# Patient Record
Sex: MALE | Race: WHITE | NOT HISPANIC OR LATINO | Employment: OTHER | URBAN - METROPOLITAN AREA
[De-identification: names, ages, dates, MRNs, and addresses within clinical notes are randomized per-mention and may not be internally consistent; named-entity substitution may affect disease eponyms.]

---

## 2017-01-28 ENCOUNTER — TELEPHONE (OUTPATIENT)
Dept: FAMILY MEDICINE | Facility: CLINIC | Age: 57
End: 2017-01-28

## 2017-01-28 DIAGNOSIS — F41.1 GAD (GENERALIZED ANXIETY DISORDER): Primary | ICD-10-CM

## 2017-01-30 NOTE — TELEPHONE ENCOUNTER
Prozac       Last Written Prescription Date: 08/02/2016  Last Fill Quantity: 90, # refills: 1  Last Office Visit with [facility] primary care provider:  01/06/2016        Last PHQ-9 score on record=   PHQ-9 SCORE 4/12/2013   Total Score 9

## 2017-02-01 NOTE — TELEPHONE ENCOUNTER
Medication is being filled for 1 time refill only due to:  Patient needs to be seen because it has been more than one year since last visit.   Eve Maxwell RN    Please call to schedule annual exam.

## 2017-02-01 NOTE — TELEPHONE ENCOUNTER
Left message to call TC line at [phone] to schedule Annual exam. Please help the patient schedule for Annual exam. Last seen 1/6/2016. Billie Lewis,

## 2017-02-03 ENCOUNTER — OFFICE VISIT (OUTPATIENT)
Dept: FAMILY MEDICINE | Facility: CLINIC | Age: 57
End: 2017-02-03
Payer: COMMERCIAL

## 2017-02-03 VITALS
WEIGHT: 248 LBS | SYSTOLIC BLOOD PRESSURE: 132 MMHG | TEMPERATURE: 98.9 F | DIASTOLIC BLOOD PRESSURE: 81 MMHG | HEART RATE: 100 BPM | HEIGHT: 75 IN | BODY MASS INDEX: 30.84 KG/M2 | OXYGEN SATURATION: 97 %

## 2017-02-03 DIAGNOSIS — R68.82 DECREASED LIBIDO: ICD-10-CM

## 2017-02-03 DIAGNOSIS — Z12.11 SCREEN FOR COLON CANCER: ICD-10-CM

## 2017-02-03 DIAGNOSIS — Z11.59 NEED FOR HEPATITIS C SCREENING TEST: ICD-10-CM

## 2017-02-03 DIAGNOSIS — I10 HYPERTENSION GOAL BP (BLOOD PRESSURE) < 140/90: Primary | ICD-10-CM

## 2017-02-03 DIAGNOSIS — E78.5 HYPERLIPIDEMIA LDL GOAL <130: ICD-10-CM

## 2017-02-03 DIAGNOSIS — Z23 NEED FOR PROPHYLACTIC VACCINATION AND INOCULATION AGAINST INFLUENZA: ICD-10-CM

## 2017-02-03 DIAGNOSIS — F41.1 GAD (GENERALIZED ANXIETY DISORDER): ICD-10-CM

## 2017-02-03 DIAGNOSIS — K21.9 GASTROESOPHAGEAL REFLUX DISEASE WITHOUT ESOPHAGITIS: ICD-10-CM

## 2017-02-03 LAB
ALBUMIN SERPL-MCNC: 4 G/DL (ref 3.4–5)
ALP SERPL-CCNC: 94 U/L (ref 40–150)
ALT SERPL W P-5'-P-CCNC: 50 U/L (ref 0–70)
ANION GAP SERPL CALCULATED.3IONS-SCNC: 10 MMOL/L (ref 3–14)
AST SERPL W P-5'-P-CCNC: 31 U/L (ref 0–45)
BILIRUB SERPL-MCNC: 0.5 MG/DL (ref 0.2–1.3)
BUN SERPL-MCNC: 12 MG/DL (ref 7–30)
CALCIUM SERPL-MCNC: 9.1 MG/DL (ref 8.5–10.1)
CHLORIDE SERPL-SCNC: 102 MMOL/L (ref 94–109)
CHOLEST SERPL-MCNC: 224 MG/DL
CO2 SERPL-SCNC: 28 MMOL/L (ref 20–32)
CREAT SERPL-MCNC: 0.66 MG/DL (ref 0.66–1.25)
GFR SERPL CREATININE-BSD FRML MDRD: ABNORMAL ML/MIN/1.7M2
GLUCOSE SERPL-MCNC: 106 MG/DL (ref 70–99)
HCV AB SERPL QL IA: NORMAL
HDLC SERPL-MCNC: 66 MG/DL
LDLC SERPL CALC-MCNC: 135 MG/DL
NONHDLC SERPL-MCNC: 158 MG/DL
POTASSIUM SERPL-SCNC: 4 MMOL/L (ref 3.4–5.3)
PROT SERPL-MCNC: 7.7 G/DL (ref 6.8–8.8)
SODIUM SERPL-SCNC: 140 MMOL/L (ref 133–144)
TRIGL SERPL-MCNC: 114 MG/DL
TSH SERPL DL<=0.005 MIU/L-ACNC: 3.17 MU/L (ref 0.4–4)

## 2017-02-03 PROCEDURE — 84403 ASSAY OF TOTAL TESTOSTERONE: CPT | Performed by: FAMILY MEDICINE

## 2017-02-03 PROCEDURE — 86803 HEPATITIS C AB TEST: CPT | Performed by: FAMILY MEDICINE

## 2017-02-03 PROCEDURE — 80053 COMPREHEN METABOLIC PANEL: CPT | Performed by: FAMILY MEDICINE

## 2017-02-03 PROCEDURE — 99214 OFFICE O/P EST MOD 30 MIN: CPT | Performed by: FAMILY MEDICINE

## 2017-02-03 PROCEDURE — 84443 ASSAY THYROID STIM HORMONE: CPT | Performed by: FAMILY MEDICINE

## 2017-02-03 PROCEDURE — 80061 LIPID PANEL: CPT | Performed by: FAMILY MEDICINE

## 2017-02-03 PROCEDURE — 36415 COLL VENOUS BLD VENIPUNCTURE: CPT | Performed by: FAMILY MEDICINE

## 2017-02-03 RX ORDER — MOMETASONE FUROATE 1 MG/G
CREAM TOPICAL
Qty: 45 G | Refills: 0 | Status: CANCELLED | OUTPATIENT
Start: 2017-02-03

## 2017-02-03 RX ORDER — NAPROXEN 500 MG/1
500 TABLET ORAL 2 TIMES DAILY PRN
Qty: 30 TABLET | Refills: 1 | Status: CANCELLED | OUTPATIENT
Start: 2017-02-03

## 2017-02-03 RX ORDER — FLUOXETINE 10 MG/1
10 CAPSULE ORAL DAILY
Qty: 30 CAPSULE | Refills: 1 | Status: SHIPPED | OUTPATIENT
Start: 2017-02-03 | End: 2017-03-28

## 2017-02-03 RX ORDER — LISINOPRIL AND HYDROCHLOROTHIAZIDE 12.5; 2 MG/1; MG/1
1 TABLET ORAL DAILY
Qty: 90 TABLET | Refills: 3 | Status: SHIPPED | OUTPATIENT
Start: 2017-02-03 | End: 2018-02-04

## 2017-02-03 RX ORDER — OMEPRAZOLE/SODIUM BICARBONATE 20MG-1.1G
20 CAPSULE ORAL DAILY
Qty: 90 CAPSULE | Refills: 3 | Status: CANCELLED | OUTPATIENT
Start: 2017-02-03

## 2017-02-03 RX ORDER — CYCLOBENZAPRINE HCL 10 MG
5-10 TABLET ORAL 3 TIMES DAILY PRN
Qty: 30 TABLET | Refills: 1 | Status: CANCELLED | OUTPATIENT
Start: 2017-02-03

## 2017-02-03 RX ORDER — ATORVASTATIN CALCIUM 20 MG/1
TABLET, FILM COATED ORAL
Qty: 90 TABLET | Refills: 3 | Status: SHIPPED | OUTPATIENT
Start: 2017-02-03 | End: 2018-02-07

## 2017-02-03 NOTE — MR AVS SNAPSHOT
After Visit Summary   2/3/2017    Kody Salas    MRN: [id number]           Patient Information     Date Of Birth          1960        Visit Information        Provider Department      2/3/2017 9:00 AM Kristen Uribe MD [facility]        Today's Diagnoses     Screen for colon cancer    -  1     Need for hepatitis C screening test         Need for prophylactic vaccination and inoculation against influenza         Eczema         Gastroesophageal reflux disease, esophagitis presence not specified         Hypertension goal BP (blood pressure) < 140/90         Hyperlipidemia LDL goal <130         GERD (gastroesophageal reflux disease)         GABE (generalized anxiety disorder)         Decreased libido           Care Instructions    [facility]    If you have any questions regarding to your visit please contact your care team:       Team Red:   Clinic Hours Telephone Number   Dr. Geena Wilburn, NP   7am-7pm  Monday - Thursday   7am-5pm  Fridays  [phone]  (Appointment scheduling available 24/7)    Questions about your visit?   Team Line  [phone]   Urgent Care - [place] - 11am-9pm Monday-Friday Saturday-Sunday- 9am-5pm   [place] - 5pm-9pm Monday-Friday Saturday-Sunday- 9am-5pm  [phone] - [place]   [phone] - [place]       What options do I have for visits at the clinic other than the traditional office visit?  To expand how we care for you, many of our providers are utilizing electronic visits (e-visits) and telephone visits, when medically appropriate, for interactions with their patients rather than a visit in the clinic.   We also offer nurse visits for many medical concerns. Just like any other service, we will bill your insurance company for this type of visit based on time spent on the phone with your provider. Not all insurance companies cover these visits.  Please check with your medical insurance if this type of visit is covered. You will be responsible for any charges that are not paid by your insurance.      E-visits via In [employer]:  generally incur a $35.00 fee.  Telephone visits:  Time spent on the phone: *charged based on time that is spent on the phone in increments of 10 minutes. Estimated cost:   5-10 mins $30.00   11-20 mins. $59.00   21-30 mins. $85.00     Use [employer] (secure email communication and access to your chart) to send your primary care provider a message or make an appointment. Ask someone on your Team how to sign up for [employer].  For a Price Quote for your services, please call our [employer] Line at [phone].      As always, Thank you for trusting us with your health care needs!  Darian Elliotttaelsy          Follow-ups after your visit        Additional Services     GASTROENTEROLOGY ADULT REF PROCEDURE ONLY       Last Lab Result: CREATININE (mg/dL)       Date                     Value                 01/06/2016               0.90             ----------  Body mass index is 31.19 kg/(m^2).     Needed:  No  Language:  English    Patient will be contacted to schedule procedure.     Please be aware that coverage of these services is subject to the terms and limitations of your health insurance plan.  Call member services at your health plan with any benefit or coverage questions.  Any procedures must be performed at a [place] facility OR coordinated by your clinic's referral office.    Please bring the following with you to your appointment:    (1) Any X-Rays, CTs or MRIs which have been performed.  Contact the facility where they were done to arrange for  prior to your scheduled appointment.    (2) List of current medications   (3) This referral request   (4) Any documents/labs given to you for this referral                  Future tests that were ordered for you today     Open Future Orders        Priority Expected Expires  "Ordered    Fecal colorectal cancer screen (FIT) Routine 2/24/2017 4/28/2017 2/3/2017            Who to contact     If you have questions or need follow up information about today's clinic visit or your schedule please contact [facility] directly at [phone].  Normal or non-critical lab and imaging results will be communicated to you by MyChart, letter or phone within 4 business days after the clinic has received the results. If you do not hear from us within 7 days, please contact the clinic through [employer] or phone. If you have a critical or abnormal lab result, we will notify you by phone as soon as possible.  Submit refill requests through [employer] or call your pharmacy and they will forward the refill request to us. Please allow 3 business days for your refill to be completed.          Additional Information About Your Visit        MyChart Information     [employer] gives you secure access to your electronic health record. If you see a primary care provider, you can also send messages to your care team and make appointments. If you have questions, please call your primary care clinic.  If you do not have a primary care provider, please call [phone] and they will assist you.        Care EveryWhere ID     This is your Care EveryWhere ID. This could be used by other organizations to access your [place] medical records  [id number]        Your Vitals Were     Pulse Temperature Height BMI (Body Mass Index) Pulse Oximetry       100 98.9  F (37.2  C) (Oral) 6' 2.75\" (1.899 m) 31.19 kg/m2 97%        Blood Pressure from Last 3 Encounters:   02/03/17 132/81   01/06/16 126/88   01/08/15 132/88    Weight from Last 3 Encounters:   02/03/17 248 lb (112.492 kg)   01/06/16 234 lb (106.142 kg)   01/08/15 242 lb 6.4 oz (109.952 kg)              We Performed the Following     Comprehensive metabolic panel     GASTROENTEROLOGY ADULT REF PROCEDURE ONLY     Hepatitis C Screen Reflex to HCV RNA Quant and Genotype  "    Lipid panel reflex to direct LDL     Testosterone, total     TSH with free T4 reflex          Today's Medication Changes          These changes are accurate as of: 2/3/17  9:22 AM.  If you have any questions, ask your nurse or doctor.               These medicines have changed or have updated prescriptions.        Dose/Directions    FLUoxetine 10 MG capsule   Commonly known as:  PROzac   This may have changed:  See the new instructions.   Used for:  GABE (generalized anxiety disorder)   Changed by:  Kristen Uribe MD        Dose:  10 mg   Take 1 capsule (10 mg) by mouth daily   Quantity:  30 capsule   Refills:  1       lisinopril-hydrochlorothiazide 20-12.5 MG per tablet   Commonly known as:  PRINZIDE/ZESTORETIC   This may have changed:    - how much to take  - how to take this  - when to take this  - additional instructions   Used for:  Hypertension goal BP (blood pressure) < 140/90   Changed by:  Kristen Uribe MD        Dose:  1 tablet   Take 1 tablet by mouth daily TAKE 1 TABLET BY MOUTH DAILY **   Quantity:  90 tablet   Refills:  3            Where to get your medicines      These medications were sent to [facility]pharm[facility][id number] - [place] - [address] [facility]  [address] [place] [id number]     Phone:  [phone]    - atorvastatin 20 MG tablet  - FLUoxetine 10 MG capsule  - lisinopril-hydrochlorothiazide 20-12.5 MG per tablet             Primary Care Provider Office Phone # Fax #    Kristen Uribe [phone]       [address] [address]        Thank you!     Thank you for choosing [facility]  for your care. Our goal is always to provide you with excellent care. Hearing back from our patients is one way we can continue to improve our services. Please take a few minutes to complete the written survey that you may receive in the mail after your visit with us. Thank you!             Your Updated Medication List - Protect others around  you: Learn how to safely use, store and throw away your medicines at www.disposemymeds.org.          This list is accurate as of: 2/3/17  9:22 AM.  Always use your most recent med list.                   Brand Name Dispense Instructions for use    aspirin 81 MG tablet      Take 1 tablet by mouth daily.       atorvastatin 20 MG tablet    LIPITOR    90 tablet    TAKE 1 TABLET (20 MG) BY MOUTH DAILY       CALCIUM + D PO      Take 1 tablet by mouth daily.       FLUoxetine 10 MG capsule    PROzac    30 capsule    Take 1 capsule (10 mg) by mouth daily       lisinopril-hydrochlorothiazide 20-12.5 MG per tablet    PRINZIDE/ZESTORETIC    90 tablet    Take 1 tablet by mouth daily TAKE 1 TABLET BY MOUTH DAILY **       mometasone 0.1 % cream    ELOCON    45 g    APPLY TOPICALLY DAILY       omeprazole-sodium bicarbonate  MG Caps per capsule    ZEGERID OTC    90 capsule    Take 1 capsule by mouth daily.       ranitidine 150 MG tablet    ZANTAC    60 tablet    TAKE 1 TABLET (150 MG) BY MOUTH 2 TIMES DAILY

## 2017-02-03 NOTE — NURSING NOTE
"Chief Complaint   Patient presents with     Recheck Medication     Hypertension     Lipids       Initial /81 mmHg  Pulse 100  Temp(Src) 98.9  F (37.2  C) (Oral)  Ht 6' 2.75\" (1.899 m)  Wt 248 lb (112.492 kg)  BMI 31.19 kg/m2  SpO2 97% Estimated body mass index is 31.19 kg/(m^2) as calculated from the following:    Height as of this encounter: 6' 2.75\" (1.899 m).    Weight as of this encounter: 248 lb (112.492 kg).  BP completed using cuff size: jena Rasmussen    "

## 2017-02-03 NOTE — PATIENT INSTRUCTIONS
[facility]    If you have any questions regarding to your visit please contact your care team:       Team Red:   Clinic Hours Telephone Number   Dr. Geena Wilburn, NP   7am-7pm  Monday - Thursday   7am-5pm  Fridays  [phone]  (Appointment scheduling available 24/7)    Questions about your visit?   Team Line  [phone]   Urgent Care - [place] - 11am-9pm Monday-Friday Saturday-Sunday- 9am-5pm   [place] - 5pm-9pm Monday-Friday Saturday-Sunday- 9am-5pm  [phone] - [place]   [phone] - [place]       What options do I have for visits at the clinic other than the traditional office visit?  To expand how we care for you, many of our providers are utilizing electronic visits (e-visits) and telephone visits, when medically appropriate, for interactions with their patients rather than a visit in the clinic.   We also offer nurse visits for many medical concerns. Just like any other service, we will bill your insurance company for this type of visit based on time spent on the phone with your provider. Not all insurance companies cover these visits. Please check with your medical insurance if this type of visit is covered. You will be responsible for any charges that are not paid by your insurance.      E-visits via [employer]:  generally incur a $35.00 fee.  Telephone visits:  Time spent on the phone: *charged based on time that is spent on the phone in increments of 10 minutes. Estimated cost:   5-10 mins $30.00   11-20 mins. $59.00   21-30 mins. $85.00     Use [employer] (secure email communication and access to your chart) to send your primary care provider a message or make an appointment. Ask someone on your Team how to sign up for [employer].  For a Price Quote for your services, please call our Consumer Price Line at [phone].      As always, Thank you for trusting us with your health care needs!  Darian STANLEY  [place]

## 2017-02-03 NOTE — PROGRESS NOTES
SUBJECTIVE:                                                    Kody Salas is a 56 year old male who presents to clinic today for the following health issues:      Hyperlipidemia Follow-Up      Rate your low fat/cholesterol diet?: not monitoring fat    Taking statin?  Yes, no muscle aches from statin    Other lipid medications/supplements?:  none     Hypertension Follow-up      Outpatient blood pressures are not being checked.    Low Salt Diet: not monitoring salt       Amount of exercise or physical activity: None    Problems taking medications regularly: No    Medication side effects: none    Diet:  low salt and low fat/cholesterol        Problem list and histories reviewed & adjusted, as indicated.  Additional history: as documented    Patient Active Problem List   Diagnosis     Hyperlipidemia LDL goal <130     Seasonal allergies     Hypertension goal BP (blood pressure) < 140/90     GABE (generalized anxiety disorder)     GERD (gastroesophageal reflux disease)     Eczema     Obesity     History of ETOH abuse     Chronic back pain     Impaired glucose tolerance     Anxiety     Past Surgical History   Procedure Laterality Date     Prophylaxis retina detach,cryo/diath  12/2009     Hc knee scope,med/lat meniscus repair  2003     right       Social History   Substance Use Topics     Smoking status: Former Smoker     Quit date: 06/15/2012     Smokeless tobacco: Never Used     Alcohol Use: No      Comment: quit January 2015     Family History   Problem Relation Age of Onset     Hypertension Mother      Hypertension Father      Hypertension Brother      Hypertension Maternal Grandmother      Hypertension Maternal Grandfather      Hypertension Paternal Grandmother      Hypertension Paternal Grandfather      HEART DISEASE Paternal Grandfather      Anesthesia Reaction No family hx of          Current Outpatient Prescriptions   Medication Sig Dispense Refill     lisinopril-hydrochlorothiazide  (PRINZIDE/ZESTORETIC) 20-12.5 MG per tablet Take 1 tablet by mouth daily TAKE 1 TABLET BY MOUTH DAILY ** 90 tablet 3     atorvastatin (LIPITOR) 20 MG tablet TAKE 1 TABLET (20 MG) BY MOUTH DAILY 90 tablet 3     FLUoxetine (PROZAC) 10 MG capsule Take 1 capsule (10 mg) by mouth daily 30 capsule 1     mometasone (ELOCON) 0.1 % cream APPLY TOPICALLY DAILY 45 g 0     ranitidine (ZANTAC) 150 MG tablet TAKE 1 TABLET (150 MG) BY MOUTH 2 TIMES DAILY 60 tablet 2     aspirin 81 MG tablet Take 1 tablet by mouth daily.       Calcium Carbonate-Vitamin D (CALCIUM + D PO) Take 1 tablet by mouth daily.       Omeprazole-Sodium Bicarbonate (ZEGERID OTC)  MG CAPS Take 1 capsule by mouth daily. 90 capsule 3     [DISCONTINUED] FLUoxetine (PROZAC) 20 MG capsule TAKE 1 CAPSULE EVERY DAY 90 capsule 0     [DISCONTINUED] lisinopril-hydrochlorothiazide (PRINZIDE,ZESTORETIC) 20-12.5 MG per tablet TAKE 1 TABLET BY MOUTH DAILY **NEEDS MD FOLLOW UP** 90 tablet 3     [DISCONTINUED] atorvastatin (LIPITOR) 20 MG tablet TAKE 1 TABLET (20 MG) BY MOUTH DAILY 90 tablet 3     No Known Allergies  Recent Labs   Lab Test  01/06/16   0837  01/08/15   0756  09/25/14   1213   11/27/13   0918   LDL  126*  102  136*   --   116   HDL  37*   --   71   --   57   TRIG  130   --   119   --   79   ALT  26   --   54   --   53   CR  0.90   --   0.77   < >  0.64*   GFRESTIMATED  87   --   >90  Non  GFR Calc     < >  >90   GFRESTBLACK  >90   GFR Calc     --   >90   GFR Calc     < >  >90   POTASSIUM  4.7  4.1  4.0   < >  4.5    < > = values in this interval not displayed.      BP Readings from Last 3 Encounters:   02/03/17 132/81   01/06/16 126/88   01/08/15 132/88    Wt Readings from Last 3 Encounters:   02/03/17 248 lb (112.492 kg)   01/06/16 234 lb (106.142 kg)   01/08/15 242 lb 6.4 oz (109.952 kg)             Pt says he has Decreased Libido and wants testosterone check  He is doing very well as far as anxiety and  "wants to get off Prozac  He occasionally drinks wine bur no Hard Liquor and is doing well     Labs reviewed in EPIC  Problem list, Medication list, Allergies, and Medical/Social/Surgical histories reviewed in [facility] and updated as appropriate.    ROS:  C: NEGATIVE for fever, chills, change in weight  E/M: NEGATIVE for ear, mouth and throat problems  R: NEGATIVE for significant cough or SOB  CV: NEGATIVE for chest pain, palpitations or peripheral edema  GI: NEGATIVE for nausea, abdominal pain, heartburn, or change in bowel habits  PSYCHIATRIC: NEGATIVE for changes in mood or affect    OBJECTIVE:                                                    /81 mmHg  Pulse 100  Temp(Src) 98.9  F (37.2  C) (Oral)  Ht 6' 2.75\" (1.899 m)  Wt 248 lb (112.492 kg)  BMI 31.19 kg/m2  SpO2 97%  Body mass index is 31.19 kg/(m^2).  GENERAL: healthy, alert and no distress  NECK: no adenopathy, no asymmetry, masses, or scars and thyroid normal to palpation  RESP: lungs clear to auscultation - no rales, rhonchi or wheezes  CV: regular rate and rhythm, normal S1 S2, no S3 or S4, no murmur, click or rub, no peripheral edema and peripheral pulses strong  ABDOMEN: soft, nontender, no hepatosplenomegaly, no masses and bowel sounds normal  MS: no gross musculoskeletal defects noted, no edema  Psych normal   Diagnostic Test Results:  Pending      ASSESSMENT/PLAN:                                                        BMI:   Estimated body mass index is 31.19 kg/(m^2) as calculated from the following:    Height as of this encounter: 6' 2.75\" (1.899 m).    Weight as of this encounter: 248 lb (112.492 kg).   Weight management plan: low ross diet.exercise      1. Hypertension goal BP (blood pressure) < 140/90  controlled  - lisinopril-hydrochlorothiazide (PRINZIDE/ZESTORETIC) 20-12.5 MG per tablet; Take 1 tablet by mouth daily TAKE 1 TABLET BY MOUTH DAILY **  Dispense: 90 tablet; Refill: 3    2. Hyperlipidemia LDL goal <130  Will check  - " atorvastatin (LIPITOR) 20 MG tablet; TAKE 1 TABLET (20 MG) BY MOUTH DAILY  Dispense: 90 tablet; Refill: 3  - Lipid panel reflex to direct LDL  - Comprehensive metabolic panel    3. GERD (gastroesophageal reflux disease)  Pt takes otc PPI as needed    4. GABE (generalized anxiety disorder)  Advised decrease Prozac to 10 mg daily and gradually wean over the next 3 weeks  Call if any Problems or recurrence of symptoms  - FLUoxetine (PROZAC) 10 MG capsule; Take 1 capsule (10 mg) by mouth daily  Dispense: 30 capsule; Refill: 1    5. Decreased libido  Discussed Treatment options  See how he does when off Prozac  - Testosterone, total  - TSH with free T4 reflex    6. Screen for colon cancer  Advised colonoscopy-Pt says he will get it possibly in summer  Advised do FIT card till then  - Fecal colorectal cancer screen (FIT); Future  - GASTROENTEROLOGY ADULT REF PROCEDURE ONLY    7. Need for hepatitis C screening test  Advised   - Hepatitis C Screen Reflex to HCV RNA Quant and Genotype    8. Need for prophylactic vaccination and inoculation against influenza  Pt declined      Work on weight loss  Regular exercise    Kristen Uribe MD  [facility]

## 2017-02-04 LAB — TESTOST SERPL-MCNC: 251 NG/DL (ref 240–950)

## 2017-02-19 DIAGNOSIS — E78.5 HYPERLIPIDEMIA LDL GOAL <130: ICD-10-CM

## 2017-02-21 RX ORDER — ATORVASTATIN CALCIUM 20 MG/1
TABLET, FILM COATED ORAL
Qty: 90 TABLET | Refills: 3 | OUTPATIENT
Start: 2017-02-21

## 2017-02-21 NOTE — TELEPHONE ENCOUNTER
Duplicate.  Called and verified with pharmacy.    Medication Detail         Disp Refills Start End CATARINA     atorvastatin (LIPITOR) 20 MG tablet 90 tablet 3 2/3/2017  No     Sig: TAKE 1 TABLET (20 MG) BY MOUTH DAILY     Class: E-Prescribe     Order: [id number]     E-Prescribing Status: Receipt confirmed by pharmacy (2/3/2017  9:10 AM CST)       Printout Tracking      External Result Report       Medication Administration Instructions      TAKE 1 TABLET (20 MG) BY MOUTH DAILY     Meera Jordan MA

## 2017-03-26 ENCOUNTER — MYC MEDICAL ADVICE (OUTPATIENT)
Dept: FAMILY MEDICINE | Facility: CLINIC | Age: 57
End: 2017-03-26

## 2017-03-26 DIAGNOSIS — F41.1 GAD (GENERALIZED ANXIETY DISORDER): ICD-10-CM

## 2017-03-27 RX ORDER — FLUOXETINE 10 MG/1
10 CAPSULE ORAL DAILY
Qty: 30 CAPSULE | Refills: 1 | Status: CANCELLED | OUTPATIENT
Start: 2017-03-27

## 2017-03-27 RX ORDER — SILDENAFIL 50 MG/1
50 TABLET, FILM COATED ORAL DAILY PRN
Qty: 30 TABLET | Status: CANCELLED | OUTPATIENT
Start: 2017-03-27

## 2017-03-27 NOTE — TELEPHONE ENCOUNTER
Medications and pharmacy teed up.  Viagra is not active under patient's current meds list.  Please review and advise.    Rima ARANA RN, BSN

## 2017-03-28 RX ORDER — FLUOXETINE 10 MG/1
10 CAPSULE ORAL DAILY
Qty: 30 CAPSULE | Refills: 6 | Status: SHIPPED | OUTPATIENT
Start: 2017-03-28 | End: 2017-04-20

## 2017-03-28 ASSESSMENT — ANXIETY QUESTIONNAIRES
GAD7 TOTAL SCORE: 0
7. FEELING AFRAID AS IF SOMETHING AWFUL MIGHT HAPPEN: 0 = NOT AT ALL
GAD7 TOTAL SCORE: 0

## 2017-03-28 NOTE — TELEPHONE ENCOUNTER
Please do GABE  I have Not ? Prescribed Viagra  Please set up e visit or telephone visit to discuss

## 2017-03-28 NOTE — TELEPHONE ENCOUNTER
Sent a [employer] message with the providers message below.     MA please follow up on this to do the GABE over the phone and inform them to set up a Evisit. Thank you

## 2017-03-29 ASSESSMENT — ANXIETY QUESTIONNAIRES: GAD7 TOTAL SCORE: 0

## 2017-04-06 NOTE — TELEPHONE ENCOUNTER
Patient has been called and read his [employer] message and still has not made an appointment. Closing chart.  María Leyva,

## 2017-04-20 DIAGNOSIS — F41.1 GAD (GENERALIZED ANXIETY DISORDER): ICD-10-CM

## 2017-04-21 ENCOUNTER — TELEPHONE (OUTPATIENT)
Dept: FAMILY MEDICINE | Facility: CLINIC | Age: 57
End: 2017-04-21

## 2017-04-21 DIAGNOSIS — Z12.11 SCREEN FOR COLON CANCER: Primary | ICD-10-CM

## 2017-04-21 RX ORDER — FLUOXETINE 10 MG/1
10 CAPSULE ORAL DAILY
Qty: 30 CAPSULE | Refills: 6 | Status: SHIPPED | OUTPATIENT
Start: 2017-04-21 | End: 2017-12-22

## 2017-04-21 NOTE — TELEPHONE ENCOUNTER
Panel Management Review      Patient has the following on his problem list:     Hypertension   Last three blood pressure readings:  BP Readings from Last 3 Encounters:   02/03/17 132/81   01/06/16 126/88   01/08/15 132/88         HTN Guidelines:  Age 18-59 BP range:  Less than 140/90  Age 60-85 with Diabetes:  Less than 140/90  Age 60-85 without Diabetes:  less than 150/90      Composite cancer screening  Chart review shows that this patient is due/due soon for the following Colonoscopy and Fecal Colorectal (FIT)  Summary:    Patient is due/failing the following:   COLONOSCOPY and FIT    Action needed:   Routed to provider for review.    Type of outreach:    None, routed to provider for review.    Questions for provider review:    None                                                                                                                                    Lindy Trimble MA       Chart routed to Provider .

## 2017-05-01 DIAGNOSIS — L30.9 ECZEMA: ICD-10-CM

## 2017-05-02 NOTE — TELEPHONE ENCOUNTER
mometasone (ELOCON) 0.1 % cream      Last Written Prescription Date: 11/22/2016  Last Fill Quantity: 45g,  # refills: 0   Last Office Visit with [facility], [facility] or [facility] prescribing provider: 02/03/2017                                             Meera Jordan MA

## 2017-05-03 RX ORDER — MOMETASONE FUROATE 1 MG/G
CREAM TOPICAL
Qty: 45 G | Refills: 1 | Status: SHIPPED | OUTPATIENT
Start: 2017-05-03 | End: 2018-03-29

## 2017-12-12 ENCOUNTER — MYC MEDICAL ADVICE (OUTPATIENT)
Dept: FAMILY MEDICINE | Facility: CLINIC | Age: 57
End: 2017-12-12

## 2017-12-12 DIAGNOSIS — L98.9 SKIN LESION: Primary | ICD-10-CM

## 2017-12-12 NOTE — TELEPHONE ENCOUNTER
"Patient is asking for dermatologist referral regarding some \"skin cancer concerns\".   Please advise for referral or RN triage    Kena Huber RN       "

## 2017-12-15 NOTE — TELEPHONE ENCOUNTER
Unable to leave a voicemail as it has not been set up yet. Billie Lewis,       Left a message on work phone to check his [employer] message for response to his request.

## 2017-12-21 ENCOUNTER — TELEPHONE (OUTPATIENT)
Dept: FAMILY MEDICINE | Facility: CLINIC | Age: 57
End: 2017-12-21

## 2017-12-21 DIAGNOSIS — Z12.11 SPECIAL SCREENING FOR MALIGNANT NEOPLASMS, COLON: Primary | ICD-10-CM

## 2017-12-21 DIAGNOSIS — F41.1 GAD (GENERALIZED ANXIETY DISORDER): ICD-10-CM

## 2017-12-22 RX ORDER — FLUOXETINE 10 MG/1
10 CAPSULE ORAL DAILY
Qty: 90 CAPSULE | Refills: 0 | Status: SHIPPED | OUTPATIENT
Start: 2017-12-22 | End: 2018-02-07

## 2017-12-22 RX ORDER — FLUOXETINE 10 MG/1
CAPSULE ORAL
Qty: 30 CAPSULE | Refills: 1 | Status: SHIPPED | OUTPATIENT
Start: 2017-12-22 | End: 2018-02-07

## 2017-12-22 NOTE — TELEPHONE ENCOUNTER
Prescription approved per [facility] Refill Protocol.  Flakita Pinto, RN - BC

## 2017-12-26 NOTE — TELEPHONE ENCOUNTER
Called patient, voicemail not set up yet.  Unable to leave voicemail, will try again at another time.  Lindy Trimble MA

## 2018-01-04 NOTE — TELEPHONE ENCOUNTER
Attempted to reach patient no answer, voicemail has not been set up unable to leave message. Catherine Sanchez MA

## 2018-01-12 NOTE — TELEPHONE ENCOUNTER
We have not received a call back from patient at this time. Closing this encounter.     Holly Fam MA

## 2018-02-07 ENCOUNTER — OFFICE VISIT (OUTPATIENT)
Dept: FAMILY MEDICINE | Facility: CLINIC | Age: 58
End: 2018-02-07
Payer: COMMERCIAL

## 2018-02-07 VITALS
WEIGHT: 241 LBS | DIASTOLIC BLOOD PRESSURE: 72 MMHG | BODY MASS INDEX: 29.97 KG/M2 | HEIGHT: 75 IN | TEMPERATURE: 99.2 F | RESPIRATION RATE: 20 BRPM | HEART RATE: 92 BPM | SYSTOLIC BLOOD PRESSURE: 106 MMHG | OXYGEN SATURATION: 96 %

## 2018-02-07 DIAGNOSIS — Z23 NEED FOR PROPHYLACTIC VACCINATION AND INOCULATION AGAINST INFLUENZA: ICD-10-CM

## 2018-02-07 DIAGNOSIS — F41.1 GAD (GENERALIZED ANXIETY DISORDER): ICD-10-CM

## 2018-02-07 DIAGNOSIS — R35.0 BENIGN PROSTATIC HYPERPLASIA WITH URINARY FREQUENCY: ICD-10-CM

## 2018-02-07 DIAGNOSIS — K21.9 GASTROESOPHAGEAL REFLUX DISEASE WITHOUT ESOPHAGITIS: ICD-10-CM

## 2018-02-07 DIAGNOSIS — E78.5 HYPERLIPIDEMIA LDL GOAL <130: Primary | ICD-10-CM

## 2018-02-07 DIAGNOSIS — Z87.891 HISTORY OF TOBACCO USE: ICD-10-CM

## 2018-02-07 DIAGNOSIS — R73.02 IMPAIRED GLUCOSE TOLERANCE: ICD-10-CM

## 2018-02-07 DIAGNOSIS — I10 HYPERTENSION GOAL BP (BLOOD PRESSURE) < 140/90: ICD-10-CM

## 2018-02-07 DIAGNOSIS — Z12.5 SCREENING FOR PROSTATE CANCER: ICD-10-CM

## 2018-02-07 DIAGNOSIS — Z12.11 SCREEN FOR COLON CANCER: ICD-10-CM

## 2018-02-07 DIAGNOSIS — F10.11 HISTORY OF ETOH ABUSE: ICD-10-CM

## 2018-02-07 DIAGNOSIS — Z00.00 ROUTINE HISTORY AND PHYSICAL EXAMINATION OF ADULT: ICD-10-CM

## 2018-02-07 DIAGNOSIS — N40.1 BENIGN PROSTATIC HYPERPLASIA WITH URINARY FREQUENCY: ICD-10-CM

## 2018-02-07 LAB
ALBUMIN SERPL-MCNC: 4 G/DL (ref 3.4–5)
ALP SERPL-CCNC: 74 U/L (ref 40–150)
ALT SERPL W P-5'-P-CCNC: 38 U/L (ref 0–70)
ANION GAP SERPL CALCULATED.3IONS-SCNC: 7 MMOL/L (ref 3–14)
AST SERPL W P-5'-P-CCNC: 18 U/L (ref 0–45)
BILIRUB DIRECT SERPL-MCNC: 0.1 MG/DL (ref 0–0.2)
BILIRUB SERPL-MCNC: 0.6 MG/DL (ref 0.2–1.3)
BUN SERPL-MCNC: 15 MG/DL (ref 7–30)
CALCIUM SERPL-MCNC: 9.3 MG/DL (ref 8.5–10.1)
CHLORIDE SERPL-SCNC: 106 MMOL/L (ref 94–109)
CHOLEST SERPL-MCNC: 186 MG/DL
CO2 SERPL-SCNC: 28 MMOL/L (ref 20–32)
CREAT SERPL-MCNC: 0.74 MG/DL (ref 0.66–1.25)
GFR SERPL CREATININE-BSD FRML MDRD: >90 ML/MIN/1.7M2
GLUCOSE SERPL-MCNC: 102 MG/DL (ref 70–99)
HBA1C MFR BLD: 5.5 % (ref 4.3–6)
HDLC SERPL-MCNC: 41 MG/DL
LDLC SERPL CALC-MCNC: 126 MG/DL
NONHDLC SERPL-MCNC: 145 MG/DL
POTASSIUM SERPL-SCNC: 3.7 MMOL/L (ref 3.4–5.3)
PROT SERPL-MCNC: 7.5 G/DL (ref 6.8–8.8)
PSA SERPL-ACNC: 0.61 UG/L (ref 0–4)
SODIUM SERPL-SCNC: 141 MMOL/L (ref 133–144)
TRIGL SERPL-MCNC: 95 MG/DL

## 2018-02-07 PROCEDURE — 36415 COLL VENOUS BLD VENIPUNCTURE: CPT | Performed by: FAMILY MEDICINE

## 2018-02-07 PROCEDURE — 99213 OFFICE O/P EST LOW 20 MIN: CPT | Mod: 25 | Performed by: FAMILY MEDICINE

## 2018-02-07 PROCEDURE — 99396 PREV VISIT EST AGE 40-64: CPT | Performed by: FAMILY MEDICINE

## 2018-02-07 PROCEDURE — 80061 LIPID PANEL: CPT | Performed by: FAMILY MEDICINE

## 2018-02-07 PROCEDURE — G0103 PSA SCREENING: HCPCS | Performed by: FAMILY MEDICINE

## 2018-02-07 PROCEDURE — G0296 VISIT TO DETERM LDCT ELIG: HCPCS | Performed by: FAMILY MEDICINE

## 2018-02-07 PROCEDURE — 83036 HEMOGLOBIN GLYCOSYLATED A1C: CPT | Performed by: FAMILY MEDICINE

## 2018-02-07 PROCEDURE — 80076 HEPATIC FUNCTION PANEL: CPT | Performed by: FAMILY MEDICINE

## 2018-02-07 PROCEDURE — 80048 BASIC METABOLIC PNL TOTAL CA: CPT | Performed by: FAMILY MEDICINE

## 2018-02-07 RX ORDER — LISINOPRIL AND HYDROCHLOROTHIAZIDE 12.5; 2 MG/1; MG/1
1 TABLET ORAL DAILY
Qty: 90 TABLET | Refills: 3 | Status: SHIPPED | OUTPATIENT
Start: 2018-02-07 | End: 2019-04-03

## 2018-02-07 RX ORDER — TAMSULOSIN HYDROCHLORIDE 0.4 MG/1
0.4 CAPSULE ORAL DAILY
Qty: 30 CAPSULE | Refills: 1 | Status: SHIPPED | OUTPATIENT
Start: 2018-02-07 | End: 2018-04-08

## 2018-02-07 RX ORDER — ATORVASTATIN CALCIUM 20 MG/1
TABLET, FILM COATED ORAL
Qty: 90 TABLET | Refills: 3 | Status: SHIPPED | OUTPATIENT
Start: 2018-02-07 | End: 2019-02-14

## 2018-02-07 RX ORDER — FLUOXETINE 10 MG/1
CAPSULE ORAL
Qty: 30 CAPSULE | Refills: 6 | Status: SHIPPED | OUTPATIENT
Start: 2018-02-07 | End: 2018-10-07

## 2018-02-07 ASSESSMENT — ANXIETY QUESTIONNAIRES
6. BECOMING EASILY ANNOYED OR IRRITABLE: SEVERAL DAYS
GAD7 TOTAL SCORE: 5
7. FEELING AFRAID AS IF SOMETHING AWFUL MIGHT HAPPEN: SEVERAL DAYS
5. BEING SO RESTLESS THAT IT IS HARD TO SIT STILL: NOT AT ALL
1. FEELING NERVOUS, ANXIOUS, OR ON EDGE: NOT AT ALL
3. WORRYING TOO MUCH ABOUT DIFFERENT THINGS: SEVERAL DAYS
IF YOU CHECKED OFF ANY PROBLEMS ON THIS QUESTIONNAIRE, HOW DIFFICULT HAVE THESE PROBLEMS MADE IT FOR YOU TO DO YOUR WORK, TAKE CARE OF THINGS AT HOME, OR GET ALONG WITH OTHER PEOPLE: SOMEWHAT DIFFICULT
2. NOT BEING ABLE TO STOP OR CONTROL WORRYING: SEVERAL DAYS

## 2018-02-07 ASSESSMENT — PATIENT HEALTH QUESTIONNAIRE - PHQ9: 5. POOR APPETITE OR OVEREATING: SEVERAL DAYS

## 2018-02-07 ASSESSMENT — PAIN SCALES - GENERAL: PAINLEVEL: NO PAIN (0)

## 2018-02-07 NOTE — PROGRESS NOTES
Lung Cancer Screening Shared Decision Making Visit     Kody Salas is eligible for lung cancer screening on the basis of the information provided in my signed lung cancer screening order.     I have discussed with patient the risks and benefits of screening for lung cancer with low-dose CT.     The risks include:   radiation exposure    false positives     over-diagnosis    The benefit of early detection of lung cancer is contingent upon adherence to annual screening or more frequent follow up if indicated.     Furthermore, reaping the benefits of screening requires Kody Salas to be willing and physically able to undergo diagnostic procedures, if indicated. Although no specific guide is available for determining severity of comorbidities, it is reasonable to withhold screening in patients who have greater mortality risk from other diseases.     We did discuss that the only way to prevent lung cancer is to not smoke.   ShouldIScreen      Answers for HPI/ROS submitted by the patient on 2/7/2018   Annual Exam:  Getting at least 3 servings of Calcium per day:: NO  Bi-annual eye exam:: Yes  Dental care twice a year:: NO  Sleep apnea or symptoms of sleep apnea:: Daytime drowsiness  Diet:: Regular (no restrictions)  Frequency of exercise:: None  Taking medications regularly:: Yes  Medication side effects:: None  Additional concerns today:: YES  PHQ-2 Score: 1

## 2018-02-07 NOTE — PROGRESS NOTES
SUBJECTIVE:   CC: Kody Salas is an 57 year old male who presents for preventative health visit.     Physical   Annual:     Getting at least 3 servings of Calcium per day::  NO    Bi-annual eye exam::  Yes    Dental care twice a year::  NO    Sleep apnea or symptoms of sleep apnea::  Daytime drowsiness    Diet::  Regular (no restrictions)    Frequency of exercise::  None    Taking medications regularly::  Yes    Medication side effects::  None    Additional concerns today::  YES                Hyperlipidemia Follow-Up      Rate your low fat/cholesterol diet?: good    Taking statin?  Yes, no muscle aches from statin    Other lipid medications/supplements?:  none    Hypertension Follow-up      Outpatient blood pressures are not being checked.    Low Salt Diet: no added salt      Today's PHQ-2 Score:   PHQ-2 ( 1999 [employer]) 2/7/2018   Q1: Little interest or pleasure in doing things 1   Q2: Feeling down, depressed or hopeless 0   PHQ-2 Score 1   Q1: Little interest or pleasure in doing things Several days   Q2: Feeling down, depressed or hopeless Not at all   PHQ-2 Score 1       Abuse: Current or Past(Physical, Sexual or Emotional)- No  Do you feel safe in your environment - Yes    Social History   Substance Use Topics     Smoking status: Former Smoker     Quit date: 6/15/2012     Smokeless tobacco: Never Used     Alcohol use No      Comment: quit January 2015     Alcohol Use 2/7/2018   If you drink alcohol, do you typically have greater than 3 drinks per day OR greater than 7 drinks per week?   Not applicable       Last PSA:   PSA   Date Value Ref Range Status   09/25/2014 0.98 0 - 4 ug/L Final       Reviewed orders with patient. Reviewed health maintenance and updated orders accordingly - Yes  Labs reviewed in EPIC  BP Readings from Last 3 Encounters:   02/07/18 106/72   02/03/17 132/81   01/06/16 126/88    Wt Readings from Last 3 Encounters:   02/07/18 241 lb (109.3 kg)   02/03/17 248 lb (112.5 kg)    01/06/16 234 lb (106.1 kg)                  Patient Active Problem List   Diagnosis     Hyperlipidemia LDL goal <130     Seasonal allergies     Hypertension goal BP (blood pressure) < 140/90     GABE (generalized anxiety disorder)     GERD (gastroesophageal reflux disease)     Eczema     Obesity     History of ETOH abuse     Chronic back pain     Impaired glucose tolerance     Anxiety     Past Surgical History:   Procedure Laterality Date     HC KNEE SCOPE,MED/LAT MENISCUS REPAIR  2003    right     PROPHYLAXIS RETINA DETACH,CRYO/DIATH  12/2009       Social History   Substance Use Topics     Smoking status: Former Smoker     Packs/day: 1.00     Years: 35.00     Quit date: 6/15/2012     Smokeless tobacco: Never Used     Alcohol use No      Comment: quit January 2015     Family History   Problem Relation Age of Onset     Hypertension Mother      Hypertension Father      Hypertension Brother      Hypertension Maternal Grandmother      Hypertension Maternal Grandfather      Hypertension Paternal Grandmother      Hypertension Paternal Grandfather      HEART DISEASE Paternal Grandfather      Anesthesia Reaction No family hx of          Current Outpatient Prescriptions   Medication Sig Dispense Refill     tamsulosin (FLOMAX) 0.4 MG capsule Take 1 capsule (0.4 mg) by mouth daily 30 capsule 1     lisinopril-hydrochlorothiazide (PRINZIDE/ZESTORETIC) 20-12.5 MG per tablet Take 1 tablet by mouth daily due for office visit for further refills. 90 tablet 3     atorvastatin (LIPITOR) 20 MG tablet TAKE 1 TABLET (20 MG) BY MOUTH DAILY 90 tablet 3     FLUoxetine (PROZAC) 10 MG capsule TAKE 1 CAPSULE (10 MG) BY MOUTH DAILY 30 capsule 6     order for DME Light for SAD. 10,000 lux, to use daily for 15 minutes per day starting in October, increase to 30 minutes per day starting in November, December and January, decrease to 15 minutes daily in February through March. 1 each 0     mometasone (ELOCON) 0.1 % cream APPLY TO AFFECTED AREA  DAILY AS DIRECTED 45 g 1     [DISCONTINUED] lisinopril-hydrochlorothiazide (PRINZIDE/ZESTORETIC) 20-12.5 MG per tablet Take 1 tablet by mouth daily due for office visit for further refills. 30 tablet 0     [DISCONTINUED] FLUoxetine (PROZAC) 10 MG capsule TAKE 1 CAPSULE (10 MG) BY MOUTH DAILY 30 capsule 1     [DISCONTINUED] FLUoxetine (PROZAC) 10 MG capsule Take 1 capsule (10 mg) by mouth daily 90 capsule 0     [DISCONTINUED] atorvastatin (LIPITOR) 20 MG tablet TAKE 1 TABLET (20 MG) BY MOUTH DAILY 90 tablet 3     No Known Allergies  Recent Labs   Lab Test  02/07/18   0947  02/03/17   0928  01/06/16   0837  01/08/15   0756  09/25/14   1213   A1C  5.5   --    --    --    --    LDL   --   135*  126*  102  136*   HDL   --   66  37*   --   71   TRIG   --   114  130   --   119   ALT   --   50  26   --   54   CR   --   0.66  0.90   --   0.77   GFRESTIMATED   --   >90  Non  GFR Calc    87   --   >90  Non  GFR Calc     GFRESTBLACK   --   >90   GFR Calc    >90   GFR Calc     --   >90   GFR Calc     POTASSIUM   --   4.0  4.7  4.1  4.0   TSH   --   3.17   --    --    --         Reviewed and updated as needed this visit by clinical staff  Tobacco  Allergies  Meds  Med Hx  Surg Hx  Fam Hx  Soc Hx        Reviewed and updated as needed this visit by Provider        Past Medical History:   Diagnosis Date     Arthritis      Hypertension       Past Surgical History:   Procedure Laterality Date      KNEE SCOPE,MED/LAT MENISCUS REPAIR  2003    right     PROPHYLAXIS RETINA DETACH,CRYO/DIATH  12/2009       Review of Systems  C: NEGATIVE for fever, chills, change in weight  I: NEGATIVE for worrisome rashes, moles or lesions  E: NEGATIVE for vision changes or irritation  ENT: NEGATIVE for ear, mouth and throat problems  R: NEGATIVE for significant cough or SOB  CV: NEGATIVE for chest pain, palpitations or peripheral edema  GI: NEGATIVE for nausea,  "abdominal pain, heartburn, or change in bowel habits   male:  Pt has Nocturia and hesitancy  M: NEGATIVE for significant arthralgias or myalgia  N: NEGATIVE for weakness, dizziness or paresthesias  E: NEGATIVE for temperature intolerance, skin/hair changes  P: NEGATIVE for changes in mood or affect    OBJECTIVE:   /72 (BP Location: Left arm, Patient Position: Chair, Cuff Size: Adult Large)  Pulse 92  Temp 99.2  F (37.3  C) (Oral)  Resp 20  Ht 6' 2.75\" (1.899 m)  Wt 241 lb (109.3 kg)  SpO2 96%  BMI 30.32 kg/m2    Physical Exam  GENERAL: healthy, alert and no distress  EYES: Eyes grossly normal to inspection, PERRL and conjunctivae and sclerae normal  HENT: ear canals and TM's normal, nose and mouth without ulcers or lesions  NECK: no adenopathy, no asymmetry, masses, or scars and thyroid normal to palpation  RESP: lungs clear to auscultation - no rales, rhonchi or wheezes  CV: regular rate and rhythm, normal S1 S2, no S3 or S4, no murmur, click or rub, no peripheral edema and peripheral pulses strong  ABDOMEN: soft, nontender, no hepatosplenomegaly, no masses and bowel sounds normal   (male): normal male genitalia without lesions or urethral discharge, no hernia  RECTAL: prostate + enlarged, nontender  ,  No nodules  MS: no gross musculoskeletal defects noted, no edema  SKIN: no suspicious lesions or rashes  NEURO: Normal strength and tone, mentation intact and speech normal  PSYCH: mentation appears normal, affect normal/bright    ASSESSMENT/PLAN:   1. Routine history and physical examination of adult      2. Hyperlipidemia LDL goal <130  Labs pending   - Lipid panel reflex to direct LDL Fasting  - atorvastatin (LIPITOR) 20 MG tablet; TAKE 1 TABLET (20 MG) BY MOUTH DAILY  Dispense: 90 tablet; Refill: 3    3. Hypertension goal BP (blood pressure) < 140/90  controlled  - BASIC METABOLIC PANEL  - lisinopril-hydrochlorothiazide (PRINZIDE/ZESTORETIC) 20-12.5 MG per tablet; Take 1 tablet by mouth daily " due for office visit for further refills.  Dispense: 90 tablet; Refill: 3    4. GABE (generalized anxiety disorder)  Stable   Refilled   - FLUoxetine (PROZAC) 10 MG capsule; TAKE 1 CAPSULE (10 MG) BY MOUTH DAILY  Dispense: 30 capsule; Refill: 6  - order for DME; Light for SAD. 10,000 lux, to use daily for 15 minutes per day starting in October, increase to 30 minutes per day starting in November, December and January, decrease to 15 minutes daily in February through March.  Dispense: 1 each; Refill: 0    5. History of ETOH abuse  Pt quit in December 2017  - Hepatic panel    6. Impaired glucose tolerance  Advised watch diet  - Hemoglobin A1c    7. Need for prophylactic vaccination and inoculation against influenza  Advised -Pt declined    8. Screening for prostate cancer    - PSA, screen    9. Screen for colon cancer  Advised importance of screen  - GASTROENTEROLOGY ADULT REF PROCEDURE ONLY    10. Gastroesophageal reflux disease without esophagitis  Stable     11. Benign prostatic hyperplasia with urinary frequency  Advised try Flomax  SEE [facility] care orders  The potential side effects of this medication have been discussed with the patient.  Call if any significant problems with these are experienced.  If not better see Urology  - tamsulosin (FLOMAX) 0.4 MG capsule; Take 1 capsule (0.4 mg) by mouth daily  Dispense: 30 capsule; Refill: 1  - UROLOGY ADULT REFERRAL    12. History of tobacco use  Advised   Advised check with his insurance regarding coverage  - Prof fee: Shared Decisionmaking for Lung Cancer Screening  - CT Chest Lung Cancer Scrn Low Dose wo; Future  - Okay for Smoking Cessation Study (PLUTO) to Contact Patient    COUNSELING:   Reviewed preventive health counseling, as reflected in patient instructions       Regular exercise       Healthy diet/nutrition       Vision screening       Hearing screening       Immunizations    Declined: Influenza due to Conscientious objector               Aspirin Prophylaxsis    "    Colon cancer screening       Prostate cancer screening       Consider lung cancer screening for ages 55-80 years and 30 pack-year smoking history         The 10-year ASCVD risk score (Nakul MENENDEZ Jr, et al., 2013) is: 4.6%    Values used to calculate the score:      Age: 57 years      Sex: Male      Is Non- : No      Diabetic: No      Tobacco smoker: No      Systolic Blood Pressure: 106 mmHg      Is BP treated: Yes      HDL Cholesterol: 66 mg/dL      Total Cholesterol: 224 mg/dL       Advance Care Planning         reports that he quit smoking about 5 years ago. He has never used smokeless tobacco.    Estimated body mass index is 30.32 kg/(m^2) as calculated from the following:    Height as of this encounter: 6' 2.75\" (1.899 m).    Weight as of this encounter: 241 lb (109.3 kg).   Weight management plan: Low ross diet/Exercise    Counseling Resources:  ATP IV Guidelines  Pooled Cohorts Equation Calculator  FRAX Risk Assessment  ICSI Preventive Guidelines  Dietary Guidelines for Americans, 2010  USDA's MyPlate  ASA Prophylaxis  Lung CA Screening    Kristen Uribe MD  [facility]  "

## 2018-02-07 NOTE — PATIENT INSTRUCTIONS
[facility]    If you have any questions regarding to your visit please contact your care team:       Team Red:   Clinic Hours Telephone Number   Dr. Geena Wilburn, NP   7am-7pm  Monday - Thursday   7am-5pm  Fridays  [phone]  (Appointment scheduling available 24/7)    Questions about your visit?   Team Line  [phone]   Urgent Care - [place] - 11am-9pm Monday-Friday Saturday-Sunday- 9am-5pm   [place] - 5pm-9pm Monday-Friday Saturday-Sunday- 9am-5pm  [phone] - [place]   [phone] - [place]       What options do I have for visits at the clinic other than the traditional office visit?  To expand how we care for you, many of our providers are utilizing electronic visits (e-visits) and telephone visits, when medically appropriate, for interactions with their patients rather than a visit in the clinic.   We also offer nurse visits for many medical concerns. Just like any other service, we will bill your insurance company for this type of visit based on time spent on the phone with your provider. Not all insurance companies cover these visits. Please check with your medical insurance if this type of visit is covered. You will be responsible for any charges that are not paid by your insurance.      E-visits via [employer]:  generally incur a $35.00 fee.  Telephone visits:  Time spent on the phone: *charged based on time that is spent on the phone in increments of 10 minutes. Estimated cost:   5-10 mins $30.00   11-20 mins. $59.00   21-30 mins. $85.00     Use [employer] (secure email communication and access to your chart) to send your primary care provider a message or make an appointment. Ask someone on your Team how to sign up for [employer].  For a Price Quote for your services, please call our Consumer Price Line at [phone].      As always, Thank you for trusting us with your health care needs!    Darian STANLEY  [place]      Lung Cancer Screening   Frequently Asked Questions  If you are at high-risk for lung cancer, getting screened with low-dose computed tomography (LDCT) every year can help save your life. This handout offers answers to some of the most common questions about lung cancer screening. If you have other questions, please call [phone] ([phone]).     What is it?  Lung cancer screening uses special X-ray technology to create an image of your lung tissue. The exam is quick and easy and takes less than 10 seconds. We don t give you any medicine or use any needles. You can eat before and after the exam. You don t need to change your clothes as long as the clothing on your chest doesn t contain metal. But, you do need to be able to hold your breath for at least 6 seconds during the exam.    What is the goal of lung cancer screening?  The goal of lung cancer screening is to save lives. Many times, lung cancer is not found until a person starts having physical symptoms. Lung cancer screening can help detect lung cancer in the earliest stages when it may be easier to treat.    Who should be screened for lung cancer?  We suggest lung cancer screening for anyone who is at high-risk for lung cancer. You are in the high-risk group if you:      are between the ages of 55 and 79, and    have smoked at least 1 pack of cigarettes a day for 30 or more years, and    still smoke or have quit within the past 15 years.    However, if you have a new cough or shortness of breath, you should talk to your doctor before being screened.    Some national lung health advocacy groups also recommend screening for people ages 50 to 79 who have smoked an average of 1 pack of cigarettes a day for 20 years. They must also have at least 1 other risk factor for lung cancer, not including exposure to secondhand smoke. Other risk factors are having had cancer in the past, emphysema, pulmonary fibrosis, COPD, a family history of  lung cancer, or exposure to certain materials such as arsenic, asbestos, beryllium, cadmium, chromium, diesel fumes, nickel, radon or silica. Your care team can help you know if you have one of these risk factors.     Why does it matter if I have symptoms?  Certain symptoms can be a sign that you have a condition in your lungs that should be checked and treated by your doctor. These symptoms include fever, chest pain, a new or changing cough, shortness of breath that you have never felt before, coughing up blood or unexplained weight loss. Having any of these symptoms can greatly affect the results of lung cancer screening.       Should all smokers get an LDCT lung cancer screening exam?  It depends. Lung cancer screening is for a very specific group of men and women who have a history of heavy smoking over a long period of time (see  Who should be screened for lung cancer  above).  I am in the high-risk group, but have been diagnosed with cancer in the past. Is LDCT lung cancer screening right for me?  In some cases, you should not have LDCT lung screening, such as when your doctor is already following your cancer with CT scan studies. Your doctor will help you decide if LDCT lung screening is right for you.  Do I need to have a screening exam every year?  Yes. If you are in the high-risk group described earlier, you should get an LDCT lung cancer screening exam every year until you are 79, or are no longer willing or able to undergo screening and possible procedures to diagnose and treat lung cancer.  How effective is LDCT at preventing death from lung cancer?  Studies have shown that LDCT lung cancer screening can lower the risk of death from lung cancer by 20 percent in people who are at high-risk.  What are the risks?  There are some risks and limitations of LDCT lung cancer screening. We want to make sure you understand the risks and benefits, so please let us know if you have any questions. Your doctor may  want to talk with you more about these risks.    Radiation exposure: As with any exam that uses radiation, there is a very small increased risk of cancer. The amount of radiation in LDCT is small about the same amount a person would get from a mammogram. Your doctor orders the exam when he or she feels the potential benefits outweigh the risks.    False negatives: No test is perfect, including LDCT. It is possible that you may have a medical condition, including lung cancer, that is not found during your exam. This is called a false negative result.    False positives and more testing: LDCT very often finds something in the lung that could be cancer, but in fact is not. This is called a false positive result. False positive tests often cause anxiety. To make sure these findings are not cancer, you may need to have more tests. These tests will be done only if you give us permission. Sometimes patients need a treatment that can have side effects, such as a biopsy. For more information on false positives, see  What can I expect from the results?     Findings not related to lung cancer: Your LDCT exam also takes pictures of areas of your body next to your lungs. In a very small number of cases, the CT scan will show an abnormal finding in one of these areas, such as your kidneys, adrenal glands, liver or thyroid. This finding may not be serious, but you may need more tests. Your doctor can help you decide what other tests you may need, if any.  What can I expect from the results?  About 1 out of 4 LDCT exams will find something that may need more tests. Most of the time, these findings are lung nodules. Lung nodules are very small collections of tissue in the lung. These nodules are very common, and the vast majority more than 97 percent are not cancer (benign). Most are normal lymph nodes or small areas of scarring from past infections.  But, if a small lung nodule is found to be cancer, the cancer can be cured more  than 90 percent of the time. To know if the nodule is cancer, we may need to get more images before your next yearly screening exam. If the nodule has suspicious features (for example, it is large, has an odd shape or grows over time), we will refer you to a specialist for further testing.  Will my doctor also get the results?  Yes. Your doctor will get a copy of your results.  Is it okay to keep smoking now that there s a cancer screening exam?  No. Tobacco is one of the strongest cancer-causing agents. It causes not only lung cancer, but other cancers and cardiovascular (heart) diseases as well. The damage caused by smoking builds over time. This means that the longer you smoke, the higher your risk of disease. While it is never too late to quit, the sooner you quit, the better.  Where can I find help to quit smoking?  The best way to prevent lung cancer is to stop smoking. If you have already quit smoking, congratulations and keep it up! For help on quitting smoking, please call [employer] at 0-519-392-MTPR ([phone] or the American Cancer Society at 1-742.738.4319 to find local resources near you.  One-on-one health coaching:  If you d prefer to work individually with a health care provider on tobacco cessation, we offer:      Medication Therapy Management:  Our specially trained pharmacists work closely with you and your doctor to help you quit smoking.  Call [phone] or [phone] (toll free).     Can Do: Health coaching offered by [facility].  [email or web address]

## 2018-02-07 NOTE — MR AVS SNAPSHOT
After Visit Summary   2/7/2018    Kody Salas    MRN: [id number]           Patient Information     Date Of Birth          1960        Visit Information        Provider Department      2/7/2018 8:40 AM Kristen Uribe MD [facility]        Today's Diagnoses     Hyperlipidemia LDL goal <130    -  1    Routine history and physical examination of adult        Hypertension goal BP (blood pressure) < 140/90        GABE (generalized anxiety disorder)        History of ETOH abuse        Impaired glucose tolerance        Need for prophylactic vaccination and inoculation against influenza        Screening for prostate cancer        Screen for colon cancer        Gastroesophageal reflux disease without esophagitis        Benign prostatic hyperplasia with urinary frequency        History of tobacco use          Care Instructions    [facility]    If you have any questions regarding to your visit please contact your care team:       Team Red:   Clinic Hours Telephone Number   Dr. Geena Wilburn, NP   7am-7pm  Monday - Thursday   7am-5pm  Fridays  [phone]  (Appointment scheduling available 24/7)    Questions about your visit?   Team Line  [phone]   Urgent [facility] - [place] [facility] [place] [facility] - 11am-9pm Monday-Friday Saturday-Sunday- 9am-5pm   [place] - 5pm-9pm Monday-Friday Saturday-Sunday- 9am-5pm  [phone] - [place]   [phone] - [place]       What options do I have for visits at the clinic other than the traditional office visit?  To expand how we care for you, many of our providers are utilizing electronic visits (e-visits) and telephone visits, when medically appropriate, for interactions with their patients rather than a visit in the clinic.   We also offer nurse visits for many medical concerns. Just like any other service, we will bill your insurance company for this type of visit based on  time spent on the phone with your provider. Not all insurance companies cover these visits. Please check with your medical insurance if this type of visit is covered. You will be responsible for any charges that are not paid by your insurance.      E-visits via [employer]:  generally incur a $35.00 fee.  Telephone visits:  Time spent on the phone: *charged based on time that is spent on the phone in increments of 10 minutes. Estimated cost:   5-10 mins $30.00   11-20 mins. $59.00   21-30 mins. $85.00     Use [employer] (secure email communication and access to your chart) to send your primary care provider a message or make an appointment. Ask someone on your Team how to sign up for [employer].  For a Price Quote for your services, please call our [place] Line at [phone].      As always, Thank you for trusting us with your health care needs!    Darian Rasmussen      Lung Cancer Screening   Frequently Asked Questions  If you are at high-risk for lung cancer, getting screened with low-dose computed tomography (LDCT) every year can help save your life. This handout offers answers to some of the most common questions about lung cancer screening. If you have other questions, please call [phone] ([phone]).     What is it?  Lung cancer screening uses special X-ray technology to create an image of your lung tissue. The exam is quick and easy and takes less than 10 seconds. We don t give you any medicine or use any needles. You can eat before and after the exam. You don t need to change your clothes as long as the clothing on your chest doesn t contain metal. But, you do need to be able to hold your breath for at least 6 seconds during the exam.    What is the goal of lung cancer screening?  The goal of lung cancer screening is to save lives. Many times, lung cancer is not found until a person starts having physical symptoms. Lung cancer screening can help detect lung cancer in the earliest stages when  it may be easier to treat.    Who should be screened for lung cancer?  We suggest lung cancer screening for anyone who is at high-risk for lung cancer. You are in the high-risk group if you:      are between the ages of 55 and 79, and    have smoked at least 1 pack of cigarettes a day for 30 or more years, and    still smoke or have quit within the past 15 years.    However, if you have a new cough or shortness of breath, you should talk to your doctor before being screened.    Some national lung health advocacy groups also recommend screening for people ages 50 to 79 who have smoked an average of 1 pack of cigarettes a day for 20 years. They must also have at least 1 other risk factor for lung cancer, not including exposure to secondhand smoke. Other risk factors are having had cancer in the past, emphysema, pulmonary fibrosis, COPD, a family history of lung cancer, or exposure to certain materials such as arsenic, asbestos, beryllium, cadmium, chromium, diesel fumes, nickel, radon or silica. Your care team can help you know if you have one of these risk factors.     Why does it matter if I have symptoms?  Certain symptoms can be a sign that you have a condition in your lungs that should be checked and treated by your doctor. These symptoms include fever, chest pain, a new or changing cough, shortness of breath that you have never felt before, coughing up blood or unexplained weight loss. Having any of these symptoms can greatly affect the results of lung cancer screening.       Should all smokers get an LDCT lung cancer screening exam?  It depends. Lung cancer screening is for a very specific group of men and women who have a history of heavy smoking over a long period of time (see  Who should be screened for lung cancer  above).  I am in the high-risk group, but have been diagnosed with cancer in the past. Is LDCT lung cancer screening right for me?  In some cases, you should not have LDCT lung screening, such as  when your doctor is already following your cancer with CT scan studies. Your doctor will help you decide if LDCT lung screening is right for you.  Do I need to have a screening exam every year?  Yes. If you are in the high-risk group described earlier, you should get an LDCT lung cancer screening exam every year until you are 79, or are no longer willing or able to undergo screening and possible procedures to diagnose and treat lung cancer.  How effective is LDCT at preventing death from lung cancer?  Studies have shown that LDCT lung cancer screening can lower the risk of death from lung cancer by 20 percent in people who are at high-risk.  What are the risks?  There are some risks and limitations of LDCT lung cancer screening. We want to make sure you understand the risks and benefits, so please let us know if you have any questions. Your doctor may want to talk with you more about these risks.    Radiation exposure: As with any exam that uses radiation, there is a very small increased risk of cancer. The amount of radiation in LDCT is small--about the same amount a person would get from a mammogram. Your doctor orders the exam when he or she feels the potential benefits outweigh the risks.    False negatives: No test is perfect, including LDCT. It is possible that you may have a medical condition, including lung cancer, that is not found during your exam. This is called a false negative result.    False positives and more testing: LDCT very often finds something in the lung that could be cancer, but in fact is not. This is called a false positive result. False positive tests often cause anxiety. To make sure these findings are not cancer, you may need to have more tests. These tests will be done only if you give us permission. Sometimes patients need a treatment that can have side effects, such as a biopsy. For more information on false positives, see  What can I expect from the results?     Findings not related  to lung cancer: Your LDCT exam also takes pictures of areas of your body next to your lungs. In a very small number of cases, the CT scan will show an abnormal finding in one of these areas, such as your kidneys, adrenal glands, liver or thyroid. This finding may not be serious, but you may need more tests. Your doctor can help you decide what other tests you may need, if any.  What can I expect from the results?  About 1 out of 4 LDCT exams will find something that may need more tests. Most of the time, these findings are lung nodules. Lung nodules are very small collections of tissue in the lung. These nodules are very common, and the vast majority--more than 97 percent--are not cancer (benign). Most are normal lymph nodes or small areas of scarring from past infections.  But, if a small lung nodule is found to be cancer, the cancer can be cured more than 90 percent of the time. To know if the nodule is cancer, we may need to get more images before your next yearly screening exam. If the nodule has suspicious features (for example, it is large, has an odd shape or grows over time), we will refer you to a specialist for further testing.  Will my doctor also get the results?  Yes. Your doctor will get a copy of your results.  Is it okay to keep smoking now that there s a cancer screening exam?  No. Tobacco is one of the strongest cancer-causing agents. It causes not only lung cancer, but other cancers and cardiovascular (heart) diseases as well. The damage caused by smoking builds over time. This means that the longer you smoke, the higher your risk of disease. While it is never too late to quit, the sooner you quit, the better.  Where can I find help to quit smoking?  The best way to prevent lung cancer is to stop smoking. If you have already quit smoking, congratulations and keep it up! For help on quitting smoking, please call [employer] at 1-553-294-IVPT (5210) or the American Cancer Society at 1-892.829.5932 to  find local resources near you.  One-on-one health coaching:  If you d prefer to work individually with a health care provider on tobacco cessation, we offer:      Medication Therapy Management:  Our specially trained pharmacists work closely with you and your doctor to help you quit smoking.  Call [phone] or [phone] (toll free).     Can Do: Health coaching offered by [facility].  [email or web address]            Follow-ups after your visit        Additional Services     GASTROENTEROLOGY ADULT REF PROCEDURE ONLY       Last Lab Result: Creatinine (mg/dL)       Date                     Value                 02/03/2017               0.66             ----------  Body mass index is 30.32 kg/(m^2).     Needed:  No  Language:  English    Patient will be contacted to schedule procedure.     Please be aware that coverage of these services is subject to the terms and limitations of your health insurance plan.  Call member services at your health plan with any benefit or coverage questions.  Any procedures must be performed at a [facility] facility OR coordinated by your clinic's referral office.    Please bring the following with you to your appointment:    (1) Any X-Rays, CTs or MRIs which have been performed.  Contact the facility where they were done to arrange for  prior to your scheduled appointment.    (2) List of current medications   (3) This referral request   (4) Any documents/labs given to you for this referral            UROLOGY ADULT REFERRAL       Your provider has referred you to: [facility] [phone]   [email or web address]    Please be aware that coverage of these services is subject to the terms and limitations of your health insurance plan.  Call member services at your health plan with any benefit or coverage questions.      Please bring the following to your appointment:    >>   Any x-rays,  CTs or MRIs which have been performed.  Contact the facility where they were done to arrange for  prior to your scheduled appointment.  Any new CT, MRI or other procedures ordered by your specialist must be performed at a [facility] facility or coordinated by your clinic's referral office.    >>   List of current medications   >>   This referral request   >>   Any documents/labs given to you for this referral                  Future tests that were ordered for you today     Open Future Orders        Priority Expected Expires Ordered    CT Chest Lung Cancer Scrn Low Dose wo Routine  2/7/2019 2/7/2018            Who to contact     If you have questions or need follow up information about today's clinic visit or your schedule please contact [facility] directly at [phone].  Normal or non-critical lab and imaging results will be communicated to you by apstratahart, letter or phone within 4 business days after the clinic has received the results. If you do not hear from us within 7 days, please contact the clinic through [employer] or phone. If you have a critical or abnormal lab result, we will notify you by phone as soon as possible.  Submit refill requests through [employer] or call your pharmacy and they will forward the refill request to us. Please allow 3 business days for your refill to be completed.          Additional Information About Your Visit        [employer] Information     [employer] gives you secure access to your electronic health record. If you see a primary care provider, you can also send messages to your care team and make appointments. If you have questions, please call your primary care clinic.  If you do not have a primary care provider, please call [phone] and they will assist you.        Care EveryWhere ID     This is your Care EveryWhere ID. This could be used by other organizations to access your [facility] medical records  [id number]        Your Vitals Were     Pulse Temperature  "Respirations Height Pulse Oximetry BMI (Body Mass Index)    92 99.2  F (37.3  C) (Oral) 20 6' 2.75\" (1.899 m) 96% 30.32 kg/m2       Blood Pressure from Last 3 Encounters:   02/07/18 106/72   02/03/17 132/81   01/06/16 126/88    Weight from Last 3 Encounters:   02/07/18 241 lb (109.3 kg)   02/03/17 248 lb (112.5 kg)   01/06/16 234 lb (106.1 kg)              We Performed the Following     BASIC METABOLIC PANEL     GASTROENTEROLOGY ADULT REF PROCEDURE ONLY     Hemoglobin A1c     Hepatic panel     Lipid panel reflex to direct LDL Fasting     Okay for Smoking Cessation Study (PLUTO) to Contact Patient     Prof fee: Shared Decisionmaking for Lung Cancer Screening     PSA, screen     UROLOGY ADULT REFERRAL          Today's Medication Changes          These changes are accurate as of 2/7/18  9:44 AM.  If you have any questions, ask your nurse or doctor.               Start taking these medicines.        Dose/Directions    order for DME   Used for:  GABE (generalized anxiety disorder)   Started by:  Kristen Uribe MD        Light for SAD. 10,000 lux, to use daily for 15 minutes per day starting in October, increase to 30 minutes per day starting in November, December and January, decrease to 15 minutes daily in February through March.   Quantity:  1 each   Refills:  0       tamsulosin 0.4 MG capsule   Commonly known as:  FLOMAX   Used for:  Benign prostatic hyperplasia with urinary frequency   Started by:  Kristen Uribe MD        Dose:  0.4 mg   Take 1 capsule (0.4 mg) by mouth daily   Quantity:  30 capsule   Refills:  1         These medicines have changed or have updated prescriptions.        Dose/Directions    FLUoxetine 10 MG capsule   Commonly known as:  PROzac   This may have changed:  See the new instructions.   Used for:  GABE (generalized anxiety disorder)   Changed by:  Kristen Uribe MD        TAKE 1 CAPSULE (10 MG) BY MOUTH DAILY   Quantity:  30 capsule   Refills:  6            Where to get your medicines      These " medications were sent to [id number] - [place] - [address]  [address], [place] [address]     Phone:  [phone]     atorvastatin 20 MG tablet    FLUoxetine 10 MG capsule    lisinopril-hydrochlorothiazide 20-12.5 MG per tablet    tamsulosin 0.4 MG capsule         Some of these will need a paper prescription and others can be bought over the counter.  Ask your nurse if you have questions.     Bring a paper prescription for each of these medications     order for DME                Primary Care Provider Office Phone # Fax #    Kristen Uribe [phone] [address] [address]        Equal Access to Services     [facility] : Jacky Justice, elvis rader, nancy dennis, janiya russ. So [facility] [phone].    ATENCIÓN: Si habla español, tiene a coy disposición servicios gratuitos de asistencia lingüística. Llame al [phone].    We comply with applicable federal civil rights laws and [place] laws. We do not discriminate on the basis of race, color, national origin, age, disability, sex, sexual orientation, or gender identity.            Thank you!     Thank you for choosing [facility]  for your care. Our goal is always to provide you with excellent care. Hearing back from our patients is one way we can continue to improve our services. Please take a few minutes to complete the written survey that you may receive in the mail after your visit with us. Thank you!             Your Updated Medication List - Protect others around you: Learn how to safely use, store and throw away your medicines at www.disposemymeds.org.          This list is accurate as of 2/7/18  9:44 AM.  Always use your most recent med list.                   Brand Name Dispense Instructions for use Diagnosis    atorvastatin 20 MG tablet    LIPITOR    90 tablet    TAKE 1 TABLET (20 MG) BY MOUTH DAILY     Hyperlipidemia LDL goal <130       FLUoxetine 10 MG capsule    PROzac    30 capsule    TAKE 1 CAPSULE (10 MG) BY MOUTH DAILY    GABE (generalized anxiety disorder)       lisinopril-hydrochlorothiazide 20-12.5 MG per tablet    PRINZIDE/ZESTORETIC    90 tablet    Take 1 tablet by mouth daily due for office visit for further refills.    Hypertension goal BP (blood pressure) < 140/90       mometasone 0.1 % cream    ELOCON    45 g    APPLY TO AFFECTED AREA DAILY AS DIRECTED    Eczema       order for DME     1 each    Light for SAD. 10,000 lux, to use daily for 15 minutes per day starting in October, increase to 30 minutes per day starting in November, December and January, decrease to 15 minutes daily in February through March.    GABE (generalized anxiety disorder)       tamsulosin 0.4 MG capsule    FLOMAX    30 capsule    Take 1 capsule (0.4 mg) by mouth daily    Benign prostatic hyperplasia with urinary frequency

## 2018-02-08 ASSESSMENT — ANXIETY QUESTIONNAIRES: GAD7 TOTAL SCORE: 5

## 2018-02-08 ASSESSMENT — PATIENT HEALTH QUESTIONNAIRE - PHQ9: SUM OF ALL RESPONSES TO PHQ QUESTIONS 1-9: 4

## 2018-02-09 ENCOUNTER — RADIANT APPOINTMENT (OUTPATIENT)
Dept: CT IMAGING | Facility: CLINIC | Age: 58
End: 2018-02-09
Attending: FAMILY MEDICINE
Payer: COMMERCIAL

## 2018-02-09 DIAGNOSIS — Z87.891 HISTORY OF TOBACCO USE: ICD-10-CM

## 2018-02-09 PROCEDURE — G0297 LDCT FOR LUNG CA SCREEN: HCPCS | Mod: TC

## 2018-02-12 ENCOUNTER — MYC MEDICAL ADVICE (OUTPATIENT)
Dept: FAMILY MEDICINE | Facility: CLINIC | Age: 58
End: 2018-02-12

## 2018-02-13 NOTE — TELEPHONE ENCOUNTER
[email or web address] message sent with letter information and informed that letter has been sent to patients address on file.   Kena Huber RN

## 2018-02-19 ENCOUNTER — HOSPITAL ENCOUNTER (OUTPATIENT)
Facility: AMBULATORY SURGERY CENTER | Age: 58
Discharge: HOME | End: 2018-02-19
Attending: SURGERY | Admitting: SURGERY
Payer: COMMERCIAL

## 2018-02-19 ENCOUNTER — SURGERY (OUTPATIENT)
Age: 58
End: 2018-02-19

## 2018-02-19 VITALS
OXYGEN SATURATION: 96 % | SYSTOLIC BLOOD PRESSURE: 114 MMHG | HEART RATE: 95 BPM | RESPIRATION RATE: 16 BRPM | DIASTOLIC BLOOD PRESSURE: 80 MMHG | TEMPERATURE: 97.8 F

## 2018-02-19 LAB — COLONOSCOPY: NORMAL

## 2018-02-19 PROCEDURE — 88305 TISSUE EXAM BY PATHOLOGIST: CPT | Performed by: SURGERY

## 2018-02-19 PROCEDURE — G8918 PT W/O PREOP ORDER IV AB PRO: HCPCS

## 2018-02-19 PROCEDURE — 99152 MOD SED SAME PHYS/QHP 5/>YRS: CPT | Performed by: SURGERY

## 2018-02-19 PROCEDURE — 45385 COLONOSCOPY W/LESION REMOVAL: CPT | Mod: PT | Performed by: SURGERY

## 2018-02-19 PROCEDURE — 45385 COLONOSCOPY W/LESION REMOVAL: CPT

## 2018-02-19 PROCEDURE — G8907 PT DOC NO EVENTS ON DISCHARG: HCPCS

## 2018-02-19 RX ORDER — LIDOCAINE 40 MG/G
CREAM TOPICAL
Status: DISCONTINUED | OUTPATIENT
Start: 2018-02-19 | End: 2018-02-20 | Stop reason: HOSPADM

## 2018-02-19 RX ORDER — FENTANYL CITRATE 50 UG/ML
INJECTION, SOLUTION INTRAMUSCULAR; INTRAVENOUS PRN
Status: DISCONTINUED | OUTPATIENT
Start: 2018-02-19 | End: 2018-02-19 | Stop reason: HOSPADM

## 2018-02-19 RX ADMIN — FENTANYL CITRATE 50 MCG: 50 INJECTION, SOLUTION INTRAMUSCULAR; INTRAVENOUS at 12:18

## 2018-02-19 RX ADMIN — FENTANYL CITRATE 50 MCG: 50 INJECTION, SOLUTION INTRAMUSCULAR; INTRAVENOUS at 12:11

## 2018-02-19 RX ADMIN — FENTANYL CITRATE 100 MCG: 50 INJECTION, SOLUTION INTRAMUSCULAR; INTRAVENOUS at 12:07

## 2018-02-20 DIAGNOSIS — E78.5 HYPERLIPIDEMIA LDL GOAL <130: ICD-10-CM

## 2018-02-20 RX ORDER — ATORVASTATIN CALCIUM 20 MG/1
TABLET, FILM COATED ORAL
Qty: 90 TABLET | Refills: 3 | OUTPATIENT
Start: 2018-02-20

## 2018-02-20 NOTE — TELEPHONE ENCOUNTER
Please call pharmacy and confirm duplicate request, thank you.     Marie Grajeda RN on 2/20/2018 at 2:56 PM

## 2018-02-20 NOTE — TELEPHONE ENCOUNTER
Called pharmacy, they stated we can disregard this order. They have a refill in place for the medication. Confirmed duplicate request.   Megan MARI MA

## 2018-02-21 LAB — COPATH REPORT: NORMAL

## 2018-02-21 NOTE — TELEPHONE ENCOUNTER
Refused Prescriptions:                       Disp   Refills    atorvastatin (LIPITOR) 20 MG tablet [Pharm*90 tab*3        Sig: TAKE 1 TABLET (20 MG) BY MOUTH DAILY  Refused By: MARIE HYLTON  Reason for Refusal: Duplicate    Marie Hylton RN on 2/20/2018 at 6:56 PM

## 2018-07-10 NOTE — PROGRESS NOTES
SUBJECTIVE:   Kody Salas is a 57 year old male who presents to clinic today for the following health issues:      Back Pain /Left Hip pain  Pt has Chronic back pain  DDD      Duration: Patient states it has been going on for a while         Specific cause: Spinal Stenosis L5-s1    Description:   Location of pain: low back center   Character of pain: sharp, dull ache, stabbing, burning and cramping  Pain radiation:radiates into the left buttocks, radiates into the left leg and radiates into the left foot  New numbness or weakness in legs, not attributed to pain:  no     Intensity: Currently 5/10, At its worst 8/10    History:   Pain interferes with job: No  History of back problems: no prior back problems  Any previous MRI or X-rays: Yes--at [facility].   Sees a specialist for back pain:  No  Therapies tried without relief: cold, heat and Physical Therapy    Alleviating factors:   Improved by: Slightly improved by the new sleep number bed       Precipitating factors:  Worsened by: Bending, Standing and Sitting and walking           Accompanying Signs & Symptoms:  Risk of Fracture:  None  Risk of Cauda Equina:  None  Risk of Infection:  None  Risk of Cancer:  None  Risk of Ankylosing Spondylitis:  Onset at age <35, male, AND morning back stiffness. no                Hip Pain    Onset: Patient states about 3 weeks     Description:   Location: Left Hip  Character: Sharp, Dull ache, Stabbing    Intensity: severe    Progression of Symptoms: same    Accompanying Signs & Symptoms:  Other symptoms: numbness and tingling Left Lower leg    History:   Previous similar pain: no       Precipitating factors:   Trauma or overuse: no     Alleviating factors:  Improved by: Ibuprofen, Aleve - Short term relief. Floating in the pool helps as well    Therapies Tried and outcome: Ibuprofen, Aleve             Problem list and histories reviewed & adjusted, as indicated.  Additional history: as documented    Patient Active Problem  List   Diagnosis     Hyperlipidemia LDL goal <130     Seasonal allergies     Hypertension goal BP (blood pressure) < 140/90     GABE (generalized anxiety disorder)     GERD (gastroesophageal reflux disease)     Eczema     Obesity     History of ETOH abuse     Chronic back pain     Impaired glucose tolerance     Anxiety     Malignant melanoma of left upper extremity including shoulder (H)     Past Surgical History:   Procedure Laterality Date     COLONOSCOPY WITH CO2 INSUFFLATION N/A 2/19/2018    Procedure: COLONOSCOPY WITH CO2 INSUFFLATION;  COLON-SCREENING / ASHER ;  Surgeon: Brandon Ruby MD;  Location: [facility]     HC KNEE SCOPE,MED/LAT MENISCUS REPAIR  2003    right     PROPHYLAXIS RETINA DETACH,CRYO/DIATH  12/2009       Social History   Substance Use Topics     Smoking status: Former Smoker     Packs/day: 1.00     Years: 35.00     Quit date: 6/15/2012     Smokeless tobacco: Never Used     Alcohol use No      Comment: quit January 2015     Family History   Problem Relation Age of Onset     Hypertension Mother      Hypertension Father      Hypertension Brother      Hypertension Maternal Grandmother      Hypertension Maternal Grandfather      Hypertension Paternal Grandmother      Hypertension Paternal Grandfather      HEART DISEASE Paternal Grandfather      Anesthesia Reaction No family hx of          Current Outpatient Prescriptions   Medication Sig Dispense Refill     atorvastatin (LIPITOR) 20 MG tablet TAKE 1 TABLET (20 MG) BY MOUTH DAILY 90 tablet 3     cyclobenzaprine (FLEXERIL) 10 MG tablet Take 0.5-1 tablets (5-10 mg) by mouth 3 times daily as needed for muscle spasms 30 tablet 1     FLUoxetine (PROZAC) 10 MG capsule TAKE 1 CAPSULE (10 MG) BY MOUTH DAILY 30 capsule 6     ibuprofen (ADVIL/MOTRIN) 800 MG tablet Take 1 tablet (800 mg) by mouth every 8 hours as needed for moderate pain 30 tablet 1     lisinopril-hydrochlorothiazide (PRINZIDE/ZESTORETIC) 20-12.5 MG per tablet Take 1 tablet by mouth daily  due for office visit for further refills. 90 tablet 3     methylPREDNISolone (MEDROL DOSEPAK) 4 MG tablet Follow package instructions 21 tablet 0     mometasone (ELOCON) 0.1 % cream APPLY TO AFFECTED AREA DAILY AS DIRECTED 45 g 1     order for DME Light for SAD. 10,000 lux, to use daily for 15 minutes per day starting in October, increase to 30 minutes per day starting in November, December and January, decrease to 15 minutes daily in February through March. 1 each 0     azithromycin (ZITHROMAX) 250 MG tablet Two tablets first day, then one tablet daily for four days. (Patient not taking: Reported on 7/13/2018) 6 tablet 0     tamsulosin (FLOMAX) 0.4 MG capsule TAKE 1 CAPSULE BY MOUTH EVERY DAY (Patient not taking: Reported on 7/13/2018) 30 capsule 5     No Known Allergies  Recent Labs   Lab Test  02/07/18   0947  02/03/17   0928  01/06/16   0837   A1C  5.5   --    --    LDL  126*  135*  126*   HDL  41  66  37*   TRIG  95  114  130   ALT  38  50  26   CR  0.74  0.66  0.90   GFRESTIMATED  >90  >90  Non African American GFR Calc    87   GFRESTBLACK  >90  >90  African American GFR Calc    >90   GFR Calc     POTASSIUM  3.7  4.0  4.7   TSH   --   3.17   --       BP Readings from Last 3 Encounters:   07/13/18 138/86   02/19/18 114/80   02/07/18 106/72    Wt Readings from Last 3 Encounters:   07/13/18 240 lb 8 oz (109.1 kg)   02/07/18 241 lb (109.3 kg)   02/03/17 248 lb (112.5 kg)                  Labs reviewed in EPIC    Reviewed and updated as needed this visit by clinical staff  Tobacco  Allergies  Meds  Problems  Med Hx  Surg Hx  Fam Hx  Soc Hx        Reviewed and updated as needed this visit by Provider  Tobacco  Allergies  Meds  Problems         ROS:  CONSTITUTIONAL: NEGATIVE for fever, chills, change in weight  INTEGUMENTARY/SKIN: NEGATIVE for worrisome rashes, moles or lesions  RESP: NEGATIVE for significant cough or SOB    GI: NEGATIVE for nausea, abdominal pain, heartburn, or change in  "bowel habits  : None  : none  MUSCULOSKELETAL: as above  NEURO: NEGATIVE for weakness, dizziness or paresthesias      OBJECTIVE:     /86  Pulse 108  Temp 97.7  F (36.5  C) (Oral)  Resp 17  Ht 6' 2.75\" (1.899 m)  Wt 240 lb 8 oz (109.1 kg)  SpO2 98%  BMI 30.26 kg/m2  Body mass index is 30.26 kg/(m^2).  GENERAL: healthy, alert and no distress  GENERAL: alert, no distress and over weight  ABDOMEN: soft, nontender, no hepatosplenomegaly, no masses and bowel sounds normal  MS: no gross musculoskeletal defects noted, no edema  NEURO: Normal strength and tone, mentation intact and speech normal  NEURO: Normal strength and tone, sensory exam grossly normal and mentation intact  BACK: no CVA tenderness, no paralumbar tenderness  Comprehensive back pain exam:  No tenderness, Pain limits the following motions: has some pain with bending, Lower extremity strength functional and equal on both sides, Lower extremity reflexes within normal limits bilaterally, Lower extremity sensation normal and equal on both sides and Straight leg raise negative bilaterally   No tenderness left Hip    Diagnostic Test Results:  Xray Hip    ASSESSMENT/PLAN:         BMI:   Estimated body mass index is 30.26 kg/(m^2) as calculated from the following:    Height as of this encounter: 6' 2.75\" (1.899 m).    Weight as of this encounter: 240 lb 8 oz (109.1 kg).   Weight management plan: Lopw ross diet      1. Chronic left-sided low back pain with left-sided sciatica  SEE [facility] care orders  The potential side effects of this medication have been discussed with the patient.  Call if any significant problems with these are experienced.  Follow up 3-4 weeks if not better  - methylPREDNISolone (MEDROL DOSEPAK) 4 MG tablet; Follow package instructions  Dispense: 21 tablet; Refill: 0  - cyclobenzaprine (FLEXERIL) 10 MG tablet; Take 0.5-1 tablets (5-10 mg) by mouth 3 times daily as needed for muscle spasms  Dispense: 30 tablet; Refill: 1  - " ibuprofen (ADVIL/MOTRIN) 800 MG tablet; Take 1 tablet (800 mg) by mouth every 8 hours as needed for moderate pain  Dispense: 30 tablet; Refill: 1    2. Hip pain, left    - XR Hip Left 2-3 Views; Future    3. DDD (degenerative disc disease), lumbar  As above  - [facility] REFERR[facility]    4. Foraminal stenosis of lumbar region  As above  Advised PT  - ibuprofen (ADVIL/MOTRIN) 800 MG tablet; Take 1 tablet (800 mg) by mouth every 8 hours as needed for moderate pain  Dispense: 30 tablet; Refill: 1  - [facility]    5. Malignant melanoma of left upper extremity including shoulder (H)  [facility] doing well    6. Hypertension goal BP (blood pressure) < 140/90  Borderline  Advised Low salt Diet  Exercise    Kristen Uribe MD  [facility]

## 2018-07-13 ENCOUNTER — RADIANT APPOINTMENT (OUTPATIENT)
Dept: GENERAL RADIOLOGY | Facility: CLINIC | Age: 58
End: 2018-07-13
Attending: FAMILY MEDICINE
Payer: COMMERCIAL

## 2018-07-13 ENCOUNTER — OFFICE VISIT (OUTPATIENT)
Dept: FAMILY MEDICINE | Facility: CLINIC | Age: 58
End: 2018-07-13
Payer: COMMERCIAL

## 2018-07-13 VITALS
DIASTOLIC BLOOD PRESSURE: 86 MMHG | RESPIRATION RATE: 17 BRPM | WEIGHT: 240.5 LBS | TEMPERATURE: 97.7 F | OXYGEN SATURATION: 98 % | HEIGHT: 75 IN | HEART RATE: 108 BPM | SYSTOLIC BLOOD PRESSURE: 138 MMHG | BODY MASS INDEX: 29.9 KG/M2

## 2018-07-13 DIAGNOSIS — G89.29 CHRONIC LEFT-SIDED LOW BACK PAIN WITH LEFT-SIDED SCIATICA: Primary | ICD-10-CM

## 2018-07-13 DIAGNOSIS — C43.62 MALIGNANT MELANOMA OF LEFT UPPER EXTREMITY INCLUDING SHOULDER (H): ICD-10-CM

## 2018-07-13 DIAGNOSIS — M48.061 FORAMINAL STENOSIS OF LUMBAR REGION: ICD-10-CM

## 2018-07-13 DIAGNOSIS — M51.369 DDD (DEGENERATIVE DISC DISEASE), LUMBAR: ICD-10-CM

## 2018-07-13 DIAGNOSIS — I10 HYPERTENSION GOAL BP (BLOOD PRESSURE) < 140/90: ICD-10-CM

## 2018-07-13 DIAGNOSIS — M25.552 HIP PAIN, LEFT: ICD-10-CM

## 2018-07-13 DIAGNOSIS — M54.42 CHRONIC LEFT-SIDED LOW BACK PAIN WITH LEFT-SIDED SCIATICA: Primary | ICD-10-CM

## 2018-07-13 PROCEDURE — 73502 X-RAY EXAM HIP UNI 2-3 VIEWS: CPT

## 2018-07-13 PROCEDURE — 99214 OFFICE O/P EST MOD 30 MIN: CPT | Performed by: FAMILY MEDICINE

## 2018-07-13 RX ORDER — CYCLOBENZAPRINE HCL 10 MG
5-10 TABLET ORAL 3 TIMES DAILY PRN
Qty: 30 TABLET | Refills: 1 | Status: SHIPPED | OUTPATIENT
Start: 2018-07-13 | End: 2019-05-24

## 2018-07-13 RX ORDER — METHYLPREDNISOLONE 4 MG
TABLET, DOSE PACK ORAL
Qty: 21 TABLET | Refills: 0 | Status: SHIPPED | OUTPATIENT
Start: 2018-07-13 | End: 2019-05-24

## 2018-07-13 RX ORDER — IBUPROFEN 800 MG/1
800 TABLET, FILM COATED ORAL EVERY 8 HOURS PRN
Qty: 30 TABLET | Refills: 1 | Status: SHIPPED | OUTPATIENT
Start: 2018-07-13 | End: 2019-05-24

## 2018-07-13 NOTE — MR AVS SNAPSHOT
After Visit Summary   7/13/2018    Kody Salas    MRN: [id number]           Patient Information     Date Of Birth          1960        Visit Information        Provider Department      7/13/2018 10:20 AM Kristen Uribe MD [facility]        Today's Diagnoses     Chronic left-sided low back pain with left-sided sciatica    -  1    Hip pain, left        DDD (degenerative disc disease), lumbar        Foraminal stenosis of lumbar region        Malignant melanoma of left upper extremity including shoulder (H)           Follow-ups after your visit        Additional Services     [facility] REFERRAL       **This order will print in the [facility] Scheduling Office**    Physical Therapy, Hand Therapy and Chiropractic Care are available through:    *[facility]  *[facility]  *[facility]    Call one number to schedule at any of the above locations: [phone].    Your provider has referred you to: As Indicated:     Indication/Reason for Referral:   Onset of Illness:   Therapy Orders: Evaluate and Treat  Special Programs: None  Special Request: None    Jacobo Centeno      Additional Comments for the Therapist or Chiropractor:     Please be aware that coverage of these services is subject to the terms and limitations of your health insurance plan.  Call member services at your health plan with any benefit or coverage questions.      Please bring the following to your appointment:    *Your personal calendar for scheduling future appointments  *Comfortable clothing                  Who to contact     If you have questions or need follow up information about today's clinic visit or your schedule please contact [facility] directly at [phone].  Normal or non-critical lab and imaging results will be communicated to you by MyChart, letter or phone within 4 business days after the clinic has received the  "results. If you do not hear from us within 7 days, please contact the clinic through [facility] or phone. If you have a critical or abnormal lab result, we will notify you by phone as soon as possible.  Submit refill requests through Wayna or call your pharmacy and they will forward the refill request to us. Please allow 3 business days for your refill to be completed.          Additional Information About Your Visit        Wayna Information     [facility]na gives you secure access to your electronic health record. If you see a primary care provider, you can also send messages to your care team and make appointments. If you have questions, please call your primary care clinic.  If you do not have a primary care provider, please call [phone] and they will assist you.        Care EveryWhere ID     This is your Care EveryWhere ID. This could be used by other organizations to access your [facility] medical records  [id number]        Your Vitals Were     Pulse Temperature Respirations Height Pulse Oximetry BMI (Body Mass Index)    108 97.7  F (36.5  C) (Oral) 17 6' 2.75\" (1.899 m) 98% 30.26 kg/m2       Blood Pressure from Last 3 Encounters:   07/13/18 138/86   02/19/18 114/80   02/07/18 106/72    Weight from Last 3 Encounters:   07/13/18 240 lb 8 oz (109.1 kg)   02/07/18 241 lb (109.3 kg)   02/03/17 248 lb (112.5 kg)              We Performed the Following     [facility] REFERRAL          Today's Medication Changes          These changes are accurate as of 7/13/18 11:07 AM.  If you have any questions, ask your nurse or doctor.               Start taking these medicines.        Dose/Directions    cyclobenzaprine 10 MG tablet   Commonly known as:  FLEXERIL   Used for:  Chronic left-sided low back pain with left-sided sciatica   Started by:  Kristen Uribe MD        Dose:  5-10 mg   Take 0.5-1 tablets (5-10 mg) by mouth 3 times daily as needed for muscle spasms   Quantity:  30 tablet   Refills:  1       " ibuprofen 800 MG tablet   Commonly known as:  ADVIL/MOTRIN   Used for:  Chronic left-sided low back pain with left-sided sciatica, Foraminal stenosis of lumbar region   Started by:  Kristen Uribe MD        Dose:  800 mg   Take 1 tablet (800 mg) by mouth every 8 hours as needed for moderate pain   Quantity:  30 tablet   Refills:  1       methylPREDNISolone 4 MG tablet   Commonly known as:  MEDROL DOSEPAK   Used for:  Chronic left-sided low back pain with left-sided sciatica   Started by:  Kristen Uribe MD        Follow package instructions   Quantity:  21 tablet   Refills:  0            Where to get your medicines      These medications were sent to [facility]/pharmacy #[id number] - [place], [place] - [address]  [address], [place] [address]     Phone:  [phone]     cyclobenzaprine 10 MG tablet    ibuprofen 800 MG tablet    methylPREDNISolone 4 MG tablet                Primary Care Provider Office Phone # Fax #    Kristen Uribe [phone] [address] [facility] [address]        Equal Access to Services     [facility] AH: Hadii ani ku hadasho Soomaali, waaxda luqadaha, qaybta kaalmada adeegyada, waxay colbyin haylynn gold . So [facility] [phone].    ATENCIÓN: Si habla español, tiene a coy disposición servicios gratuitos de asistencia lingüística. [facility] [phone].    We comply with applicable federal civil rights laws and [place] laws. We do not discriminate on the basis of race, color, national origin, age, disability, sex, sexual orientation, or gender identity.            Thank you!     Thank you for choosing [facility]  for your care. Our goal is always to provide you with excellent care. Hearing back from our patients is one way we can continue to improve our services. Please take a few minutes to complete the written survey that you may receive in the mail after your visit with us. Thank you!             Your Updated Medication List - Protect  others around you: Learn how to safely use, store and throw away your medicines at www.disposemymeds.org.          This list is accurate as of 7/13/18 11:07 AM.  Always use your most recent med list.                   Brand Name Dispense Instructions for use Diagnosis    atorvastatin 20 MG tablet    LIPITOR    90 tablet    TAKE 1 TABLET (20 MG) BY MOUTH DAILY    Hyperlipidemia LDL goal <130       azithromycin 250 MG tablet    ZITHROMAX    6 tablet    Two tablets first day, then one tablet daily for four days.    Acute non-recurrent sinusitis, unspecified location       cyclobenzaprine 10 MG tablet    FLEXERIL    30 tablet    Take 0.5-1 tablets (5-10 mg) by mouth 3 times daily as needed for muscle spasms    Chronic left-sided low back pain with left-sided sciatica       FLUoxetine 10 MG capsule    PROzac    30 capsule    TAKE 1 CAPSULE (10 MG) BY MOUTH DAILY    GABE (generalized anxiety disorder)       ibuprofen 800 MG tablet    ADVIL/MOTRIN    30 tablet    Take 1 tablet (800 mg) by mouth every 8 hours as needed for moderate pain    Chronic left-sided low back pain with left-sided sciatica, Foraminal stenosis of lumbar region       lisinopril-hydrochlorothiazide 20-12.5 MG per tablet    PRINZIDE/ZESTORETIC    90 tablet    Take 1 tablet by mouth daily due for office visit for further refills.    Hypertension goal BP (blood pressure) < 140/90       methylPREDNISolone 4 MG tablet    MEDROL DOSEPAK    21 tablet    Follow package instructions    Chronic left-sided low back pain with left-sided sciatica       mometasone 0.1 % cream    ELOCON    45 g    APPLY TO AFFECTED AREA DAILY AS DIRECTED    Eczema       order for DME     1 each    Light for SAD. 10,000 lux, to use daily for 15 minutes per day starting in October, increase to 30 minutes per day starting in November, December and January, decrease to 15 minutes daily in February through March.    GABE (generalized anxiety disorder)       tamsulosin 0.4 MG capsule     FLOMAX    30 capsule    TAKE 1 CAPSULE BY MOUTH EVERY DAY    Benign prostatic hyperplasia with urinary frequency

## 2018-10-07 DIAGNOSIS — F41.1 GAD (GENERALIZED ANXIETY DISORDER): ICD-10-CM

## 2018-10-08 RX ORDER — FLUOXETINE 10 MG/1
CAPSULE ORAL
Qty: 30 CAPSULE | Refills: 5 | Status: SHIPPED | OUTPATIENT
Start: 2018-10-08 | End: 2019-01-07

## 2019-01-04 NOTE — PROGRESS NOTES
SUBJECTIVE:   Kody Salas is a 58 year old male who presents to clinic today for the following health issues:    Depression and Anxiety Follow-Up    Status since last visit: Worsened     Other associated symptoms:SOB and super anxiety, can't talk or go to work     Complicating factors:     Significant life event: Yes-  Work stress and pressure      Current substance abuse: None    PHQ 2/7/2018   PHQ-9 Total Score 4   Q9: Suicide Ideation Not at all     GABE-7 SCORE 1/6/2016 3/28/2017 2/7/2018   Total Score - - -   Total Score - 0 (minimal anxiety) -   Total Score 6 - 5       PHQ-9  English  PHQ-9   Any Language  GABE-7  Suicide Assessment Five-step Evaluation and Treatment (SAFE-T)    Amount of exercise or physical activity: None    Problems taking medications regularly: No    Medication side effects: none    Diet: regular (no restrictions)    Patient presents with worsening anxiety triggered by work stress  Panic type symptoms as well    Over the last two weeks, how often have you been bothered by any the following symptoms?  Please use the following scale;  0 = Not at all  1 = Several days but less than half  2 = More than half of the days  3 = Nearly every day    1) Little interest or pleasure in doing things [  2  ]  2) Feeling down, depressed, or hopeless.[  2  ]  3) Trouble falling or staying asleep, or sleeping too much [  2  ]  4) Feeling tired or having little energy.[  2  ]  5) Poor appetite or overeating [  2  ]  6) Feeling bad about yourself - or that you are a failure or have let yourself or your family down [  1  ]  7) Trouble concentrating on things, such as reading the newspaper or watching television [  3  ]  8) Moving or speaking so slowly that other people could have noticed. Or the opposite-being fidgety or restless that you have been moving around a lot more than usual [  1  ]  9) Thoughts that you would be better off dead, or of hurting yourself in some way [  0  ]    Problem list and  "histories reviewed & adjusted, as indicated.  Additional history: as documented    BP Readings from Last 3 Encounters:   01/07/19 (!) 140/96   07/13/18 138/86   02/19/18 114/80    Wt Readings from Last 3 Encounters:   01/07/19 107.3 kg (236 lb 8 oz)   07/13/18 109.1 kg (240 lb 8 oz)   02/07/18 109.3 kg (241 lb)        Reviewed and updated as needed this visit by clinical staff  Tobacco  Allergies  Meds  Problems  Med Hx  Surg Hx  Fam Hx  Soc Hx        Reviewed and updated as needed this visit by Provider  Tobacco  Allergies  Meds  Problems  Med Hx  Surg Hx  Fam Hx         ROS:  Constitutional, HEENT, cardiovascular, pulmonary, gi and gu systems are negative, except as otherwise noted.    OBJECTIVE:     BP (!) 140/96   Pulse 116   Temp 98.5  F (36.9  C) (Oral)   Resp 20   Ht 1.899 m (6' 2.75\")   Wt 107.3 kg (236 lb 8 oz)   SpO2 97%   BMI 29.76 kg/m    Body mass index is 29.76 kg/m .  GENERAL: healthy, alert and no distress  EYES: Eyes grossly normal to inspection, PERRL and conjunctivae and sclerae normal  NECK: no adenopathy, no asymmetry, masses, or scars and thyroid normal to palpation  RESP: lungs clear to auscultation - no rales, rhonchi or wheezes  CV: regular rate and rhythm, normal S1 S2, no S3 or S4, no murmur, click or rub, no peripheral edema and peripheral pulses strong  SKIN: no suspicious lesions or rashes  PSYCH: mentation appears normal, affect normal/bright    ASSESSMENT/PLAN:     1. GABE (generalized anxiety disorder)  Likely related to recent work stress, will go up on prozac, trial or prn klonopin for about 3 months, will refer to therapist  - MENTAL HEALTH REFERRAL  - Adult; Outpatient Treatment; Individual/Couples/Family/Group Therapy/Health Psychology[place] [facility] [place] [facility] [phone]; We will contact you to schedule the appointment or please call with any questions  - FLUoxetine (PROZAC) 20 MG capsule; Take 1 capsule (20 mg) by mouth daily  Dispense: 30 " capsule; Refill: 2    2. Dysthymia    - MENTAL HEALTH REFERRAL  - Adult; Outpatient Treatment; Individual/Couples/Family/Group Therapy/Health Psychology; [facility] [place] [phone]; We will contact you to schedule the appointment or please call with any questions  - FLUoxetine (PROZAC) 20 MG capsule; Take 1 capsule (20 mg) by mouth daily  Dispense: 30 capsule; Refill: 2    3. Panic attacks    - clonazePAM (KLONOPIN) 0.5 MG tablet; Take 1 tablet (0.5 mg) by mouth 2 times daily as needed for anxiety  Dispense: 30 tablet; Refill: 0    Follow-up in 1 month    Isaiah Sánchez MD  [facility]

## 2019-01-07 ENCOUNTER — OFFICE VISIT (OUTPATIENT)
Dept: FAMILY MEDICINE | Facility: CLINIC | Age: 59
End: 2019-01-07
Payer: COMMERCIAL

## 2019-01-07 VITALS
HEART RATE: 116 BPM | DIASTOLIC BLOOD PRESSURE: 96 MMHG | BODY MASS INDEX: 29.4 KG/M2 | HEIGHT: 75 IN | SYSTOLIC BLOOD PRESSURE: 140 MMHG | TEMPERATURE: 98.5 F | OXYGEN SATURATION: 97 % | RESPIRATION RATE: 20 BRPM | WEIGHT: 236.5 LBS

## 2019-01-07 DIAGNOSIS — F41.0 PANIC ATTACKS: ICD-10-CM

## 2019-01-07 DIAGNOSIS — F34.1 DYSTHYMIA: ICD-10-CM

## 2019-01-07 DIAGNOSIS — F41.1 GAD (GENERALIZED ANXIETY DISORDER): Primary | ICD-10-CM

## 2019-01-07 PROCEDURE — 99214 OFFICE O/P EST MOD 30 MIN: CPT | Performed by: FAMILY MEDICINE

## 2019-01-07 RX ORDER — CLONAZEPAM 0.5 MG/1
0.5 TABLET ORAL 2 TIMES DAILY PRN
Qty: 30 TABLET | Refills: 0 | Status: SHIPPED | OUTPATIENT
Start: 2019-01-07 | End: 2019-02-07

## 2019-01-07 ASSESSMENT — MIFFLIN-ST. JEOR: SCORE: 1979.42

## 2019-01-07 ASSESSMENT — PAIN SCALES - GENERAL: PAINLEVEL: NO PAIN (0)

## 2019-01-23 ENCOUNTER — MYC REFILL (OUTPATIENT)
Dept: FAMILY MEDICINE | Facility: CLINIC | Age: 59
End: 2019-01-23

## 2019-01-23 DIAGNOSIS — L30.9 ECZEMA: ICD-10-CM

## 2019-01-24 RX ORDER — MOMETASONE FUROATE 1 MG/G
CREAM TOPICAL
Qty: 45 G | Refills: 1 | Status: SHIPPED | OUTPATIENT
Start: 2019-01-24 | End: 2019-06-21

## 2019-02-04 DIAGNOSIS — F41.0 PANIC ATTACKS: ICD-10-CM

## 2019-02-04 NOTE — TELEPHONE ENCOUNTER
Requested Prescriptions   Pending Prescriptions Disp Refills     clonazePAM (KLONOPIN) 0.5 MG tablet 30 tablet 0     Sig: Take 1 tablet (0.5 mg) by mouth 2 times daily as needed for anxiety    There is no refill protocol information for this order

## 2019-02-04 NOTE — TELEPHONE ENCOUNTER
[place] reviewed:  reviewed- no concerns    [place]  [email or web address]    Flakita Pinto RN - [place]

## 2019-02-06 ENCOUNTER — MYC REFILL (OUTPATIENT)
Dept: FAMILY MEDICINE | Facility: CLINIC | Age: 59
End: 2019-02-06

## 2019-02-06 DIAGNOSIS — F41.0 PANIC ATTACKS: ICD-10-CM

## 2019-02-06 RX ORDER — CLONAZEPAM 0.5 MG/1
0.5 TABLET ORAL 2 TIMES DAILY PRN
Qty: 30 TABLET | Refills: 0 | Status: CANCELLED | OUTPATIENT
Start: 2019-02-06

## 2019-02-06 NOTE — TELEPHONE ENCOUNTER
Controlled Substance Refill Request for clonazePAM (KLONOPIN) 0.5 MG tablet  Problem List Complete:  No     PROVIDER TO CONSIDER COMPLETION OF PROBLEM LIST AND OVERVIEW/CONTROLLED SUBSTANCE AGREEMENT    Last Written Prescription Date:  01/07/2019  Last Fill Quantity: 30,   # refills: 0    THE MOST RECENT OFFICE VISIT MUST BE WITHIN THE PAST 3 MONTHS. AT LEAST ONE FACE TO FACE VISIT MUST OCCUR EVERY 6 MONTHS. ADDITIONAL VISITS CAN BE VIRTUAL.  (THIS STATEMENT SHOULD BE DELETED.)    Last Office Visit with [facility] primary care provider: 01/07/2019    Future Office visit:     Controlled substance agreement:   Encounter-Level CSA:    There are no encounter-level csa.     Patient-Level CSA:    There are no patient-level csa.         Last Urine Drug Screen: No results found for: CDAUT, No results found for: COMDAT, No results found for: THC13, PCP13, COC13, MAMP13, OPI13, AMP13, BZO13, TCA13, MTD13, BAR13, OXY13, PPX13, BUP13     Processing:    [email or web address]       checked in past 3 months?  Yes 02/05/2019  Meera Jordan MA

## 2019-02-06 NOTE — TELEPHONE ENCOUNTER
Reason for Call:  Other call back    Detailed comments: Candida from [facility] pharmacy [place]  is calling for a refill on ClonzePam please fax that to [phone]. Thank you.     Phone Number Patient can be reached at: Other phone number:  [phone] / fax [phone]     Best Time: any    Can we leave a detailed message on this number? YES    Call taken on 2/6/2019 at 8:36 AM by Rajni Hanna

## 2019-02-07 RX ORDER — CLONAZEPAM 0.5 MG/1
0.5 TABLET ORAL 2 TIMES DAILY PRN
Qty: 30 TABLET | Refills: 0 | Status: SHIPPED | OUTPATIENT
Start: 2019-02-07 | End: 2019-05-24

## 2019-02-07 NOTE — TELEPHONE ENCOUNTER
Patient is checking status of refill request. States he is out of medication and needs refill today please. He was told by his therapist that he should not stop taking the medication. Please call.

## 2019-02-08 NOTE — TELEPHONE ENCOUNTER
Klonopin prescription signed by Dr. Sánchez and faxed to [facility] pharmacy at [phone].  Erika Luna,

## 2019-02-14 DIAGNOSIS — E78.5 HYPERLIPIDEMIA LDL GOAL <130: ICD-10-CM

## 2019-02-14 RX ORDER — ATORVASTATIN CALCIUM 20 MG/1
TABLET, FILM COATED ORAL
Qty: 90 TABLET | Refills: 3 | Status: SHIPPED | OUTPATIENT
Start: 2019-02-14 | End: 2020-01-13

## 2019-02-14 NOTE — TELEPHONE ENCOUNTER
"Requested Prescriptions   Pending Prescriptions Disp Refills     atorvastatin (LIPITOR) 20 MG tablet [Pharmacy Med Name: ATORVASTATIN 20 MG TABLET] 90 tablet 3     Sig: TAKE 1 TABLET BY MOUTH EVERY DAY    Statins Protocol Failed - 2/14/2019  7:36 AM       Failed - LDL on file in past 12 months    Recent Labs   Lab Test 02/07/18  0947   *            Passed - No abnormal creatine kinase in past 12 months    No lab results found.            Passed - Recent (12 mo) or future (30 days) visit within the authorizing provider's specialty    Patient had office visit in the last 12 months or has a visit in the next 30 days with authorizing provider or within the authorizing provider's specialty.  See \"Patient Info\" tab in inbasket, or \"Choose Columns\" in Meds & Orders section of the refill encounter.             Passed - Medication is active on med list       Passed - Patient is age 18 or older        Routing refill request to provider for review/approval because:  Labs out of range:  LDL  Labs not current:  LDL    Silke Addison RN          "

## 2019-03-01 ENCOUNTER — TELEPHONE (OUTPATIENT)
Dept: FAMILY MEDICINE | Facility: CLINIC | Age: 59
End: 2019-03-01

## 2019-03-01 NOTE — TELEPHONE ENCOUNTER
Panel Management Review      Patient has the following on his problem list:     Hypertension   Last three blood pressure readings:  BP Readings from Last 3 Encounters:   01/07/19 (!) 140/96   07/13/18 138/86   02/19/18 114/80     Blood pressure: MONITOR    HTN Guidelines:  Age 18-59 BP range:  Less than 140/90  Age 60-85 with Diabetes:  Less than 140/90  Age 60-85 without Diabetes:  less than 150/90      Composite cancer screening  Chart review shows that this patient is due/due soon for the following None  Summary:    Patient is due/failing the following:   BP CHECK, DAP and PHQ9    Action needed:   Patient needs office visit for anxiety follow up and bp follow up.    Type of outreach:    Sent vushaper message.    Questions for provider review:    None                                                                                                                                    LS     Chart routed to none .

## 2019-05-03 DIAGNOSIS — F41.1 GAD (GENERALIZED ANXIETY DISORDER): ICD-10-CM

## 2019-05-03 DIAGNOSIS — F34.1 DYSTHYMIA: ICD-10-CM

## 2019-05-04 ENCOUNTER — TELEPHONE (OUTPATIENT)
Dept: FAMILY MEDICINE | Facility: CLINIC | Age: 59
End: 2019-05-04

## 2019-05-04 DIAGNOSIS — I10 HYPERTENSION GOAL BP (BLOOD PRESSURE) < 140/90: ICD-10-CM

## 2019-05-06 RX ORDER — LISINOPRIL AND HYDROCHLOROTHIAZIDE 12.5; 2 MG/1; MG/1
TABLET ORAL
Qty: 30 TABLET | Refills: 0 | OUTPATIENT
Start: 2019-05-06

## 2019-05-06 NOTE — TELEPHONE ENCOUNTER
"Routing refill request to provider for review/approval because:  Labs out of range:  BP  Labs not current:  Cr, K, Na    Requested Prescriptions   Pending Prescriptions Disp Refills     lisinopril-hydrochlorothiazide (PRINZIDE/ZESTORETIC) 20-12.5 MG tablet [Pharmacy Med Name: LISINOPRIL-HCTZ 20-12.5 MG TAB] 30 tablet 0     Sig: TAKE 1 TABLET BY MOUTH EVERY DAY       Diuretics (Including Combos) Protocol Failed - 5/6/2019  8:05 AM        Failed - Blood pressure under 140/90 in past 12 months     BP Readings from Last 3 Encounters:   01/07/19 (!) 140/96   07/13/18 138/86   02/19/18 114/80                 Failed - Normal serum creatinine on file in past 12 months     Recent Labs   Lab Test 02/07/18  0947   CR 0.74              Failed - Normal serum potassium on file in past 12 months     Recent Labs   Lab Test 02/07/18  0947   POTASSIUM 3.7                    Failed - Normal serum sodium on file in past 12 months     Recent Labs   Lab Test 02/07/18  0947                 Passed - Recent (12 mo) or future (30 days) visit within the authorizing provider's specialty     Patient had office visit in the last 12 months or has a visit in the next 30 days with authorizing provider or within the authorizing provider's specialty.  See \"Patient Info\" tab in inbasket, or \"Choose Columns\" in Meds & Orders section of the refill encounter.              Passed - Medication is active on med list        Passed - Patient is age 18 or older        Casi Mixon RN  "

## 2019-05-06 NOTE — TELEPHONE ENCOUNTER
Called patient and left VM to call clinic to schedule med check appointment. Please help schedule appointment if patient returns call.  Katharina STOCK CMA ([facility])

## 2019-05-08 NOTE — TELEPHONE ENCOUNTER
Next 5 appointments (look out 90 days)    May 24, 2019  9:00 AM CDT  Office Visit with Isaiah Bledsoe MD  [facility] ([facility]) [address]  [place] [address]  [phone]       Billie Lewis,

## 2019-05-09 ENCOUNTER — TELEPHONE (OUTPATIENT)
Dept: FAMILY MEDICINE | Facility: CLINIC | Age: 59
End: 2019-05-09

## 2019-05-09 DIAGNOSIS — I10 HYPERTENSION GOAL BP (BLOOD PRESSURE) < 140/90: ICD-10-CM

## 2019-05-09 RX ORDER — LISINOPRIL AND HYDROCHLOROTHIAZIDE 12.5; 2 MG/1; MG/1
1 TABLET ORAL DAILY
Qty: 30 TABLET | Refills: 0 | Status: SHIPPED | OUTPATIENT
Start: 2019-05-09 | End: 2019-05-24

## 2019-05-09 NOTE — TELEPHONE ENCOUNTER
"Routing refill request to provider for review/approval because:  Labs not current:      Requested Prescriptions   Pending Prescriptions Disp Refills     lisinopril-hydrochlorothiazide (PRINZIDE/ZESTORETIC) 20-12.5 MG tablet [Pharmacy Med Name: LISINOPRIL-HCTZ 20-12.5 MG TAB]  Last Written Prescription Date:  4/5/19  Last Fill Quantity: 30,  # refills: 0   Last office visit: 1/7/2019 with prescribing provider:     Future Office Visit:   Next 5 appointments (look out 90 days)    May 24, 2019  9:00 AM CDT  [facility] [address] [id number] [phone]        30 tablet 0     Sig: TAKE 1 TABLET BY MOUTH EVERY DAY       Diuretics (Including Combos) Protocol Failed - 5/9/2019  8:09 AM        Failed - Blood pressure under 140/90 in past 12 months     BP Readings from Last 3 Encounters:   01/07/19 (!) 140/96   07/13/18 138/86   02/19/18 114/80                 Failed - Normal serum creatinine on file in past 12 months     Recent Labs   Lab Test 02/07/18  0947   CR 0.74              Failed - Normal serum potassium on file in past 12 months     Recent Labs   Lab Test 02/07/18  0947   POTASSIUM 3.7                    Failed - Normal serum sodium on file in past 12 months     Recent Labs   Lab Test 02/07/18  0947                 Passed - Recent (12 mo) or future (30 days) visit within the authorizing provider's specialty     Patient had office visit in the last 12 months or has a visit in the next 30 days with authorizing provider or within the authorizing provider's specialty.  See \"Patient Info\" tab in inbasket, or \"Choose Columns\" in Meds & Orders section of the refill encounter.              Passed - Medication is active on med list        Passed - Patient is age 18 or older        Flakita Pinto RN - [facility]      "

## 2019-05-09 NOTE — TELEPHONE ENCOUNTER
Reason for call:  Medication     If this is a refill request, has the caller requested the refill from the pharmacy already? Yes     Will the patient be using a [facility]? No     Name of the pharmacy and phone number for the current request: [facility] [phone]    Name of the medication requested: lisinopril-hydrochlorothiazide (PRINZIDE/ZESTORETIC) 20-12.5 MG tablet    Other request: Patient is out of the medication and needs it, he said he made an appt with [facility] as Dr. Uribe is out. Please call back     Phone number to reach patient:  Home number on file [phone] (home)    Best Time:  any    Can we leave a detailed message on this number?  YES

## 2019-05-09 NOTE — TELEPHONE ENCOUNTER
This was already sent today for 30 day supply  Please notify patient, advise to keep appointment for further refills    Martin Mark RN

## 2019-05-10 NOTE — TELEPHONE ENCOUNTER
Left patient VM to return call. Please inform patient or sent prescription  Kalen Jordan CMA on 5/10/2019 at 10:31 AM

## 2019-05-10 NOTE — TELEPHONE ENCOUNTER
Called and verified with pharmacy on duplicate prescription. Please disregard. BRITNEY Haile

## 2019-05-24 ENCOUNTER — OFFICE VISIT (OUTPATIENT)
Dept: FAMILY MEDICINE | Facility: CLINIC | Age: 59
End: 2019-05-24
Payer: COMMERCIAL

## 2019-05-24 VITALS
BODY MASS INDEX: 27.56 KG/M2 | RESPIRATION RATE: 16 BRPM | OXYGEN SATURATION: 98 % | SYSTOLIC BLOOD PRESSURE: 108 MMHG | DIASTOLIC BLOOD PRESSURE: 82 MMHG | TEMPERATURE: 99.7 F | HEART RATE: 96 BPM | WEIGHT: 219 LBS

## 2019-05-24 DIAGNOSIS — I10 BENIGN ESSENTIAL HYPERTENSION: ICD-10-CM

## 2019-05-24 DIAGNOSIS — F10.21 RECOVERING ALCOHOLIC IN REMISSION (H): Primary | ICD-10-CM

## 2019-05-24 PROCEDURE — 99214 OFFICE O/P EST MOD 30 MIN: CPT | Performed by: FAMILY MEDICINE

## 2019-05-24 RX ORDER — BACLOFEN 10 MG/1
TABLET ORAL
Refills: 0 | COMMUNITY
Start: 2019-03-07 | End: 2021-02-01

## 2019-05-24 RX ORDER — LISINOPRIL 10 MG/1
10 TABLET ORAL DAILY
Qty: 90 TABLET | Refills: 1 | Status: SHIPPED | OUTPATIENT
Start: 2019-05-24 | End: 2019-11-30

## 2019-05-24 ASSESSMENT — ANXIETY QUESTIONNAIRES
1. FEELING NERVOUS, ANXIOUS, OR ON EDGE: NOT AT ALL
GAD7 TOTAL SCORE: 0
IF YOU CHECKED OFF ANY PROBLEMS ON THIS QUESTIONNAIRE, HOW DIFFICULT HAVE THESE PROBLEMS MADE IT FOR YOU TO DO YOUR WORK, TAKE CARE OF THINGS AT HOME, OR GET ALONG WITH OTHER PEOPLE: NOT DIFFICULT AT ALL
3. WORRYING TOO MUCH ABOUT DIFFERENT THINGS: NOT AT ALL
5. BEING SO RESTLESS THAT IT IS HARD TO SIT STILL: NOT AT ALL
6. BECOMING EASILY ANNOYED OR IRRITABLE: NOT AT ALL
2. NOT BEING ABLE TO STOP OR CONTROL WORRYING: NOT AT ALL
7. FEELING AFRAID AS IF SOMETHING AWFUL MIGHT HAPPEN: NOT AT ALL

## 2019-05-24 ASSESSMENT — PATIENT HEALTH QUESTIONNAIRE - PHQ9
SUM OF ALL RESPONSES TO PHQ QUESTIONS 1-9: 0
5. POOR APPETITE OR OVEREATING: NOT AT ALL

## 2019-05-24 NOTE — PROGRESS NOTES
Subjective     Kody Salas is a 58 year old male who presents to clinic today for the following health issues:    HPI   Hypertension Follow-up      Do you check your blood pressure regularly outside of the clinic? No     Are you following a low salt diet? No    Are your blood pressures ever more than 140 on the top number (systolic) OR more   than 90 on the bottom number (diastolic), for example 140/90? Yes    Amount of exercise or physical activity: None    Problems taking medications regularly: No    Medication side effects: none    Diet: regular (no restrictions)    BP Readings from Last 3 Encounters:   05/24/19 108/82   01/07/19 (!) 140/96   07/13/18 138/86    Wt Readings from Last 3 Encounters:   05/24/19 99.3 kg (219 lb)   01/07/19 107.3 kg (236 lb 8 oz)   07/13/18 109.1 kg (240 lb 8 oz)         Patient presents for HTN visit.  Taking lisinopril/HCTZ daily, bp has been slightly low, no side effects, watching sodium intake, exercising, has stopped drinking.    Recovering alcoholic - patient stayed at [place] for about 45 days, he has not had a drink in >80 days, he's eating better, anxiety/depression has resolved.  He's taking prozac 20mg, going to AA meetings, no therapy at this time.    Reviewed and updated as needed this visit by Provider  Tobacco  Allergies  Meds  Problems  Med Hx  Surg Hx  Fam Hx         Review of Systems   ROS COMP: Constitutional, HEENT, cardiovascular, pulmonary, gi and gu systems are negative, except as otherwise noted.      Objective    /82   Pulse 96   Temp 99.7  F (37.6  C) (Oral)   Resp 16   Wt 99.3 kg (219 lb)   SpO2 98%   BMI 27.56 kg/m    Body mass index is 27.56 kg/m .  Physical Exam   GENERAL: healthy, alert and no distress  EYES: Eyes grossly normal to inspection, PERRL and conjunctivae and sclerae normal  NECK: no adenopathy, no asymmetry, masses, or scars and thyroid normal to palpation  RESP: lungs clear to auscultation - no rales, rhonchi or  wheezes  CV: regular rate and rhythm, normal S1 S2, no S3 or S4, no murmur, click or rub, no peripheral edema and peripheral pulses strong  SKIN: no suspicious lesions or rashes  PSYCH: mentation appears normal, affect normal/bright     Assessment & Plan       ICD-10-CM    1. 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 Benign essential hypertension I10 lisinopril (PRINIVIL/ZESTRIL) 10 MG tablet     Commended on improvement regarding alcohol use.  HTN - will give trial of low dose lisinopril and discontinue hydrochlorothiazide, monitor bp at home and notify clinic in a couple of weeks.  Continue weight loss.    Return in about 3 months (around 8/24/2019) for BP Recheck.    Isaiah Sánchez MD  [facility]

## 2019-05-25 ASSESSMENT — ANXIETY QUESTIONNAIRES: GAD7 TOTAL SCORE: 0

## 2019-06-04 DIAGNOSIS — F34.1 DYSTHYMIA: ICD-10-CM

## 2019-06-04 DIAGNOSIS — F41.1 GAD (GENERALIZED ANXIETY DISORDER): ICD-10-CM

## 2019-06-21 ENCOUNTER — MYC REFILL (OUTPATIENT)
Dept: FAMILY MEDICINE | Facility: CLINIC | Age: 59
End: 2019-06-21

## 2019-06-21 DIAGNOSIS — L30.9 ECZEMA: ICD-10-CM

## 2019-06-24 RX ORDER — MOMETASONE FUROATE 1 MG/G
CREAM TOPICAL
Qty: 45 G | Refills: 0 | Status: SHIPPED | OUTPATIENT
Start: 2019-06-24 | End: 2020-01-13

## 2019-08-12 ENCOUNTER — TRANSFERRED RECORDS (OUTPATIENT)
Dept: HEALTH INFORMATION MANAGEMENT | Facility: CLINIC | Age: 59
End: 2019-08-12

## 2019-08-12 LAB
CREAT SERPL-MCNC: 0.84 MG/DL (ref 0.72–1.25)
GFR SERPL CREATININE-BSD FRML MDRD: >60 ML/MIN/1.73M2
GLUCOSE SERPL-MCNC: 99 MG/DL (ref 65–100)
POTASSIUM SERPL-SCNC: 3.5 MMOL/L (ref 3.5–5)

## 2019-11-08 ENCOUNTER — HEALTH MAINTENANCE LETTER (OUTPATIENT)
Age: 59
End: 2019-11-08

## 2019-11-28 DIAGNOSIS — F34.1 DYSTHYMIA: ICD-10-CM

## 2019-11-28 DIAGNOSIS — F41.1 GAD (GENERALIZED ANXIETY DISORDER): ICD-10-CM

## 2019-11-29 DIAGNOSIS — I10 BENIGN ESSENTIAL HYPERTENSION: ICD-10-CM

## 2019-11-29 NOTE — TELEPHONE ENCOUNTER
Requested Prescriptions   Pending Prescriptions Disp Refills     lisinopril (PRINIVIL/ZESTRIL) 10 MG tablet 90 tablet 1     Sig: Take 1 tablet (10 mg) by mouth daily       There is no refill protocol information for this order        Last Written Prescription Date:  5/24/19  Last Fill Quantity: 90,  # refills: 1   Last office visit: 5/24/2019 with prescribing provider:  Kristen Uribe     Future Office Visit:

## 2019-11-30 RX ORDER — LISINOPRIL 10 MG/1
10 TABLET ORAL DAILY
Qty: 30 TABLET | Refills: 0 | Status: SHIPPED | OUTPATIENT
Start: 2019-11-30 | End: 2019-12-30

## 2019-11-30 NOTE — TELEPHONE ENCOUNTER
Medication is being filled for 1 time refill only due to:  Patient needs to be seen because needs annual exam.   Please call to schedule.  Eve Maxwell RN

## 2019-11-30 NOTE — TELEPHONE ENCOUNTER
Medication is being filled for 1 time refill only due to:  Patient needs to be seen because needs appt.   Please schedule

## 2020-01-02 DIAGNOSIS — I10 BENIGN ESSENTIAL HYPERTENSION: ICD-10-CM

## 2020-01-02 NOTE — TELEPHONE ENCOUNTER
Duplicate 1st request.    lisinopril (PRINIVIL/ZESTRIL) 10 MG tablet 30 tablet 0 12/30/2019  No   Sig - Route: Take 1 tablet (10 mg) by mouth daily NEEDS VISIT FOR FUTURE REFILLS. - Oral   Sent to pharmacy as: lisinopril (PRINIVIL/ZESTRIL) 10 MG tablet   Class: E-Prescribe   Notes to Pharmacy: NEEDS VISIT FOR FUTURE REFILLS.   Order: [id number]   E-Prescribing Status: Receipt confirmed by pharmacy (12/30/2019  9:29 AM CST)     Katharina STOCK CMA ([facility])

## 2020-01-03 RX ORDER — LISINOPRIL 10 MG/1
TABLET ORAL
Qty: 30 TABLET | Refills: 0 | Status: SHIPPED | OUTPATIENT
Start: 2020-01-03 | End: 2020-01-13

## 2020-01-13 ENCOUNTER — OFFICE VISIT (OUTPATIENT)
Dept: FAMILY MEDICINE | Facility: CLINIC | Age: 60
End: 2020-01-13
Payer: COMMERCIAL

## 2020-01-13 VITALS
BODY MASS INDEX: 27.59 KG/M2 | HEART RATE: 90 BPM | OXYGEN SATURATION: 98 % | DIASTOLIC BLOOD PRESSURE: 70 MMHG | HEIGHT: 74 IN | RESPIRATION RATE: 16 BRPM | SYSTOLIC BLOOD PRESSURE: 110 MMHG | WEIGHT: 215 LBS | TEMPERATURE: 97.9 F

## 2020-01-13 DIAGNOSIS — F34.1 DYSTHYMIA: ICD-10-CM

## 2020-01-13 DIAGNOSIS — Z87.891 PERSONAL HISTORY OF TOBACCO USE: ICD-10-CM

## 2020-01-13 DIAGNOSIS — I10 BENIGN ESSENTIAL HYPERTENSION: ICD-10-CM

## 2020-01-13 DIAGNOSIS — F41.1 GAD (GENERALIZED ANXIETY DISORDER): ICD-10-CM

## 2020-01-13 DIAGNOSIS — F10.11 ALCOHOL ABUSE, IN REMISSION: ICD-10-CM

## 2020-01-13 DIAGNOSIS — Z00.00 ROUTINE GENERAL MEDICAL EXAMINATION AT A HEALTH CARE FACILITY: Primary | ICD-10-CM

## 2020-01-13 DIAGNOSIS — E78.5 HYPERLIPIDEMIA LDL GOAL <130: ICD-10-CM

## 2020-01-13 DIAGNOSIS — L30.9 ECZEMA, UNSPECIFIED TYPE: ICD-10-CM

## 2020-01-13 LAB
ALBUMIN SERPL-MCNC: 4.1 G/DL (ref 3.4–5)
ALP SERPL-CCNC: 78 U/L (ref 40–150)
ALT SERPL W P-5'-P-CCNC: 29 U/L (ref 0–70)
ANION GAP SERPL CALCULATED.3IONS-SCNC: 4 MMOL/L (ref 3–14)
AST SERPL W P-5'-P-CCNC: 19 U/L (ref 0–45)
BILIRUB SERPL-MCNC: 0.5 MG/DL (ref 0.2–1.3)
BUN SERPL-MCNC: 13 MG/DL (ref 7–30)
CALCIUM SERPL-MCNC: 9.1 MG/DL (ref 8.5–10.1)
CHLORIDE SERPL-SCNC: 108 MMOL/L (ref 94–109)
CHOLEST SERPL-MCNC: 166 MG/DL
CO2 SERPL-SCNC: 29 MMOL/L (ref 20–32)
CREAT SERPL-MCNC: 0.71 MG/DL (ref 0.66–1.25)
GFR SERPL CREATININE-BSD FRML MDRD: >90 ML/MIN/{1.73_M2}
GLUCOSE SERPL-MCNC: 106 MG/DL (ref 70–99)
HDLC SERPL-MCNC: 46 MG/DL
LDLC SERPL CALC-MCNC: 96 MG/DL
NONHDLC SERPL-MCNC: 120 MG/DL
POTASSIUM SERPL-SCNC: 4.5 MMOL/L (ref 3.4–5.3)
PROT SERPL-MCNC: 7 G/DL (ref 6.8–8.8)
SODIUM SERPL-SCNC: 141 MMOL/L (ref 133–144)
TRIGL SERPL-MCNC: 121 MG/DL

## 2020-01-13 PROCEDURE — 80053 COMPREHEN METABOLIC PANEL: CPT | Performed by: FAMILY MEDICINE

## 2020-01-13 PROCEDURE — G0296 VISIT TO DETERM LDCT ELIG: HCPCS | Performed by: FAMILY MEDICINE

## 2020-01-13 PROCEDURE — 36415 COLL VENOUS BLD VENIPUNCTURE: CPT | Performed by: FAMILY MEDICINE

## 2020-01-13 PROCEDURE — 80061 LIPID PANEL: CPT | Performed by: FAMILY MEDICINE

## 2020-01-13 PROCEDURE — 99396 PREV VISIT EST AGE 40-64: CPT | Mod: 25 | Performed by: FAMILY MEDICINE

## 2020-01-13 RX ORDER — ATORVASTATIN CALCIUM 20 MG/1
TABLET, FILM COATED ORAL
Qty: 90 TABLET | Refills: 3 | Status: SHIPPED | OUTPATIENT
Start: 2020-01-13 | End: 2021-02-01

## 2020-01-13 RX ORDER — LISINOPRIL 5 MG/1
5 TABLET ORAL DAILY
Qty: 30 TABLET | Refills: 1 | Status: SHIPPED | OUTPATIENT
Start: 2020-01-13 | End: 2020-02-07

## 2020-01-13 RX ORDER — LISINOPRIL 10 MG/1
10 TABLET ORAL DAILY
Qty: 30 TABLET | Refills: 0 | Status: CANCELLED | OUTPATIENT
Start: 2020-01-13

## 2020-01-13 RX ORDER — MOMETASONE FUROATE 1 MG/G
CREAM TOPICAL
Qty: 45 G | Refills: 0 | Status: SHIPPED | OUTPATIENT
Start: 2020-01-13 | End: 2020-08-17

## 2020-01-13 ASSESSMENT — ANXIETY QUESTIONNAIRES
2. NOT BEING ABLE TO STOP OR CONTROL WORRYING: NOT AT ALL
6. BECOMING EASILY ANNOYED OR IRRITABLE: NOT AT ALL
1. FEELING NERVOUS, ANXIOUS, OR ON EDGE: NOT AT ALL
GAD7 TOTAL SCORE: 0
IF YOU CHECKED OFF ANY PROBLEMS ON THIS QUESTIONNAIRE, HOW DIFFICULT HAVE THESE PROBLEMS MADE IT FOR YOU TO DO YOUR WORK, TAKE CARE OF THINGS AT HOME, OR GET ALONG WITH OTHER PEOPLE: NOT DIFFICULT AT ALL
5. BEING SO RESTLESS THAT IT IS HARD TO SIT STILL: NOT AT ALL
7. FEELING AFRAID AS IF SOMETHING AWFUL MIGHT HAPPEN: NOT AT ALL
3. WORRYING TOO MUCH ABOUT DIFFERENT THINGS: NOT AT ALL

## 2020-01-13 ASSESSMENT — MIFFLIN-ST. JEOR: SCORE: 1864.98

## 2020-01-13 ASSESSMENT — PATIENT HEALTH QUESTIONNAIRE - PHQ9
5. POOR APPETITE OR OVEREATING: NOT AT ALL
SUM OF ALL RESPONSES TO PHQ QUESTIONS 1-9: 0

## 2020-01-13 NOTE — PROGRESS NOTES
Lung Cancer Screening Shared Decision Making Visit     Kody Salas is eligible for lung cancer screening on the basis of the information provided in my signed lung cancer screening order.     I have discussed with patient the risks and benefits of screening for lung cancer with low-dose CT.     The risks include:  radiation exposure: one low dose chest CT has as much ionizing radiation as about 15 chest x-rays or 6 months of background radiation living in [place]    false positives: 96% of positive findings/nodules are NOT cancer, but some might still require additional diagnostic evaluation, including biopsy  over-diagnosis: some slow growing cancers that might never have been clinically significant will be detected and treated unnecessarily     The benefit of early detection of lung cancer is contingent upon adherence to annual screening or more frequent follow up if indicated.     Furthermore, reaping the benefits of screening requires Kody Salas to be willing and physically able to undergo diagnostic procedures, if indicated. Although no specific guide is available for determining severity of comorbidities, it is reasonable to withhold screening in patients who have greater mortality risk from other diseases.     We did discuss that the only way to prevent lung cancer is to not smoke. Smoking cessation assistance was offered.

## 2020-01-13 NOTE — PATIENT INSTRUCTIONS
Preventive Health Recommendations  Male Ages 50 - 64    Yearly exam:             See your health care provider every year in order to  o   Review health changes.   o   Discuss preventive care.    o   Review your medicines if your doctor has prescribed any.     Have a cholesterol test every 5 years, or more frequently if you are at risk for high cholesterol/heart disease.     Have a diabetes test (fasting glucose) every three years. If you are at risk for diabetes, you should have this test more often.     Have a colonoscopy at age 50, or have a yearly FIT test (stool test). These exams will check for colon cancer.      Talk with your health care provider about whether or not a prostate cancer screening test (PSA) is right for you.    You should be tested each year for STDs (sexually transmitted diseases), if you re at risk.     Shots: Get a flu shot each year. Get a tetanus shot every 10 years.     Nutrition:    Eat at least 5 servings of fruits and vegetables daily.     Eat whole-grain bread, whole-wheat pasta and brown rice instead of white grains and rice.     Get adequate Calcium and Vitamin D.     Lifestyle    Exercise for at least 150 minutes a week (30 minutes a day, 5 days a week). This will help you control your weight and prevent disease.     Limit alcohol to one drink per day.     No smoking.     Wear sunscreen to prevent skin cancer.     See your dentist every six months for an exam and cleaning.     See your eye doctor every 1 to 2 years.    Lung Cancer Screening   Frequently Asked Questions  If you are at high-risk for lung cancer, getting screened with low-dose computed tomography (LDCT) every year can help save your life. This handout offers answers to some of the most common questions about lung cancer screening. If you have other questions, please call [phone] ([phone]).     What is it?  Lung cancer screening uses special X-ray technology to create an image of your lung tissue.  The exam is quick and easy and takes less than 10 seconds. We don t give you any medicine or use any needles. You can eat before and after the exam. You don t need to change your clothes as long as the clothing on your chest doesn t contain metal. But, you do need to be able to hold your breath for at least 6 seconds during the exam.    What is the goal of lung cancer screening?  The goal of lung cancer screening is to save lives. Many times, lung cancer is not found until a person starts having physical symptoms. Lung cancer screening can help detect lung cancer in the earliest stages when it may be easier to treat.    Who should be screened for lung cancer?  We suggest lung cancer screening for anyone who is at high-risk for lung cancer. You are in the high-risk group if you:      are between the ages of 55 and 79, and    have smoked at least 1 pack of cigarettes a day for 30 or more years, and    still smoke or have quit within the past 15 years.    However, if you have a new cough or shortness of breath, you should talk to your doctor before being screened.    Some national lung health advocacy groups also recommend screening for people ages 50 to 79 who have smoked an average of 1 pack of cigarettes a day for 20 years. They must also have at least 1 other risk factor for lung cancer, not including exposure to secondhand smoke. Other risk factors are having had cancer in the past, emphysema, pulmonary fibrosis, COPD, a family history of lung cancer, or exposure to certain materials such as arsenic, asbestos, beryllium, cadmium, chromium, diesel fumes, nickel, radon or silica. Your care team can help you know if you have one of these risk factors.     Why does it matter if I have symptoms?  Certain symptoms can be a sign that you have a condition in your lungs that should be checked and treated by your doctor. These symptoms include fever, chest pain, a new or changing cough, shortness of breath that you have  never felt before, coughing up blood or unexplained weight loss. Having any of these symptoms can greatly affect the results of lung cancer screening.       Should all smokers get an LDCT lung cancer screening exam?  It depends. Lung cancer screening is for a very specific group of men and women who have a history of heavy smoking over a long period of time (see  Who should be screened for lung cancer  above).  I am in the high-risk group, but have been diagnosed with cancer in the past. Is LDCT lung cancer screening right for me?  In some cases, you should not have LDCT lung screening, such as when your doctor is already following your cancer with CT scan studies. Your doctor will help you decide if LDCT lung screening is right for you.  Do I need to have a screening exam every year?  Yes. If you are in the high-risk group described earlier, you should get an LDCT lung cancer screening exam every year until you are 79, or are no longer willing or able to undergo screening and possible procedures to diagnose and treat lung cancer.  How effective is LDCT at preventing death from lung cancer?  Studies have shown that LDCT lung cancer screening can lower the risk of death from lung cancer by 20 percent in people who are at high-risk.  What are the risks?  There are some risks and limitations of LDCT lung cancer screening. We want to make sure you understand the risks and benefits, so please let us know if you have any questions. Your doctor may want to talk with you more about these risks.    Radiation exposure: As with any exam that uses radiation, there is a very small increased risk of cancer. The amount of radiation in LDCT is small--about the same amount a person would get from a mammogram. Your doctor orders the exam when he or she feels the potential benefits outweigh the risks.    False negatives: No test is perfect, including LDCT. It is possible that you may have a medical condition, including lung cancer,  that is not found during your exam. This is called a false negative result.    False positives and more testing: LDCT very often finds something in the lung that could be cancer, but in fact is not. This is called a false positive result. False positive tests often cause anxiety. To make sure these findings are not cancer, you may need to have more tests. These tests will be done only if you give us permission. Sometimes patients need a treatment that can have side effects, such as a biopsy. For more information on false positives, see  What can I expect from the results?     Findings not related to lung cancer: Your LDCT exam also takes pictures of areas of your body next to your lungs. In a very small number of cases, the CT scan will show an abnormal finding in one of these areas, such as your kidneys, adrenal glands, liver or thyroid. This finding may not be serious, but you may need more tests. Your doctor can help you decide what other tests you may need, if any.  What can I expect from the results?  About 1 out of 4 LDCT exams will find something that may need more tests. Most of the time, these findings are lung nodules. Lung nodules are very small collections of tissue in the lung. These nodules are very common, and the vast majority--more than 97 percent--are not cancer (benign). Most are normal lymph nodes or small areas of scarring from past infections.  But, if a small lung nodule is found to be cancer, the cancer can be cured more than 90 percent of the time. To know if the nodule is cancer, we may need to get more images before your next yearly screening exam. If the nodule has suspicious features (for example, it is large, has an odd shape or grows over time), we will refer you to a specialist for further testing.  Will my doctor also get the results?  Yes. Your doctor will get a copy of your results.  Is it okay to keep smoking now that there s a cancer screening exam?  No. Tobacco is one of the  strongest cancer-causing agents. It causes not only lung cancer, but other cancers and cardiovascular (heart) diseases as well. The damage caused by smoking builds over time. This means that the longer you smoke, the higher your risk of disease. While it is never too late to quit, the sooner you quit, the better.  Where can I find help to quit smoking?  The best way to prevent lung cancer is to stop smoking. If you have already quit smoking, congratulations and keep it up! For help on quitting smoking, please call [facility] at 3-393-403-KNIK ([phone]) or the American [employer] Society at 1-924.690.8070 to find local resources near you.  One-on-one health coaching:  If you d prefer to work individually with a health care provider on tobacco cessation, we offer:      Medication Therapy Management:  Our specially trained pharmacists work closely with you and your doctor to help you quit smoking.  Call [phone] or [phone] (toll free).     Can Do: Health coaching offered by [facility].  [email or web address]

## 2020-01-13 NOTE — LETTER
[facility]  [address] [place] [address]    January 13, 2020    Kody Salas  [address] [address] [place] [id number]          Dear Kody,    Your cholesterol results are normal.   Your Electrolytes,Kidney test and Liver test are normal .   Your cholesterol results are normal.     Enclosed is a copy of your results.     Results for orders placed or performed in visit on 01/13/20   Lipid panel reflex to direct LDL Fasting     Status: None   Result Value Ref Range    Cholesterol 166 <200 mg/dL    Triglycerides 121 <150 mg/dL    HDL Cholesterol 46 >39 mg/dL    LDL Cholesterol Calculated 96 <100 mg/dL    Non HDL Cholesterol 120 <130 mg/dL   Comprehensive metabolic panel     Status: Abnormal   Result Value Ref Range    Sodium 141 133 - 144 mmol/L    Potassium 4.5 3.4 - 5.3 mmol/L    Chloride 108 94 - 109 mmol/L    Carbon Dioxide 29 20 - 32 mmol/L    Anion Gap 4 3 - 14 mmol/L    Glucose 106 (H) 70 - 99 mg/dL    Urea Nitrogen 13 7 - 30 mg/dL    Creatinine 0.71 0.66 - 1.25 mg/dL    GFR Estimate >90 >60 mL/min/[1.73_m2]    GFR Estimate If Black >90 >60 mL/min/[1.73_m2]    Calcium 9.1 8.5 - 10.1 mg/dL    Bilirubin Total 0.5 0.2 - 1.3 mg/dL    Albumin 4.1 3.4 - 5.0 g/dL    Protein Total 7.0 6.8 - 8.8 g/dL    Alkaline Phosphatase 78 40 - 150 U/L    ALT 29 0 - 70 U/L    AST 19 0 - 45 U/L       If you have any questions or concerns, please call myself or my nurse at [phone].      Sincerely,        Kristen Uribe MD/maik

## 2020-01-13 NOTE — PROGRESS NOTES
3  SUBJECTIVE:   CC: Kody Salas is an 59 year old male who presents for preventive health visit.     Healthy Habits:  Do you get at least three servings of calcium containing foods daily (dairy, green leafy vegetables, etc.)? yes  Amount of exercise or daily activities, outside of work: 4 day(s) per week  Problems taking medications regularly No  Medication side effects: No  Have you had an eye exam in the past two years? yes  Do you see a dentist twice per year? yes  Do you have sleep apnea, excessive snoring or daytime drowsiness?no        Today's PHQ-2 Score:   PHQ-2 (  [employer]) 2018   Q1: Little interest or pleasure in doing things 2 1   Q2: Feeling down, depressed or hopeless 0 0   PHQ-2 Score 2 1   Q1: Little interest or pleasure in doing things Several days -   Q2: Feeling down, depressed or hopeless Not at all -   PHQ-2 Score 1 -       Abuse: Current or Past(Physical, Sexual or Emotional)- No  Do you feel safe in your environment? Yes    Have you ever done Advance Care Planning? (For example, a Health Directive, POLST, or a discussion with a medical provider or your loved ones about your wishes): No, advance care planning information given to patient to review.  Patient plans to discuss their wishes with loved ones or provider.      Social History     Tobacco Use     Smoking status: Former Smoker     Packs/day: 1.00     Years: 35.00     Pack years: 35.00     Last attempt to quit: 6/15/2012     Years since quittin.5     Smokeless tobacco: Never Used   Substance Use Topics     Alcohol use: No     Comment: quit 2015     If you drink alcohol do you typically have >3 drinks per day or >7 drinks per week? No                      Last PSA:   PSA   Date Value Ref Range Status   2018 0.61 0 - 4 ug/L Final     Comment:     Assay Method:  Chemiluminescence using Siemens Vista analyzer       Reviewed orders with patient. Reviewed health maintenance and updated orders  accordingly - Yes  Lab work is in process  Labs reviewed in EPIC  BP Readings from Last 3 Encounters:   20 110/70   19 108/82   19 (!) 140/96    Wt Readings from Last 3 Encounters:   20 97.5 kg (215 lb)   19 99.3 kg (219 lb)   19 107.3 kg (236 lb 8 oz)                  Patient Active Problem List   Diagnosis     Hyperlipidemia LDL goal <130     Seasonal allergies     Hypertension goal BP (blood pressure) < 140/90     GABE (generalized anxiety disorder)     GERD (gastroesophageal reflux disease)     Eczema     Obesity     History of ETOH abuse     Chronic back pain     Impaired glucose tolerance     Anxiety     Malignant melanoma of left upper extremity including shoulder (H)     Alcohol abuse, in remission     Past Surgical History:   Procedure Laterality Date     COLONOSCOPY WITH CO2 INSUFFLATION N/A 2018    Procedure: COLONOSCOPY WITH CO2 INSUFFLATION;  COLON-SCREENING / ASHER ;  Surgeon: Brandon Ruby MD;  Location: [facility] OR     HC KNEE SCOPE,MED/LAT MENISCUS REPAIR      right     PROPHYLAXIS RETINA DETACH,CRYO/DIATH  2009       Social History     Tobacco Use     Smoking status: Former Smoker     Packs/day: 1.00     Years: 35.00     Pack years: 35.00     Last attempt to quit: 6/15/2012     Years since quittin.5     Smokeless tobacco: Never Used   Substance Use Topics     Alcohol use: No     Comment: quit 2015     Family History   Problem Relation Age of Onset     Hypertension Mother      Hypertension Father      Hypertension Brother      Hypertension Maternal Grandmother      Hypertension Maternal Grandfather      Hypertension Paternal Grandmother      Hypertension Paternal Grandfather      Heart Disease Paternal Grandfather      Anesthesia Reaction No family hx of          Current Outpatient Medications   Medication Sig Dispense Refill     atorvastatin (LIPITOR) 20 MG tablet TAKE 1 TABLET BY MOUTH EVERY DAY 90 tablet 3     FLUoxetine (PROZAC) 20  MG capsule Take 1 capsule (20 mg) by mouth daily 30 capsule 6     lisinopril (PRINIVIL/ZESTRIL) 5 MG tablet Take 1 tablet (5 mg) by mouth daily 30 tablet 1     mometasone (ELOCON) 0.1 % external cream Apply to affected area daily as directed 45 g 0     omeprazole (PRILOSEC) 20 MG DR capsule Take 1 Capsule BY MOUTH EVERY DAY  0     baclofen (LIORESAL) 10 MG tablet Take 1 Tablet BY MOUTH THREE TIMES DAILY AS NEEDED FOR muscle pain  0     No Known Allergies  Recent Labs   Lab Test 08/12/19 02/07/18  0947 02/03/17  0928 01/06/16  0837   A1C  --  5.5  --   --    LDL  --  126* 135* 126*   HDL  --  41 66 37*   TRIG  --  95 114 130   ALT  --  38 50 26   CR 0.84 0.74 0.66 0.90   GFRESTIMATED >60 >90 >90  Non  GFR Calc   87   GFRESTBLACK >60 >90 >90   GFR Calc   >90   GFR Calc     POTASSIUM 3.5 3.7 4.0 4.7   TSH  --   --  3.17  --         Reviewed and updated as needed this visit by clinical staff  Tobacco  Allergies  Meds  Med Hx  Surg Hx  Fam Hx  Soc Hx        Reviewed and updated as needed this visit by Provider        Past Medical History:   Diagnosis Date     Arthritis      High cholesterol      Hypertension      Malignant melanoma of left upper extremity including shoulder (H) 7/13/2018      Past Surgical History:   Procedure Laterality Date     COLONOSCOPY WITH CO2 INSUFFLATION N/A 2/19/2018    Procedure: COLONOSCOPY WITH CO2 INSUFFLATION;  COLON-SCREENING / ASHER ;  Surgeon: Brandon Ruby MD;  Location: [facility]      KNEE SCOPE,MED/LAT MENISCUS REPAIR  2003    right     PROPHYLAXIS RETINA DETACH,CRYO/DIATH  12/2009       ROS:  CONSTITUTIONAL: NEGATIVE for fever, chills, change in weight  INTEGUMENTARY/SKIN: NEGATIVE for worrisome rashes, moles or lesions  EYES: NEGATIVE for vision changes or irritation  ENT: NEGATIVE for ear, mouth and throat problems  RESP: NEGATIVE for significant cough or SOB  CV: NEGATIVE for chest pain, palpitations or peripheral  "edema  GI: NEGATIVE for nausea, abdominal pain, heartburn, or change in bowel habits   male: negative for dysuria, hematuria, decreased urinary stream, erectile dysfunction, urethral discharge  MUSCULOSKELETAL: NEGATIVE for significant arthralgias or myalgia  NEURO: NEGATIVE for weakness, dizziness or paresthesias  ENDOCRINE: NEGATIVE for temperature intolerance, skin/hair changes  PSYCHIATRIC: NEGATIVE for changes in mood or affect    OBJECTIVE:   /70   Pulse 90   Temp 97.9  F (36.6  C) (Oral)   Resp 16   Ht 1.88 m (6' 2\")   Wt 97.5 kg (215 lb)   SpO2 98%   BMI 27.60 kg/m    EXAM:  GENERAL: healthy, alert and no distress  EYES: Eyes grossly normal to inspection, PERRL and conjunctivae and sclerae normal  NECK: no adenopathy, no asymmetry, masses, or scars and thyroid normal to palpation  RESP: lungs clear to auscultation - no rales, rhonchi or wheezes  CV: regular rate and rhythm, normal S1 S2, no S3 or S4, no murmur, click or rub, no peripheral edema and peripheral pulses strong  ABDOMEN: soft, nontender, no hepatosplenomegaly, no masses and bowel sounds normal  MS: no gross musculoskeletal defects noted, no edema  PSYCH: mentation appears normal    Diagnostic Test Results:  Labs reviewed in Epic  Pending     ASSESSMENT/PLAN:   1. Routine general medical examination at a health care facility      2. Hyperlipidemia LDL goal <130  Labs pending   - atorvastatin (LIPITOR) 20 MG tablet; TAKE 1 TABLET BY MOUTH EVERY DAY  Dispense: 90 tablet; Refill: 3  - Lipid panel reflex to direct LDL Fasting  - Comprehensive metabolic panel    3. Benign essential hypertension  Stable   - lisinopril (PRINIVIL/ZESTRIL) 5 MG tablet; Take 1 tablet (5 mg) by mouth daily  Dispense: 30 tablet; Refill: 1  - Comprehensive metabolic panel    4. Alcohol abuse, in remission   Sober 6 months -Pt went for counseling    5. GABE (generalized anxiety disorder)  Stable   - FLUoxetine (PROZAC) 20 MG capsule; Take 1 capsule (20 mg) by " "mouth daily  Dispense: 30 capsule; Refill: 6    6. Dysthymia  Stable   - FLUoxetine (PROZAC) 20 MG capsule; Take 1 capsule (20 mg) by mouth daily  Dispense: 30 capsule; Refill: 6    7. Eczema, unspecified type  refilled  - mometasone (ELOCON) 0.1 % external cream; Apply to affected area daily as directed  Dispense: 45 g; Refill: 0    8. Personal history of tobacco use  Advised   - Prof Fee: Shared Decision Making Visit for Lung Cancer Screening  - CT Chest Lung Cancer Scrn Low Dose wo; Future    COUNSELING:  Reviewed preventive health counseling, as reflected in patient instructions       Regular exercise       Healthy diet/nutrition       Vision screening       Immunizations  Declined: Influenza due to Conscientious objector               Alcohol Use       Consider Hep C screening for patients born between 1945 and 1965       Colon cancer screening       Prostate cancer screening       Consider lung cancer screening for ages 55-80 years and 30 pack-year smoking history         The 10-year ASCVD risk score (Nakul MENENDEZ Jr., et al., 2013) is: 7.1%    Values used to calculate the score:      Age: 59 years      Sex: Male      Is Non- : No      Diabetic: No      Tobacco smoker: No      Systolic Blood Pressure: 110 mmHg      Is BP treated: Yes      HDL Cholesterol: 41 mg/dL      Total Cholesterol: 186 mg/dL    Estimated body mass index is 27.6 kg/m  as calculated from the following:    Height as of this encounter: 1.88 m (6' 2\").    Weight as of this encounter: 97.5 kg (215 lb).    Weight management plan: Discussed healthy diet and exercise guidelines     reports that he quit smoking about 7 years ago. He has a 35.00 pack-year smoking history. He has never used smokeless tobacco.      Counseling Resources:  ATP IV Guidelines  Pooled Cohorts Equation Calculator  FRAX Risk Assessment  ICSI Preventive Guidelines  Dietary Guidelines for Americans, 2010  USDA's MyPlate  ASA Prophylaxis  Lung CA " Screening    Kristen Uribe MD  [facility] [place]

## 2020-01-14 ASSESSMENT — ANXIETY QUESTIONNAIRES: GAD7 TOTAL SCORE: 0

## 2020-01-28 DIAGNOSIS — I10 BENIGN ESSENTIAL HYPERTENSION: ICD-10-CM

## 2020-02-03 RX ORDER — LISINOPRIL 10 MG/1
10 TABLET ORAL DAILY
Qty: 30 TABLET | Refills: 0 | Status: SHIPPED | OUTPATIENT
Start: 2020-02-03 | End: 2021-11-09 | Stop reason: ALTCHOICE

## 2020-02-06 DIAGNOSIS — I10 BENIGN ESSENTIAL HYPERTENSION: ICD-10-CM

## 2020-02-06 NOTE — TELEPHONE ENCOUNTER
duplicate     Disp Refills Start End [facility]   lisinopril (PRINIVIL/ZESTRIL) 5 MG tablet 30 tablet 1 1/13/2020  --   Sig - Route: Take 1 tablet (5 mg) by mouth daily - Oral   Sent to pharmacy as: lisinopril (PRINIVIL/ZESTRIL) 5 MG tablet   Class: E-Prescribe   Order: [id number]   E-Prescribing Status: Receipt confirmed by pharmacy (1/13/2020  7:56 AM CST)

## 2020-02-07 RX ORDER — LISINOPRIL 5 MG/1
TABLET ORAL
Qty: 90 TABLET | Refills: 2 | Status: SHIPPED | OUTPATIENT
Start: 2020-02-07 | End: 2020-11-11

## 2020-02-24 NOTE — TELEPHONE ENCOUNTER
Please call patient. Did he request this medication? Does have baclofen listed on MAR as reported/historical.    Casi Middleton RN

## 2020-02-24 NOTE — TELEPHONE ENCOUNTER
Called and left patient VM to return call to red team  Kalen Jordan CMA on 2/24/2020 at 11:25 AM

## 2020-02-24 NOTE — TELEPHONE ENCOUNTER
flexeril      Last Written Prescription Date:    Last Fill Quantity: ,   # refills:   Last Office Visit:   Future Office visit:       Routing refill request to provider for review/approval because:  Drug not active on patient's medication list

## 2020-08-17 DIAGNOSIS — F34.1 DYSTHYMIA: ICD-10-CM

## 2020-08-17 DIAGNOSIS — L30.9 ECZEMA, UNSPECIFIED TYPE: ICD-10-CM

## 2020-08-17 DIAGNOSIS — F41.1 GAD (GENERALIZED ANXIETY DISORDER): ICD-10-CM

## 2020-08-17 RX ORDER — MOMETASONE FUROATE 1 MG/G
CREAM TOPICAL
Qty: 45 G | Refills: 1 | Status: SHIPPED | OUTPATIENT
Start: 2020-08-17 | End: 2021-07-19

## 2020-11-10 DIAGNOSIS — I10 BENIGN ESSENTIAL HYPERTENSION: ICD-10-CM

## 2020-11-11 ENCOUNTER — OFFICE VISIT (OUTPATIENT)
Dept: ORTHOPEDICS | Facility: CLINIC | Age: 60
End: 2020-11-11
Payer: COMMERCIAL

## 2020-11-11 ENCOUNTER — ANCILLARY PROCEDURE (OUTPATIENT)
Dept: GENERAL RADIOLOGY | Facility: CLINIC | Age: 60
End: 2020-11-11
Attending: FAMILY MEDICINE
Payer: COMMERCIAL

## 2020-11-11 VITALS
BODY MASS INDEX: 27.21 KG/M2 | HEIGHT: 74 IN | WEIGHT: 212 LBS | DIASTOLIC BLOOD PRESSURE: 69 MMHG | SYSTOLIC BLOOD PRESSURE: 130 MMHG

## 2020-11-11 DIAGNOSIS — M25.512 ACUTE PAIN OF LEFT SHOULDER: ICD-10-CM

## 2020-11-11 DIAGNOSIS — M25.512 ACUTE PAIN OF LEFT SHOULDER: Primary | ICD-10-CM

## 2020-11-11 DIAGNOSIS — M77.12 LATERAL EPICONDYLITIS OF LEFT ELBOW: ICD-10-CM

## 2020-11-11 PROCEDURE — 73030 X-RAY EXAM OF SHOULDER: CPT | Mod: TC

## 2020-11-11 PROCEDURE — 99204 OFFICE O/P NEW MOD 45 MIN: CPT | Performed by: FAMILY MEDICINE

## 2020-11-11 RX ORDER — LISINOPRIL 5 MG/1
TABLET ORAL
Qty: 90 TABLET | Refills: 0 | Status: SHIPPED | OUTPATIENT
Start: 2020-11-11 | End: 2021-02-01

## 2020-11-11 ASSESSMENT — MIFFLIN-ST. JEOR: SCORE: 1851.38

## 2020-11-11 NOTE — PROGRESS NOTES
Kody Salas  :  1960  DOS: 2020  MRN: [id number]    Sports Medicine Clinic Visit    PCP: Kristen Uribe    Kody Salas is a 59 year old Right hand dominant male who is seen as a self referral presenting with acute left shoulder pain.    Injury: Gradual onset of left shoulder pain over the past ~ 3 - 4 months, after completing multiple projects at his home.  Pain located over left deep posterior glenohumeral joint, radiating to left upper arm and elbow.  Additional Features:  Positive: swelling, weakness and decreased AROM.  Symptoms are better with Rest and tumeric.  Symptoms are worse with: shoulder flexion/abduction, reaching, lifting.  Other evaluation and/or treatments so far consists of: Ice, Ibuprofen, Rest and tumeric.  Recent imaging completed: No recent imaging completed.  Prior History of related problems: none    Second complaint of radiating left lateral elbow pain over the past ~ 2 - 3 months.  Pain is use with lifting, using screwdriver, raking/shoveling.  Waking in AM with bilateral hand numbness, right>>>left.  Currently treating with OTC tumeric with some relief.  No history of left elbow issues.    Social History: retired    Review of Systems  Musculoskeletal: as above  Remainder of review of systems is negative including constitutional, CV, pulmonary, GI, Skin and Neurologic except as noted in HPI or medical history.    Past Medical History:   Diagnosis Date     Arthritis      High cholesterol      Hypertension      Malignant melanoma of left upper extremity including shoulder (H) 2018     Past Surgical History:   Procedure Laterality Date     COLONOSCOPY WITH CO2 INSUFFLATION N/A 2018    Procedure: COLONOSCOPY WITH CO2 INSUFFLATION;  COLON-SCREENING / LB ;  Surgeon: Brandon Ruby MD;  Location:  OR      KNEE SCOPE,MED/LAT MENISCUS REPAIR      right     PROPHYLAXIS RETINA DETACH,CRYO/DIATH  2009     Family History   Problem Relation  "Age of Onset     Hypertension Mother      Hypertension Father      Hypertension Brother      Hypertension Maternal Grandmother      Hypertension Maternal Grandfather      Hypertension Paternal Grandmother      Hypertension Paternal Grandfather      Heart Disease Paternal Grandfather      Anesthesia Reaction No family hx of        Objective  /69   Ht 1.88 m (6' 2\")   Wt 96.2 kg (212 lb)   BMI 27.22 kg/m        General: healthy, alert and in no distress      HEENT: no scleral icterus or conjunctival erythema     Skin: no suspicious lesions or rash. No jaundice.     CV: regular rhythm by palpation, 2+ distal pulses, no pedal edema      Resp: normal respiratory effort without conversational dyspnea     Psych: normal mood and affect      Gait: nonantalgic, appropriate coordination and balance     Neuro: normal light touch sensory exam of the extremities. Motor strength as noted below     Left Shoulder exam    ROM:        Decreased active and passive ROM with flexion, extension, abduction, internal and external rotation.    Tender:        acromioclavicular joint       subacromial space       posterior shoulder       Anterior GH joint    Non Tender:       remainder of shoulder       sternoclavicular joint       periscapular region    Strength:        abduction 5/5       internal rotation 5/5       external rotation 5/5       adduction 5/5    Impingement testing:        positive (+) Neer       neg (-) Dean       Mild pain with empty can       positive (+) crossover       positive (+) crank    Stability testing:       neg (-) anterior glide       neg (-) sulcus sign    Skin:       no visible deformities       well perfused       capillary refill brisk    Sensation:        normal sensation over shoulder and upper extremity       Left Elbow exam  Inspection: no swelling  Tender: lateral epicondyle and common extensor tendon  Non-tender: medial epicondyle, common flexor tendon, flexor muscle of forearm, " supracondylar notch, olecranon bursa, distal bicep tendon and radial head/neck  Range of Motion: all normal  Strength: elbow strength full and pain with resisted wrist extension and supination  Special tests: normal stability, normal valgus stress, normal varus stress, ulnar Tinel's negative, no pain with resisted middle finger extension, no pain with resisted wrist flexion:       Radiology  Recent Results (from the past 744 hour(s))   XR Shoulder Left G/E 3 Views    Narrative    SHOULDER LEFT THREE OR MORE VIEWS  11/11/2020 1:56 PM     HISTORY: Acute pain of left shoulder    COMPARISON: None.      Impression    IMPRESSION: Prominent acromioclavicular degenerative changes.  Glenohumeral joint is unremarkable. No evidence of acute fracture or  subluxation.    KEYONA GUILLEN MD       Assessment:  1. Acute pain of left shoulder    2. Lateral epicondylitis of left elbow        Plan:  Discussed the assessment with the patient.  Follow up: prn based on clinical progress  Overall some improvement after completing projects  XR images independently visualized and reviewed with patient today in clinic  AC joint OA present, mildly painful on exam  Pain with RTC activation, but good strength  Reviewed signs of RTC irritation as well as some GH joint pain  Reviewed gentle HEP, PT available anytime  Reviewed option for diagnostic/therapeutic CSI in the future for labile or worsening sx  No signs of RTC tear based on exam today  We discussed modified progressive pain-free activity as tolerated  Home handouts provided and supportive care reviewed  All questions were answered today  Contact us with additional questions or concerns  Signs and sx of concern reviewed      Joao Eduardo DO, CAQ  Primary Care Sports Medicine  [facility]           Disclaimer: This note consists of symbols derived from keyboarding, dictation and/or voice recognition software. As a result, there may be errors in the script that have  gone undetected. Please consider this when interpreting information found in this chart.

## 2020-11-11 NOTE — LETTER
2020         RE: Kody Salas  [address] [facility]  [place] [address]        Dear Colleague,    Thank you for referring your patient, Kody Salas, to the [facility]. Please see a copy of my visit note below.    Kody Salas  :  1960  DOS: 2020  MRN: [id number]    Sports Medicine Clinic Visit    PCP: Kristen Uribe    Kody Salas is a 59 year old Right hand dominant male who is seen as a self referral presenting with acute left shoulder pain.    Injury: Gradual onset of left shoulder pain over the past ~ 3 - 4 months, after completing multiple projects at his home.  Pain located over left deep posterior glenohumeral joint, radiating to left upper arm and elbow.  Additional Features:  Positive: swelling, weakness and decreased AROM.  Symptoms are better with Rest and tumeric.  Symptoms are worse with: shoulder flexion/abduction, reaching, lifting.  Other evaluation and/or treatments so far consists of: Ice, Ibuprofen, Rest and tumeric.  Recent imaging completed: No recent imaging completed.  Prior History of related problems: none    Second complaint of radiating left lateral elbow pain over the past ~ 2 - 3 months.  Pain is use with lifting, using screwdriver, raking/shoveling.  Waking in AM with bilateral hand numbness, right>>>left.  Currently treating with OTC tumeric with some relief.  No history of left elbow issues.    Social History: retired    Review of Systems  Musculoskeletal: as above  Remainder of review of systems is negative including constitutional, CV, pulmonary, GI, Skin and Neurologic except as noted in HPI or medical history.    Past Medical History:   Diagnosis Date     Arthritis      High cholesterol      Hypertension      Malignant melanoma of left upper extremity including shoulder (H) 2018     Past Surgical History:   Procedure Laterality Date     COLONOSCOPY WITH CO2 INSUFFLATION N/A 2018  "   Procedure: COLONOSCOPY WITH CO2 INSUFFLATION;  COLON-SCREENING / ASHER ;  Surgeon: Brandon Ruby MD;  Location: [facility] OR     HC KNEE SCOPE,MED/LAT MENISCUS REPAIR  2003    right     PROPHYLAXIS RETINA DETACH,CRYO/DIATH  12/2009     Family History   Problem Relation Age of Onset     Hypertension Mother      Hypertension Father      Hypertension Brother      Hypertension Maternal Grandmother      Hypertension Maternal Grandfather      Hypertension Paternal Grandmother      Hypertension Paternal Grandfather      Heart Disease Paternal Grandfather      Anesthesia Reaction No family hx of        Objective  /69   Ht 1.88 m (6' 2\")   Wt 96.2 kg (212 lb)   BMI 27.22 kg/m        General: healthy, alert and in no distress      HEENT: no scleral icterus or conjunctival erythema     Skin: no suspicious lesions or rash. No jaundice.     CV: regular rhythm by palpation, 2+ distal pulses, no pedal edema      Resp: normal respiratory effort without conversational dyspnea     Psych: normal mood and affect      Gait: nonantalgic, appropriate coordination and balance     Neuro: normal light touch sensory exam of the extremities. Motor strength as noted below     Left Shoulder exam    ROM:        Decreased active and passive ROM with flexion, extension, abduction, internal and external rotation.    Tender:        acromioclavicular joint       subacromial space       posterior shoulder       Anterior GH joint    Non Tender:       remainder of shoulder       sternoclavicular joint       periscapular region    Strength:        abduction 5/5       internal rotation 5/5       external rotation 5/5       adduction 5/5    Impingement testing:        positive (+) Neer       neg (-) Dean       Mild pain with empty can       positive (+) crossover       positive (+) crank    Stability testing:       neg (-) anterior glide       neg (-) sulcus sign    Skin:       no visible deformities       well perfused       capillary " refill brisk    Sensation:        normal sensation over shoulder and upper extremity       Left Elbow exam  Inspection: no swelling  Tender: lateral epicondyle and common extensor tendon  Non-tender: medial epicondyle, common flexor tendon, flexor muscle of forearm, supracondylar notch, olecranon bursa, distal bicep tendon and radial head/neck  Range of Motion: all normal  Strength: elbow strength full and pain with resisted wrist extension and supination  Special tests: normal stability, normal valgus stress, normal varus stress, ulnar Tinel's negative, no pain with resisted middle finger extension, no pain with resisted wrist flexion:       Radiology  Recent Results (from the past 744 hour(s))   XR Shoulder Left G/E 3 Views    Narrative    SHOULDER LEFT THREE OR MORE VIEWS  11/11/2020 1:56 PM     HISTORY: Acute pain of left shoulder    COMPARISON: None.      Impression    IMPRESSION: Prominent acromioclavicular degenerative changes.  Glenohumeral joint is unremarkable. No evidence of acute fracture or  subluxation.    KEYONA GUILLEN MD       Assessment:  1. Acute pain of left shoulder    2. Lateral epicondylitis of left elbow        Plan:  Discussed the assessment with the patient.  Follow up: prn based on clinical progress  Overall some improvement after completing projects  XR images independently visualized and reviewed with patient today in clinic  AC joint OA present, mildly painful on exam  Pain with RTC activation, but good strength  Reviewed signs of RTC irritation as well as some GH joint pain  Reviewed gentle HEP, PT available anytime  Reviewed option for diagnostic/therapeutic CSI in the future for labile or worsening sx  No signs of RTC tear based on exam today  We discussed modified progressive pain-free activity as tolerated  Home handouts provided and supportive care reviewed  All questions were answered today  Contact us with additional questions or concerns  Signs and sx of concern  reviewed      Joao Eduardo DO, MAGUE  Primary Care Sports Medicine  [facility]           Disclaimer: This note consists of symbols derived from keyboarding, dictation and/or voice recognition software. As a result, there may be errors in the script that have gone undetected. Please consider this when interpreting information found in this chart.      Again, thank you for allowing me to participate in the care of your patient.        Sincerely,        Joao Eduardo DO

## 2020-12-06 ENCOUNTER — HEALTH MAINTENANCE LETTER (OUTPATIENT)
Age: 60
End: 2020-12-06

## 2020-12-14 NOTE — PROGRESS NOTES
"Subjective     Kody Salas is a 60 year old male who presents to clinic today for the following health issues:    HPI         Musculoskeletal problem/pain  Onset/Duration: 2-3months ago  Description  Location: shoulder - right and other joints worse in the morning  Joint Swelling: YES elbows  Redness: no  Pain: YES  Warmth: no  Intensity:  moderate  Progression of Symptoms:  worsening  Accompanying signs and symptoms:   Fevers: no  Numbness/tingling/weakness: YES- Numbness/tingling  History  Trauma to the area: no  Recent illness:  no  Previous similar problem: YES  Previous evaluation:  YES  Precipitating or alleviating factors:  Aggravating factors include: lifting and overuse  Therapies tried and outcome: ice, immobilization,ibuprofen, tylenol and stretching didn't help  Has seen Ortho  Worried about Lyme's Disease  As he is [place] the woods  No rashes    Review of Systems   Rest of the pertinent  ROS is Negative except see above and Problem list [stable]          Objective    /78   Pulse 73   Temp 98.4  F (36.9  C) (Oral)   Resp 18   Ht 1.88 m (6' 2\")   Wt 94.3 kg (208 lb)   SpO2 98%   BMI 26.71 kg/m    Body mass index is 26.71 kg/m .  Physical Exam   GENERAL: healthy, alert and no distress  RESP: lungs clear to auscultation - no rales, rhonchi or wheezes  CV: regular rate and rhythm, normal S1 S2, no S3 or S4, no murmur, click or rub, no peripheral edema and peripheral pulses strong  MS: no gross musculoskeletal defects noted, no edema  SKIN: no suspicious lesions or rashes  NEURO: Normal strength and tone, mentation intact and speech normal  PSYCH: mentation appears normal  Left shoulder -pain with External Rotation and abduction  Limited Movement  Tenderness lateral epicondyle left Elbow  Right shoulder Good ROM but has pain  No c-spine tenderness   Mild Right paracervical muscle spasm    Pending         Assessment & Plan     Kody was seen today for possible lymes " disease.    Diagnoses and all orders for this visit:    Bilateral shoulder pain, unspecified chronicity  -     **Lyme Disease Jennifer with reflex to WB Serum FUTURE 14d  -     Erythrocyte sedimentation rate auto  -     [facility]; Future    Bilateral elbow joint pain  -     **Lyme Disease Jennifer with reflex to WB Serum FUTURE 14d  -     Erythrocyte sedimentation rate auto  -     [facility]; Future    Personal history of tobacco use  -     Prof Fee: Shared Decision Making Visit for Lung Cancer Screening  -     CT Chest Lung Cancer Scrn Low Dose wo; Future    advised PT  Pt does not want meds  Advised stretching  No heavy work  Recommended flu shot-Pt declined  Recommended covid immunization when available  Discussed also due for Shingles vaccination  Return in about 1 month (around 1/15/2021) for Physical Exam.    Kristen Uribe MD  [facility]    Lung Cancer Screening Shared Decision Making Visit -pt given handout    Kody RANDA Salas is eligible for lung cancer screening on the basis of the information provided in my signed lung cancer screening order.     I have discussed with patient the risks and benefits of screening for lung cancer with low-dose CT.     The risks include:  radiation exposure: one low dose chest CT has as much ionizing radiation as about 15 chest x-rays or 6 months of background radiation living in [place]    false positives: 96% of positive findings/nodules are NOT cancer, but some might still require additional diagnostic evaluation, including biopsy  over-diagnosis: some slow growing cancers that might never have been clinically significant will be detected and treated unnecessarily     The benefit of early detection of lung cancer is contingent upon adherence to annual screening or more frequent follow up if indicated.     Furthermore, reaping the benefits of screening requires Kody Salas to be willing  and physically able to undergo diagnostic procedures, if indicated. Although no specific guide is available for determining severity of comorbidities, it is reasonable to withhold screening in patients who have greater mortality risk from other diseases.     We did discuss that the only way to prevent lung cancer is to not smoke. Smoking cessation counseling was not given.

## 2020-12-15 ENCOUNTER — OFFICE VISIT (OUTPATIENT)
Dept: FAMILY MEDICINE | Facility: CLINIC | Age: 60
End: 2020-12-15
Payer: COMMERCIAL

## 2020-12-15 VITALS
HEART RATE: 73 BPM | WEIGHT: 208 LBS | BODY MASS INDEX: 26.69 KG/M2 | OXYGEN SATURATION: 98 % | HEIGHT: 74 IN | DIASTOLIC BLOOD PRESSURE: 78 MMHG | TEMPERATURE: 98.4 F | SYSTOLIC BLOOD PRESSURE: 138 MMHG | RESPIRATION RATE: 18 BRPM

## 2020-12-15 DIAGNOSIS — M25.512 BILATERAL SHOULDER PAIN, UNSPECIFIED CHRONICITY: ICD-10-CM

## 2020-12-15 DIAGNOSIS — M25.521 BILATERAL ELBOW JOINT PAIN: ICD-10-CM

## 2020-12-15 DIAGNOSIS — M25.511 BILATERAL SHOULDER PAIN, UNSPECIFIED CHRONICITY: ICD-10-CM

## 2020-12-15 DIAGNOSIS — M25.522 BILATERAL ELBOW JOINT PAIN: ICD-10-CM

## 2020-12-15 DIAGNOSIS — Z87.891 PERSONAL HISTORY OF TOBACCO USE: ICD-10-CM

## 2020-12-15 LAB — ERYTHROCYTE [SEDIMENTATION RATE] IN BLOOD BY WESTERGREN METHOD: 5 MM/H (ref 0–20)

## 2020-12-15 PROCEDURE — 86618 LYME DISEASE ANTIBODY: CPT | Performed by: FAMILY MEDICINE

## 2020-12-15 PROCEDURE — 36415 COLL VENOUS BLD VENIPUNCTURE: CPT | Performed by: FAMILY MEDICINE

## 2020-12-15 PROCEDURE — G0296 VISIT TO DETERM LDCT ELIG: HCPCS | Performed by: FAMILY MEDICINE

## 2020-12-15 PROCEDURE — 85652 RBC SED RATE AUTOMATED: CPT | Performed by: FAMILY MEDICINE

## 2020-12-15 PROCEDURE — 99213 OFFICE O/P EST LOW 20 MIN: CPT | Mod: 25 | Performed by: FAMILY MEDICINE

## 2020-12-15 ASSESSMENT — PATIENT HEALTH QUESTIONNAIRE - PHQ9: SUM OF ALL RESPONSES TO PHQ QUESTIONS 1-9: 0

## 2020-12-15 ASSESSMENT — PAIN SCALES - GENERAL: PAINLEVEL: MODERATE PAIN (5)

## 2020-12-15 ASSESSMENT — MIFFLIN-ST. JEOR: SCORE: 1828.23

## 2020-12-15 NOTE — PATIENT INSTRUCTIONS
Lung Cancer Screening   Frequently Asked Questions  If you are at high-risk for lung cancer, getting screened with low-dose computed tomography (LDCT) every year can help save your life. This handout offers answers to some of the most common questions about lung cancer screening. If you have other questions, please call [phone] ([phone]).     What is it?  Lung cancer screening uses special X-ray technology to create an image of your lung tissue. The exam is quick and easy and takes less than 10 seconds. We don t give you any medicine or use any needles. You can eat before and after the exam. You don t need to change your clothes as long as the clothing on your chest doesn t contain metal. But, you do need to be able to hold your breath for at least 6 seconds during the exam.    What is the goal of lung cancer screening?  The goal of lung cancer screening is to save lives. Many times, lung cancer is not found until a person starts having physical symptoms. Lung cancer screening can help detect lung cancer in the earliest stages when it may be easier to treat.    Who should be screened for lung cancer?  We suggest lung cancer screening for anyone who is at high-risk for lung cancer. You are in the high-risk group if you:      are between the ages of 55 and 79, and    have smoked at least 1 pack of cigarettes a day for 30 or more years, and    still smoke or have quit within the past 15 years.    However, if you have a new cough or shortness of breath, you should talk to your doctor before being screened.    Some national lung health advocacy groups also recommend screening for people ages 50 to 79 who have smoked an average of 1 pack of cigarettes a day for 20 years. They must also have at least 1 other risk factor for lung cancer, not including exposure to secondhand smoke. Other risk factors are having had cancer in the past, emphysema, pulmonary fibrosis, COPD, a family history of lung cancer, or  exposure to certain materials such as arsenic, asbestos, beryllium, cadmium, chromium, diesel fumes, nickel, radon or silica. Your care team can help you know if you have one of these risk factors.     Why does it matter if I have symptoms?  Certain symptoms can be a sign that you have a condition in your lungs that should be checked and treated by your doctor. These symptoms include fever, chest pain, a new or changing cough, shortness of breath that you have never felt before, coughing up blood or unexplained weight loss. Having any of these symptoms can greatly affect the results of lung cancer screening.       Should all smokers get an LDCT lung cancer screening exam?  It depends. Lung cancer screening is for a very specific group of men and women who have a history of heavy smoking over a long period of time (see  Who should be screened for lung cancer  above).  I am in the high-risk group, but have been diagnosed with cancer in the past. Is LDCT lung cancer screening right for me?  In some cases, you should not have LDCT lung screening, such as when your doctor is already following your cancer with CT scan studies. Your doctor will help you decide if LDCT lung screening is right for you.  Do I need to have a screening exam every year?  Yes. If you are in the high-risk group described earlier, you should get an LDCT lung cancer screening exam every year until you are 79, or are no longer willing or able to undergo screening and possible procedures to diagnose and treat lung cancer.  How effective is LDCT at preventing death from lung cancer?  Studies have shown that LDCT lung cancer screening can lower the risk of death from lung cancer by 20 percent in people who are at high-risk.  What are the risks?  There are some risks and limitations of LDCT lung cancer screening. We want to make sure you understand the risks and benefits, so please let us know if you have any questions. Your doctor may want to talk with  you more about these risks.    Radiation exposure: As with any exam that uses radiation, there is a very small increased risk of cancer. The amount of radiation in LDCT is small--about the same amount a person would get from a mammogram. Your doctor orders the exam when he or she feels the potential benefits outweigh the risks.    False negatives: No test is perfect, including LDCT. It is possible that you may have a medical condition, including lung cancer, that is not found during your exam. This is called a false negative result.    False positives and more testing: LDCT very often finds something in the lung that could be cancer, but in fact is not. This is called a false positive result. False positive tests often cause anxiety. To make sure these findings are not cancer, you may need to have more tests. These tests will be done only if you give us permission. Sometimes patients need a treatment that can have side effects, such as a biopsy. For more information on false positives, see  What can I expect from the results?     Findings not related to lung cancer: Your LDCT exam also takes pictures of areas of your body next to your lungs. In a very small number of cases, the CT scan will show an abnormal finding in one of these areas, such as your kidneys, adrenal glands, liver or thyroid. This finding may not be serious, but you may need more tests. Your doctor can help you decide what other tests you may need, if any.  What can I expect from the results?  About 1 out of 4 LDCT exams will find something that may need more tests. Most of the time, these findings are lung nodules. Lung nodules are very small collections of tissue in the lung. These nodules are very common, and the vast majority--more than 97 percent--are not cancer (benign). Most are normal lymph nodes or small areas of scarring from past infections.  But, if a small lung nodule is found to be cancer, the cancer can be cured more than 90 percent  of the time. To know if the nodule is cancer, we may need to get more images before your next yearly screening exam. If the nodule has suspicious features (for example, it is large, has an odd shape or grows over time), we will refer you to a specialist for further testing.  Will my doctor also get the results?  Yes. Your doctor will get a copy of your results.  Is it okay to keep smoking now that there s a cancer screening exam?  No. Tobacco is one of the strongest cancer-causing agents. It causes not only lung cancer, but other cancers and cardiovascular (heart) diseases as well. The damage caused by smoking builds over time. This means that the longer you smoke, the higher your risk of disease. While it is never too late to quit, the sooner you quit, the better.  Where can I find help to quit smoking?  The best way to prevent lung cancer is to stop smoking. If you have already quit smoking, congratulations and keep it up! For help on quitting smoking, please call [employer] at 0-348-745-KRVF ([phone]) or the American Cancer Society at 1-765.920.9326 to find local resources near you.  One-on-one health coaching:  If you d prefer to work individually with a health care provider on tobacco cessation, we offer:      Medication Therapy Management:  Our specially trained pharmacists work closely with you and your doctor to help you quit smoking.  Call [phone] or [phone] (toll free).     Can Do: Health coaching offered by [facility].  [email or web address]    Patient Education     Understanding Lateral Epicondylitis     Tendons are strong bands of tissue that connect muscles to bones. Lateral epicondylitis affects the tendons that connect muscles in the forearm to the lateral epicondyle. This is the bony knob on the outer side of the elbow. The condition occurs if the extensor tendons of the wrist become painful and swollen (irritated). This can cause pain in the elbow, forearm, and wrist.  Because the condition is sometimes caused by playing tennis, it's also known as  tennis elbow.     How to say it  LA-tuhr-vivi [id number]   What causes lateral epicondylitis?  The condition most often occurs because of overuse. This can be from any activity that repeatedly puts stress on the forearm extensor muscles or tendons and wrist. For instance, playing tennis, lifting weights, cutting meat, painting, and typing can all cause the condition. Wear and tear of the tendons from aging or an injury to the tendons can also cause the condition.   Symptoms of lateral epicondylitis  The most common symptom is pain. You may feel it on the outer side of the elbow and down the back of the forearm. It may be worse when moving or using the elbow, forearm, or wrist. You may also feel pain when gripping or lifting things.   Treatment for lateral epicondylitis  Treatments may include:    Resting the elbow, forearm, and wrist. You ll need to avoid movements that can make your symptoms worse. You also may need to avoid certain sports and types of work for a time. This helps relieve symptoms and prevent further damage to the tendons.    Changing the action that caused the problem. For instance, if the tendons were damaged from playing tennis, it may help to change your playing technique or use different equipment. This helps prevent further damage to the tendons.    Using cold packs. Putting an ice pack on the injured area can help reduce pain and swelling.    Taking pain medicines. Taking prescription or over-the-counter pain medicines may help reduce pain and swelling.      Wearing a brace. This helps reduce strain on the muscles and tendons in the forearm, which may relieve symptoms. It's very important to wear the brace properly.    Doing exercises and physical therapy. These help improve strength and range of motion in the elbow, forearm, and wrist.    Getting shots of medicine into the injured area. These may help  relieve symptoms for a time.    Having surgery. This may be an option if other treatments fail to relieve symptoms. In many cases, the surgeon removes the damaged tissue.  Possible complications of lateral epicondylitis  If the tendons involved don t heal properly, symptoms may return or get worse. To help prevent this, follow your treatment plan as directed.   When to call your healthcare provider  Call your healthcare provider right away if you have any of these:    Fever of 100.4 F (38 C) or higher, or as directed by your provider    Chills    Redness, swelling, or warmth in the elbow or forearm that gets worse    Symptoms that don t get better with treatment, or get worse    New symptoms  [employer] last reviewed this educational content on 6/1/2019 2000-2020 [employer]. [address] [place] [address], [place] [address]. All rights reserved. This information is not intended as a substitute for professional medical care. Always follow your healthcare professional's instructions.           Patient Education     Treating Tennis Elbow    Your treatment will depend on how inflamed your tendon is. The goal is to ease your symptoms and help you regain full use of your elbow.  Rest and medicine  Wear a tennis elbow splint to let the inflamed tendon rest and heal. It must be worn correctly. It should be placed down the arm past the painful area of the elbow. It can make symptoms worse if it's directly over the inflamed tendon. Take stress off the tendon by using your other hand or changing your . Take NSAIDs (nonsteroidal anti-inflammatory drugs) and use ice to ease pain and swelling.  Exercises and therapy  Your healthcare provider may give you an exercise program. He or she may send you to a physical therapist. That expert will teach you how to gently stretch and strengthen the muscles around your elbow.  Anti-inflammatory shots  Your healthcare provider may give you shots of an anti-inflammatory medicine  such as cortisone. This helps ease swelling. You may have more pain at first. But your elbow should feel better in a few days.  If surgery is needed  If your symptoms go on for a long time, or other treatments don t work, your healthcare provider may recommend surgery. Surgery repairs the inflamed tendon.  [employer] last reviewed this educational content on 1/1/2018 2000-2020 [employer]. [address] [place] [address], [place] [address]. All rights reserved. This information is not intended as a substitute for professional medical care. Always follow your healthcare professional's instructions.

## 2020-12-17 LAB — B BURGDOR IGG+IGM SER QL: 0.08 (ref 0–0.89)

## 2021-01-05 ENCOUNTER — ANCILLARY PROCEDURE (OUTPATIENT)
Dept: CT IMAGING | Facility: CLINIC | Age: 61
End: 2021-01-05
Attending: FAMILY MEDICINE
Payer: COMMERCIAL

## 2021-01-05 ENCOUNTER — THERAPY VISIT (OUTPATIENT)
Dept: PHYSICAL THERAPY | Facility: CLINIC | Age: 61
End: 2021-01-05
Attending: FAMILY MEDICINE
Payer: COMMERCIAL

## 2021-01-05 DIAGNOSIS — Z87.891 PERSONAL HISTORY OF TOBACCO USE: ICD-10-CM

## 2021-01-05 DIAGNOSIS — M25.521 BILATERAL ELBOW JOINT PAIN: ICD-10-CM

## 2021-01-05 DIAGNOSIS — M25.522 BILATERAL ELBOW JOINT PAIN: ICD-10-CM

## 2021-01-05 DIAGNOSIS — M25.512 BILATERAL SHOULDER PAIN, UNSPECIFIED CHRONICITY: ICD-10-CM

## 2021-01-05 DIAGNOSIS — M25.511 BILATERAL SHOULDER PAIN, UNSPECIFIED CHRONICITY: ICD-10-CM

## 2021-01-05 PROCEDURE — 97110 THERAPEUTIC EXERCISES: CPT | Mod: GP | Performed by: PHYSICAL THERAPIST

## 2021-01-05 PROCEDURE — 97161 PT EVAL LOW COMPLEX 20 MIN: CPT | Mod: GP | Performed by: PHYSICAL THERAPIST

## 2021-01-05 PROCEDURE — 71271 CT THORAX LUNG CANCER SCR C-: CPT | Mod: TC | Performed by: RADIOLOGY

## 2021-01-05 NOTE — PROGRESS NOTES
[facility] Initial Evaluation  Subjective:  The history is provided by the patient. No  was used.   Therapist Generated HPI Evaluation  Problem details: Patient reports onset of left shoulder and elbow pain beginning in October and feels this is due to overworking the arm with household activities. He reports he has had a gradual onset of similar symptoms in the right shoulder and elbow since that time. Patient is left-hand dominant.         Type of problem:  Bilateral shoulders (L>R).    This is a chronic condition.  Condition occurred with:  Repetition/overuse.  Where condition occurred: at home.  Patient reports pain:  Lateral and upper arm.  Pain is described as aching, sharp and stabbing and is intermittent.  Pain is worse during the day.  Since onset symptoms are unchanged.  Associated symptoms:  Loss of motion/stiffness. Symptoms are exacerbated by lifting, using arm at shoulder level, using arm behind back and using arm overhead  and relieved by rest.  Special tests included:  X-ray.    Barriers include:  None as reported by patient.    Therapist Generated HPI Evaluation         Type of problem:  Bilateral elbows (L>R).    This is a chronic condition.  Condition occurred with:  Repetition/overuse.  Where condition occurred: at home.  Patient reports pain:  Lateral epicondyle and forearm.  Pain is described as aching and sharp and is intermittent.  Pain is worse during the day.  Since onset symptoms are unchanged.  Associated symptoms:  Loss of strength and loss of motion/stiffness. Symptoms are exacerbated by lifting and certain positions  and relieved by rest.      Barriers include:  None as reported by patient.    Patient Health History  Kody Salas being seen for bilateral shoulder & elbow pain.     Date of Onset: October 2020.   Problem occurred: overuse   Pain is reported as 4/10 (up to 8/10) on pain scale.  General health as reported by patient is  good.  Pertinent medical history includes: cancer, high blood pressure, smoking and other (lumbar spinal stenosis/arthritis).   Red flags:  None as reported by patient.  Medical allergies: none.   Surgeries include:  Cancer surgery, orthopedic surgery and other. Other surgery history details: skin cancer, knee, eye retina.    Current medications:  Anti-depressants, high blood pressure medication and other. Other medications details: cholesterol.    Current occupation is retired.   Primary job tasks include:  Lifting/carrying and pushing/pulling.                                    Objective:  Standing Alignment:      Shoulder/UE:  Rounded shoulders, elevated scapula L, elevated scapula R, protracted scapula L and protracted scapula R                                       Shoulder Evaluation:  ROM:  AROM:    Flexion:  Left:  116    Right:  152  Extension: Left: 35Right: 45  Abduction:  Left: 65   Right:  123                  Extension/Internal Rotation:  Left:  L5    Right:  T10          Strength:    Flexion: Left:3-/5    Pain: -    Right: 5-/5      Pain:  -    Abduction:  Left: 3-/5   Pain:-    Right: 4+/5      Pain:-    Internal Rotation:  Left:4+/5      Pain:-    Right: 5-/5      Pain:-  External Rotation:   Left:4+/5      Pain:-   Right:5-/5      Pain:-                       ROM:  AROM:    Hyperextension Elbow: Right:  2  Flexion Elbow:  Left:145   Right:148  Extension Elbow:  Left: 10                        Strength:    Flexion Elbow:  Left: 5/5  Pain:-    Right: 5/5  Pain:-  Extension Elbow: Left: 5/5  Pain:-    Right: 5/5  Pain:-  Flexion Wrist:  Left: 5/5 Pain:-    Right: 5/5  Pain:-  Extension Wrist: Left: 4+/5  Pain:+  Right: 5-/5  Pain:+/-      Radial Deviation:  Left: 5/5  Pain:-    Right: 5/5  Pain:-  Ulnar Deviation: Left: 5/5  Pain:+/-    Right: 5/5  Pain:-      Palpation:    Left wrist/elbow tenderness present at: Lateral Epicondyle and Wrist Extensors  Right wrist/elbow tenderness present at:Lateral  Epicondyle and Wrist Extensors                                General     ROS    Assessment/Plan:    Patient is a 60 year old male with bilateral shoulder and bilateral elbow complaints.    Patient has the following significant findings with corresponding treatment plan.                Diagnosis 1:  Bilateral shoulder & elbow pain  Pain -  self management, education, directional preference exercise and home program  Decreased ROM/flexibility - manual therapy, therapeutic exercise and home program  Decreased strength - therapeutic exercise, therapeutic activities and home program  Impaired muscle performance - neuro re-education and home program  Decreased function - therapeutic activities and home program  Impaired posture - neuro re-education and home program    Therapy Evaluation Codes:     Cumulative Therapy Evaluation is: Low complexity.    Previous and current functional limitations:  (See Goal Flow Sheet for this information)    Short term and Long term goals: (See Goal Flow Sheet for this information)     Communication ability:  Patient appears to be able to clearly communicate and understand verbal and written communication and follow directions correctly.  Treatment Explanation - The following has been discussed with the patient:   RX ordered/plan of care  Anticipated outcomes  Possible risks and side effects  This patient would benefit from PT intervention to resume normal activities.   Rehab potential is good.    Frequency:  1 X week, once daily  Duration:  for 8 weeks  Discharge Plan:  Achieve all LTG.  Independent in home treatment program.  Reach maximal therapeutic benefit.    Please refer to the daily flowsheet for treatment today, total treatment time and time spent performing 1:1 timed codes.

## 2021-02-01 ENCOUNTER — OFFICE VISIT (OUTPATIENT)
Dept: FAMILY MEDICINE | Facility: CLINIC | Age: 61
End: 2021-02-01
Payer: COMMERCIAL

## 2021-02-01 VITALS
DIASTOLIC BLOOD PRESSURE: 81 MMHG | WEIGHT: 211 LBS | RESPIRATION RATE: 18 BRPM | TEMPERATURE: 98.9 F | OXYGEN SATURATION: 99 % | HEART RATE: 99 BPM | SYSTOLIC BLOOD PRESSURE: 121 MMHG | BODY MASS INDEX: 27.08 KG/M2 | HEIGHT: 74 IN

## 2021-02-01 DIAGNOSIS — R73.02 IMPAIRED GLUCOSE TOLERANCE: ICD-10-CM

## 2021-02-01 DIAGNOSIS — H91.90 HEARING DISORDER, UNSPECIFIED LATERALITY: ICD-10-CM

## 2021-02-01 DIAGNOSIS — F10.11 ALCOHOL ABUSE, IN REMISSION: ICD-10-CM

## 2021-02-01 DIAGNOSIS — Z12.5 SCREENING FOR PROSTATE CANCER: ICD-10-CM

## 2021-02-01 DIAGNOSIS — Z00.01 ENCOUNTER FOR ROUTINE ADULT PHYSICAL EXAM WITH ABNORMAL FINDINGS: ICD-10-CM

## 2021-02-01 DIAGNOSIS — F34.1 DYSTHYMIA: ICD-10-CM

## 2021-02-01 DIAGNOSIS — F41.1 GAD (GENERALIZED ANXIETY DISORDER): ICD-10-CM

## 2021-02-01 DIAGNOSIS — I10 BENIGN ESSENTIAL HYPERTENSION: ICD-10-CM

## 2021-02-01 DIAGNOSIS — E78.5 HYPERLIPIDEMIA LDL GOAL <130: ICD-10-CM

## 2021-02-01 LAB
ANION GAP SERPL CALCULATED.3IONS-SCNC: 1 MMOL/L (ref 3–14)
BUN SERPL-MCNC: 15 MG/DL (ref 7–30)
CALCIUM SERPL-MCNC: 9.5 MG/DL (ref 8.5–10.1)
CHLORIDE SERPL-SCNC: 108 MMOL/L (ref 94–109)
CHOLEST SERPL-MCNC: 180 MG/DL
CO2 SERPL-SCNC: 31 MMOL/L (ref 20–32)
CREAT SERPL-MCNC: 0.75 MG/DL (ref 0.66–1.25)
GFR SERPL CREATININE-BSD FRML MDRD: >90 ML/MIN/{1.73_M2}
GLUCOSE SERPL-MCNC: 103 MG/DL (ref 70–99)
HBA1C MFR BLD: 5.7 % (ref 0–5.6)
HDLC SERPL-MCNC: 51 MG/DL
LDLC SERPL CALC-MCNC: 108 MG/DL
NONHDLC SERPL-MCNC: 129 MG/DL
POTASSIUM SERPL-SCNC: 4.7 MMOL/L (ref 3.4–5.3)
PSA SERPL-ACNC: 0.5 UG/L (ref 0–4)
SODIUM SERPL-SCNC: 140 MMOL/L (ref 133–144)
TRIGL SERPL-MCNC: 103 MG/DL

## 2021-02-01 PROCEDURE — 83036 HEMOGLOBIN GLYCOSYLATED A1C: CPT | Performed by: FAMILY MEDICINE

## 2021-02-01 PROCEDURE — 80048 BASIC METABOLIC PNL TOTAL CA: CPT | Performed by: FAMILY MEDICINE

## 2021-02-01 PROCEDURE — 99396 PREV VISIT EST AGE 40-64: CPT | Performed by: FAMILY MEDICINE

## 2021-02-01 PROCEDURE — 36415 COLL VENOUS BLD VENIPUNCTURE: CPT | Performed by: FAMILY MEDICINE

## 2021-02-01 PROCEDURE — 80061 LIPID PANEL: CPT | Performed by: FAMILY MEDICINE

## 2021-02-01 PROCEDURE — 99214 OFFICE O/P EST MOD 30 MIN: CPT | Mod: 25 | Performed by: FAMILY MEDICINE

## 2021-02-01 PROCEDURE — G0103 PSA SCREENING: HCPCS | Performed by: FAMILY MEDICINE

## 2021-02-01 RX ORDER — LISINOPRIL 5 MG/1
5 TABLET ORAL DAILY
Qty: 90 TABLET | Refills: 3 | Status: SHIPPED | OUTPATIENT
Start: 2021-02-01 | End: 2021-12-29

## 2021-02-01 RX ORDER — ATORVASTATIN CALCIUM 20 MG/1
TABLET, FILM COATED ORAL
Qty: 90 TABLET | Refills: 3 | Status: SHIPPED | OUTPATIENT
Start: 2021-02-01 | End: 2022-02-18

## 2021-02-01 ASSESSMENT — PAIN SCALES - GENERAL: PAINLEVEL: NO PAIN (0)

## 2021-02-01 ASSESSMENT — MIFFLIN-ST. JEOR: SCORE: 1841.84

## 2021-02-01 NOTE — PROGRESS NOTES
SUBJECTIVE:   CC: Kody Salas is an 60 year old male who presents for preventative health visit.       Patient has been advised of split billing requirements and indicates understanding: Yes  Healthy Habits:    Getting at least 3 servings of Calcium per day:  Yes    Bi-annual eye exam:  Yes    Dental care twice a year:  Yes    Sleep apnea or symptoms of sleep apnea:  None    Diet:  Regular (no restrictions)    Frequency of exercise:  None    Duration of exercise:  N/A    Taking medications regularly:  0    Barriers to taking medications:  None    Medication side effects:  None    PHQ-2 Total Score:    Additional concerns today:  No  History of Present Illness       Mental Health Follow-up:  Patient presents to follow-up on Depression.Patient's depression since last visit has been:  Better  The patient is not having other symptoms associated with depression.      Any significant life events: No  Patient is not feeling anxious or having panic attacks.  Patient has no concerns about alcohol or drug use.     Social History  Tobacco Use    Smoking status: Former Smoker      Packs/day: 1.00      Years: 35.00      Pack years: 35      Quit date: 6/15/2012      Years since quittin.6    Smokeless tobacco: Never Used  Alcohol use: No    Comment: quit 2015  Drug use: No      Today's PHQ-9         PHQ-9 Total Score:         PHQ-9 Q9 Thoughts of better off dead/self-harm past 2 weeks :       Thoughts of suicide or self harm:      Self-harm Plan:        Self-harm Action:          Safety concerns for self or others:           Hyperlipidemia:  He presents for follow up of hyperlipidemia.  He is taking medication to lower cholesterol. He is not having myalgia or other side effects to statin medications.    Hypertension: He presents for follow up of hypertension.  He does not check blood pressure  regularly outside of the clinic.  He does not follow a low salt diet.     He eats 0-1 servings of fruits and  vegetables daily.He consumes 0 sweetened beverage(s) daily.He exercises with enough effort to increase his heart rate 9 or less minutes per day.  He exercises with enough effort to increase his heart rate 3 or less days per week.   He is taking medications regularly.  He is not taking prescribed medications regularly due to None.              Today's PHQ-2 Score:   PHQ-2 (  [employer]) 2021   Q1: Little interest or pleasure in doing things -   Q2: Feeling down, depressed or hopeless -   PHQ-2 Score -   Q1: Little interest or pleasure in doing things -   Q2: Feeling down, depressed or hopeless -   PHQ-2 Score Incomplete       Abuse: Current or Past(Physical, Sexual or Emotional)- No  Do you feel safe in your environment? Yes        Social History     Tobacco Use     Smoking status: Former Smoker     Packs/day: 1.00     Years: 35.00     Pack years: 35.00     Quit date: 6/15/2012     Years since quittin.6     Smokeless tobacco: Never Used   Substance Use Topics     Alcohol use: No     Comment: quit 2015     If you drink alcohol do you typically have >3 drinks per day or >7 drinks per week? No    No flowsheet data found.No flowsheet data found.    Last PSA:   PSA   Date Value Ref Range Status   2018 0.61 0 - 4 ug/L Final     Comment:     Assay Method:  Chemiluminescence using Siemens Vista analyzer       Reviewed orders with patient. Reviewed health maintenance and updated orders accordingly - Yes  Lab work is in process  Labs reviewed in EPIC  BP Readings from Last 3 Encounters:   21 121/81   12/15/20 138/78   20 130/69    Wt Readings from Last 3 Encounters:   21 95.7 kg (211 lb)   12/15/20 94.3 kg (208 lb)   20 96.2 kg (212 lb)                  Patient Active Problem List   Diagnosis     Hyperlipidemia LDL goal <130     Seasonal allergies     Hypertension goal BP (blood pressure) < 140/90     GABE (generalized anxiety disorder)     GERD (gastroesophageal reflux disease)      Eczema     Obesity     History of ETOH abuse     Chronic back pain     Impaired glucose tolerance     Anxiety     Malignant melanoma of left upper extremity including shoulder (H)     Alcohol abuse, in remission     Bilateral shoulder pain, unspecified chronicity     Bilateral elbow joint pain     Past Surgical History:   Procedure Laterality Date     COLONOSCOPY WITH CO2 INSUFFLATION N/A 2018    Procedure: COLONOSCOPY WITH CO2 INSUFFLATION;  COLON-SCREENING / ASHER ;  Surgeon: Brandon Ruby MD;  Location: [facility] OR     HC KNEE SCOPE,MED/LAT MENISCUS REPAIR      right     PROPHYLAXIS RETINA DETACH,CRYO/DIATH  2009       Social History     Tobacco Use     Smoking status: Former Smoker     Packs/day: 1.00     Years: 35.00     Pack years: 35.00     Quit date: 6/15/2012     Years since quittin.6     Smokeless tobacco: Never Used   Substance Use Topics     Alcohol use: No     Comment: quit 2015     Family History   Problem Relation Age of Onset     Hypertension Mother      Hypertension Father      Hypertension Brother      Hypertension Maternal Grandmother      Hypertension Maternal Grandfather      Hypertension Paternal Grandmother      Hypertension Paternal Grandfather      Heart Disease Paternal Grandfather      Anesthesia Reaction No family hx of          Current Outpatient Medications   Medication Sig Dispense Refill     atorvastatin (LIPITOR) 20 MG tablet TAKE 1 TABLET BY MOUTH EVERY DAY 90 tablet 3     FLUoxetine (PROZAC) 20 MG capsule TAKE 1 CAPSULE BY MOUTH EVERY DAY 90 capsule 1     lisinopril (ZESTRIL) 5 MG tablet Take 1 tablet (5 mg) by mouth daily 90 tablet 3     mometasone (ELOCON) 0.1 % external cream APPLY TO AFFECTED AREA DAILY AS DIRECTED 45 g 1     omeprazole (PRILOSEC) 20 MG DR capsule Take 1 Capsule BY MOUTH EVERY DAY  0     lisinopril (PRINIVIL/ZESTRIL) 10 MG tablet Take 1 tablet (10 mg) by mouth daily (Patient not taking: Reported on 2021) 30 tablet 0     No Known  Allergies    Reviewed and updated as needed this visit by clinical staff   Allergies  Meds              Reviewed and updated as needed this visit by Provider                Past Medical History:   Diagnosis Date     Arthritis      High cholesterol      Hypertension      Malignant melanoma of left upper extremity including shoulder (H) 7/13/2018      Past Surgical History:   Procedure Laterality Date     COLONOSCOPY WITH CO2 INSUFFLATION N/A 2/19/2018    Procedure: COLONOSCOPY WITH CO2 INSUFFLATION;  COLON-SCREENING / ASHER ;  Surgeon: Brandon Ruby MD;  Location: [facility]     HC KNEE SCOPE,MED/LAT MENISCUS REPAIR  2003    right     PROPHYLAXIS RETINA DETACH,CRYO/DIATH  12/2009       Review of Systems  CONSTITUTIONAL: NEGATIVE for fever, chills, change in weight  INTEGUMENTARY/SKIN: NEGATIVE for worrisome rashes, moles or lesions  EYES: NEGATIVE for vision changes or irritation  ENT: NEGATIVE for ear, mouth and throat problems  RESP: NEGATIVE for significant cough or SOB  CV: NEGATIVE for chest pain, palpitations or peripheral edema  GI: NEGATIVE for nausea, abdominal pain, heartburn, or change in bowel habits   male: negative for dysuria, hematuria, decreased urinary stream, erectile dysfunction, urethral discharge  MUSCULOSKELETAL: NEGATIVE for significant arthralgias or myalgia  NEURO: NEGATIVE for weakness, dizziness or paresthesias  PSYCHIATRIC: NEGATIVE for changes in mood or affect    OBJECTIVE:   There were no vitals taken for this visit.    Physical Exam  GENERAL: healthy, alert and no distress  EYES: Eyes grossly normal to inspection, PERRL and conjunctivae and sclerae normal  HENT: ear canals and TM's normal, nose and mouth without ulcers or lesions  NECK: no adenopathy, no asymmetry, masses, or scars and thyroid normal to palpation  RESP: lungs clear to auscultation - no rales, rhonchi or wheezes  CV: regular rate and rhythm, normal S1 S2, no S3 or S4, no murmur, click or rub, no peripheral  edema and peripheral pulses strong  ABDOMEN: soft, nontender, no hepatosplenomegaly, no masses and bowel sounds normal  MS: no gross musculoskeletal defects noted, no edema  SKIN: no suspicious lesions or rashes  NEURO: Normal strength and tone, mentation intact and speech normal  PSYCH: mentation appears normal, affect normal/bright    Diagnostic Test Results:  Labs reviewed in Epic  Pending     ASSESSMENT/PLAN:   1. Encounter for routine adult physical exam with abnormal findings      2. Hyperlipidemia LDL goal <130  Labs pending   - atorvastatin (LIPITOR) 20 MG tablet; TAKE 1 TABLET BY MOUTH EVERY DAY  Dispense: 90 tablet; Refill: 3    3. GABE (generalized anxiety disorder)  doing well  Pt will like to Try to wean off meds  Discussed  How to slowly wean off  Call if any Problems  He will do this in spring  - FLUoxetine (PROZAC) 20 MG capsule; TAKE 1 CAPSULE BY MOUTH EVERY DAY  Dispense: 90 capsule; Refill: 1    4. Dysthymia  As above  Doing well  - FLUoxetine (PROZAC) 20 MG capsule; TAKE 1 CAPSULE BY MOUTH EVERY DAY  Dispense: 90 capsule; Refill: 1    5. Benign essential hypertension  Stable   - BASIC METABOLIC PANEL  - lisinopril (ZESTRIL) 5 MG tablet; Take 1 tablet (5 mg) by mouth daily  Dispense: 90 tablet; Refill: 3  - Lipid panel reflex to direct LDL Fasting    6. Alcohol abuse, in remission      7. Impaired glucose tolerance  Pending labs    8. Hearing disorder, unspecified laterality  Referral done  - AUDIOLOGY ADULT REFERRAL; Future    9. Screening for prostate cancer  Discussed   - PSA, screen    Patient has been advised of split billing requirements and indicates understanding: handouts  COUNSELING:   Reviewed preventive health counseling, as reflected in patient instructions       Regular exercise       Healthy diet/nutrition       Vision screening       Hearing screening       Colon cancer screening       Prostate cancer screening       The 10-year ASCVD risk score (Nakul GEMMA Jr., et al., 2013) is:  "7.5%    Values used to calculate the score:      Age: 60 years      Sex: Male      Is Non- : No      Diabetic: No      Tobacco smoker: No      Systolic Blood Pressure: 121 mmHg      Is BP treated: Yes      HDL Cholesterol: 46 mg/dL      Total Cholesterol: 166 mg/dL       Advance Care Planning    Estimated body mass index is 26.71 kg/m  as calculated from the following:    Height as of 12/15/20: 1.88 m (6' 2\").    Weight as of 12/15/20: 94.3 kg (208 lb).     Weight management plan: Discussed healthy diet and exercise guidelines    He reports that he quit smoking about 8 years ago. He has a 35.00 pack-year smoking history. He has never used smokeless tobacco.      Counseling Resources:  ATP IV Guidelines  Pooled Cohorts Equation Calculator  FRAX Risk Assessment  ICSI Preventive Guidelines  Dietary Guidelines for Americans, 2010  USDA's MyPlate  ASA Prophylaxis  Lung CA Screening    Kristen Uribe MD  [facility]  "

## 2021-07-08 PROBLEM — M25.521 BILATERAL ELBOW JOINT PAIN: Status: RESOLVED | Noted: 2021-01-05 | Resolved: 2021-07-08

## 2021-07-08 PROBLEM — M25.511 BILATERAL SHOULDER PAIN, UNSPECIFIED CHRONICITY: Status: RESOLVED | Noted: 2021-01-05 | Resolved: 2021-07-08

## 2021-07-08 PROBLEM — M25.512 BILATERAL SHOULDER PAIN, UNSPECIFIED CHRONICITY: Status: RESOLVED | Noted: 2021-01-05 | Resolved: 2021-07-08

## 2021-07-08 PROBLEM — M25.522 BILATERAL ELBOW JOINT PAIN: Status: RESOLVED | Noted: 2021-01-05 | Resolved: 2021-07-08

## 2021-07-08 NOTE — PROGRESS NOTES
Patient did not return to PT following initial evaluation on 1/5/21. Please let evaluation information serve as discharge information.

## 2021-07-15 ENCOUNTER — MYC MEDICAL ADVICE (OUTPATIENT)
Dept: FAMILY MEDICINE | Facility: CLINIC | Age: 61
End: 2021-07-15

## 2021-07-18 DIAGNOSIS — L30.9 ECZEMA, UNSPECIFIED TYPE: ICD-10-CM

## 2021-07-19 RX ORDER — MOMETASONE FUROATE 1 MG/G
CREAM TOPICAL
Qty: 45 G | Refills: 1 | Status: SHIPPED | OUTPATIENT
Start: 2021-07-19 | End: 2022-10-12

## 2021-09-25 ENCOUNTER — HEALTH MAINTENANCE LETTER (OUTPATIENT)
Age: 61
End: 2021-09-25

## 2021-11-09 ENCOUNTER — OFFICE VISIT (OUTPATIENT)
Dept: FAMILY MEDICINE | Facility: CLINIC | Age: 61
End: 2021-11-09
Payer: COMMERCIAL

## 2021-11-09 ENCOUNTER — ANCILLARY PROCEDURE (OUTPATIENT)
Dept: CARDIOLOGY | Facility: CLINIC | Age: 61
End: 2021-11-09
Attending: FAMILY MEDICINE
Payer: COMMERCIAL

## 2021-11-09 VITALS
TEMPERATURE: 98.7 F | OXYGEN SATURATION: 99 % | WEIGHT: 230 LBS | HEART RATE: 99 BPM | SYSTOLIC BLOOD PRESSURE: 136 MMHG | RESPIRATION RATE: 18 BRPM | DIASTOLIC BLOOD PRESSURE: 83 MMHG | HEIGHT: 74 IN | BODY MASS INDEX: 29.52 KG/M2

## 2021-11-09 DIAGNOSIS — I49.9 IRREGULAR HEART BEAT: ICD-10-CM

## 2021-11-09 DIAGNOSIS — F10.21 RECOVERING ALCOHOLIC IN REMISSION (H): ICD-10-CM

## 2021-11-09 DIAGNOSIS — I49.9 IRREGULAR HEART BEAT: Primary | ICD-10-CM

## 2021-11-09 LAB
ANION GAP SERPL CALCULATED.3IONS-SCNC: 5 MMOL/L (ref 3–14)
BUN SERPL-MCNC: 16 MG/DL (ref 7–30)
CALCIUM SERPL-MCNC: 9.1 MG/DL (ref 8.5–10.1)
CHLORIDE BLD-SCNC: 108 MMOL/L (ref 94–109)
CO2 SERPL-SCNC: 27 MMOL/L (ref 20–32)
CREAT SERPL-MCNC: 0.79 MG/DL (ref 0.66–1.25)
GFR SERPL CREATININE-BSD FRML MDRD: >90 ML/MIN/1.73M2
GLUCOSE BLD-MCNC: 86 MG/DL (ref 70–99)
HGB BLD-MCNC: 14.7 G/DL (ref 13.3–17.7)
POTASSIUM BLD-SCNC: 4.4 MMOL/L (ref 3.4–5.3)
SODIUM SERPL-SCNC: 140 MMOL/L (ref 133–144)
TSH SERPL DL<=0.005 MIU/L-ACNC: 1.82 MU/L (ref 0.4–4)

## 2021-11-09 PROCEDURE — 80048 BASIC METABOLIC PNL TOTAL CA: CPT | Performed by: FAMILY MEDICINE

## 2021-11-09 PROCEDURE — 93244 EXT ECG>48HR<7D REV&INTERPJ: CPT | Performed by: INTERNAL MEDICINE

## 2021-11-09 PROCEDURE — 36415 COLL VENOUS BLD VENIPUNCTURE: CPT | Performed by: FAMILY MEDICINE

## 2021-11-09 PROCEDURE — 93242 EXT ECG>48HR<7D RECORDING: CPT | Performed by: INTERNAL MEDICINE

## 2021-11-09 PROCEDURE — 85018 HEMOGLOBIN: CPT | Performed by: FAMILY MEDICINE

## 2021-11-09 PROCEDURE — 99214 OFFICE O/P EST MOD 30 MIN: CPT | Performed by: FAMILY MEDICINE

## 2021-11-09 PROCEDURE — 93000 ELECTROCARDIOGRAM COMPLETE: CPT | Performed by: FAMILY MEDICINE

## 2021-11-09 PROCEDURE — 84443 ASSAY THYROID STIM HORMONE: CPT | Performed by: FAMILY MEDICINE

## 2021-11-09 ASSESSMENT — PATIENT HEALTH QUESTIONNAIRE - PHQ9: 5. POOR APPETITE OR OVEREATING: NOT AT ALL

## 2021-11-09 ASSESSMENT — MIFFLIN-ST. JEOR: SCORE: 1928.02

## 2021-11-09 ASSESSMENT — ANXIETY QUESTIONNAIRES
6. BECOMING EASILY ANNOYED OR IRRITABLE: NOT AT ALL
GAD7 TOTAL SCORE: 0
3. WORRYING TOO MUCH ABOUT DIFFERENT THINGS: NOT AT ALL
1. FEELING NERVOUS, ANXIOUS, OR ON EDGE: NOT AT ALL
IF YOU CHECKED OFF ANY PROBLEMS ON THIS QUESTIONNAIRE, HOW DIFFICULT HAVE THESE PROBLEMS MADE IT FOR YOU TO DO YOUR WORK, TAKE CARE OF THINGS AT HOME, OR GET ALONG WITH OTHER PEOPLE: NOT DIFFICULT AT ALL
2. NOT BEING ABLE TO STOP OR CONTROL WORRYING: NOT AT ALL
5. BEING SO RESTLESS THAT IT IS HARD TO SIT STILL: NOT AT ALL
7. FEELING AFRAID AS IF SOMETHING AWFUL MIGHT HAPPEN: NOT AT ALL

## 2021-11-09 NOTE — PROGRESS NOTES
"  Assessment & Plan     Irregular heart beat  Pending zio and echo  Follow up if any symptoms  - EKG 12-lead complete w/read - Clinics  - Leadless EKG Monitor 3 to 7 Days; Future  - Echocardiogram Complete; Future  - TSH with free T4 reflex; Future  - Hemoglobin; Future  - Basic metabolic panel  (Ca, Cl, CO2, Creat, Gluc, K, Na, BUN); Future  - Basic metabolic panel  (Ca, Cl, CO2, Creat, Gluc, K, Na, BUN)  - Hemoglobin  - TSH with free T4 reflex    Recovering alcoholic in remission (H)  Since 2017  Pt declines flu and covid vaccine    Return if symptoms worsen or fail to improve, for recheck/Please schedule appointment.    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  Physical Exam   GENERAL: healthy, alert and no distress  NECK: no adenopathy, no asymmetry, masses, or scars and thyroid normal to palpation  RESP: lungs clear to auscultation - no rales, rhonchi or wheezes  CV: regular rate and rhythm, normal S1 S2, no S3 or S4, no murmur, click or rub, no peripheral edema and peripheral pulses strong  ABDOMEN: soft, nontender, no hepatosplenomegaly, no masses and bowel sounds normal  MS: no gross musculoskeletal defects noted, no edema    Pending   EKG-unchanged        "

## 2021-11-10 ASSESSMENT — ANXIETY QUESTIONNAIRES: GAD7 TOTAL SCORE: 0

## 2021-11-10 ASSESSMENT — PATIENT HEALTH QUESTIONNAIRE - PHQ9: SUM OF ALL RESPONSES TO PHQ QUESTIONS 1-9: 0

## 2021-11-16 ENCOUNTER — TRANSFERRED RECORDS (OUTPATIENT)
Dept: HEALTH INFORMATION MANAGEMENT | Facility: CLINIC | Age: 61
End: 2021-11-16
Payer: COMMERCIAL

## 2021-11-23 ENCOUNTER — MYC MEDICAL ADVICE (OUTPATIENT)
Dept: FAMILY MEDICINE | Facility: CLINIC | Age: 61
End: 2021-11-23
Payer: COMMERCIAL

## 2021-11-23 DIAGNOSIS — I47.29 PAROXYSMAL VENTRICULAR TACHYCARDIA (H): Primary | ICD-10-CM

## 2021-11-29 ENCOUNTER — ANCILLARY PROCEDURE (OUTPATIENT)
Dept: CARDIOLOGY | Facility: CLINIC | Age: 61
End: 2021-11-29
Attending: FAMILY MEDICINE
Payer: COMMERCIAL

## 2021-11-29 DIAGNOSIS — I49.9 IRREGULAR HEART BEAT: ICD-10-CM

## 2021-11-29 LAB — LVEF ECHO: NORMAL

## 2021-11-29 PROCEDURE — 93306 TTE W/DOPPLER COMPLETE: CPT | Performed by: STUDENT IN AN ORGANIZED HEALTH CARE EDUCATION/TRAINING PROGRAM

## 2021-12-06 ENCOUNTER — OFFICE VISIT (OUTPATIENT)
Dept: FAMILY MEDICINE | Facility: CLINIC | Age: 61
End: 2021-12-06
Payer: COMMERCIAL

## 2021-12-06 VITALS
HEART RATE: 99 BPM | SYSTOLIC BLOOD PRESSURE: 142 MMHG | OXYGEN SATURATION: 97 % | WEIGHT: 229.03 LBS | TEMPERATURE: 98.7 F | DIASTOLIC BLOOD PRESSURE: 96 MMHG | BODY MASS INDEX: 29.41 KG/M2

## 2021-12-06 DIAGNOSIS — Z20.822 ENCOUNTER FOR LABORATORY TESTING FOR COVID-19 VIRUS: ICD-10-CM

## 2021-12-06 DIAGNOSIS — Z01.818 PREOP GENERAL PHYSICAL EXAM: Primary | ICD-10-CM

## 2021-12-06 DIAGNOSIS — H33.21 RIGHT RETINAL DETACHMENT: ICD-10-CM

## 2021-12-06 DIAGNOSIS — I10 BENIGN ESSENTIAL HYPERTENSION: ICD-10-CM

## 2021-12-06 PROCEDURE — U0005 INFEC AGEN DETEC AMPLI PROBE: HCPCS | Performed by: PHYSICIAN ASSISTANT

## 2021-12-06 PROCEDURE — 99214 OFFICE O/P EST MOD 30 MIN: CPT | Performed by: PHYSICIAN ASSISTANT

## 2021-12-06 PROCEDURE — U0003 INFECTIOUS AGENT DETECTION BY NUCLEIC ACID (DNA OR RNA); SEVERE ACUTE RESPIRATORY SYNDROME CORONAVIRUS 2 (SARS-COV-2) (CORONAVIRUS DISEASE [COVID-19]), AMPLIFIED PROBE TECHNIQUE, MAKING USE OF HIGH THROUGHPUT TECHNOLOGIES AS DESCRIBED BY CMS-2020-01-R: HCPCS | Performed by: PHYSICIAN ASSISTANT

## 2021-12-06 ASSESSMENT — PAIN SCALES - GENERAL: PAINLEVEL: NO PAIN (0)

## 2021-12-06 NOTE — PATIENT INSTRUCTIONS

## 2021-12-06 NOTE — PROGRESS NOTES
[facility]  [address]  [place] [address]  Phone: [phone]  Primary Provider: Kristen Uribe  Pre-op Performing Provider: MI CHAO      PREOPERATIVE EVALUATION:  Today's date: 12/6/2021    Kody Salas is a 61 year old male who presents for a preoperative evaluation.    Surgical Information:  Surgery/Procedure: RIGHT VITRECTOMY POSTERIOR 25 GAUGE SYSTEM, MEMBRANE PEEL, AIR FLUID EXCHANGE  Surgery Location: [facility]  Surgeon: Dr. Villegas   Surgery Date: 12/9/21  Time of Surgery: N/A- morning  Where patient plans to recover: At home with family  Fax number for surgical facility: [phone] or [phone]    Type of Anesthesia Anticipated: to be determined    Assessment & Plan     The proposed surgical procedure is considered INTERMEDIATE risk.    Preop general physical exam  Encounter for laboratory testing for COVID-19 virus  Right retinal detachment  Benign essential hypertension  - Asymptomatic COVID-19 Virus (Coronavirus) by PCR Nose    Patient just reduced lisinopril from 10 mg to 5 mg as it had been very controlled. He has since gained some weight. Is planning to work on weight loss - following up with PCP in 2 months for his annual physical.       Risks and Recommendations:  The patient has the following additional risks and recommendations for perioperative complications:   - No identified additional risk factors other than previously addressed    Medication Instructions:   - ACE/ARB: May be continued on the day of surgery.     RECOMMENDATION:  APPROVAL GIVEN to proceed with proposed procedure, without further diagnostic evaluation.            Subjective     HPI related to upcoming procedure: Seeing retinal specialist for ongoing care after retinal detachment 2016     Preop Questions 12/6/2021   1. Have you ever had a heart attack or stroke? No   2. Have you ever had surgery on your heart or blood vessels,  such as a stent placement, a coronary artery bypass, or surgery on an artery in your head, neck, heart, or legs? No   3. Do you have chest pain with activity? No   4. Do you have a history of  heart failure? No   5. Do you currently have a cold, bronchitis or symptoms of other infection? No   6. Do you have a cough, shortness of breath, or wheezing? No   7. Do you or anyone in your family have previous history of blood clots? No   8. Do you or does anyone in your family have a serious bleeding problem such as prolonged bleeding following surgeries or cuts? No   9. Have you ever had problems with anemia or been told to take iron pills? No   10. Have you had any abnormal blood loss such as black, tarry or bloody stools? No   11. Have you ever had a blood transfusion? No   12. Are you willing to have a blood transfusion if it is medically needed before, during, or after your surgery? Yes   13. Have you or any of your relatives ever had problems with anesthesia? No   14. Do you have sleep apnea, excessive snoring or daytime drowsiness? No   15. Do you have any artifical heart valves or other implanted medical devices like a pacemaker, defibrillator, or continuous glucose monitor? No   16. Do you have artificial joints? No   17. Are you allergic to latex? No       Health Care Directive:  Patient does not have a Health Care Directive or Living Will: Discussed advance care planning with patient; however, patient declined at this time.    Preoperative Review of :   reviewed - no record of controlled substances prescribed.          Review of Systems  CONSTITUTIONAL: NEGATIVE for fever, chills, change in weight  INTEGUMENTARY/SKIN: NEGATIVE for worrisome rashes, moles or lesions  EYES: NEGATIVE for vision changes or irritation  ENT/MOUTH: NEGATIVE for ear, mouth and throat problems  RESP: NEGATIVE for significant cough or SOB  CV: NEGATIVE for chest pain, palpitations or peripheral edema  GI: NEGATIVE for nausea,  abdominal pain, heartburn, or change in bowel habits  : NEGATIVE for frequency, dysuria, or hematuria  MUSCULOSKELETAL: NEGATIVE for significant arthralgias or myalgia  NEURO: NEGATIVE for weakness, dizziness or paresthesias  ENDOCRINE: NEGATIVE for temperature intolerance, skin/hair changes  HEME: NEGATIVE for bleeding problems  PSYCHIATRIC: NEGATIVE for changes in mood or affect    Patient Active Problem List    Diagnosis Date Noted     Recovering alcoholic in remission (H) 11/09/2021     Priority: Medium     Alcohol abuse, in remission 01/13/2020     Priority: Medium     Malignant melanoma of left upper extremity including shoulder (H) 07/13/2018     Priority: Medium     Diagnosed Feburary 2018-sees Dr Ebstein at [facility] Anxiety 08/02/2016     Priority: Medium     Impaired glucose tolerance 01/06/2016     Priority: Medium     Chronic back pain 01/08/2015     Priority: Medium     History of ETOH abuse 01/07/2014     Priority: Medium     Sober   Since December 5th 2017       Obesity 11/27/2013     Priority: Medium     GERD (gastroesophageal reflux disease) 11/13/2012     Priority: Medium     Eczema 11/13/2012     Priority: Medium     Hypertension goal BP (blood pressure) < 140/90 07/17/2012     Priority: Medium     GABE (generalized anxiety disorder) 07/17/2012     Priority: Medium     Hyperlipidemia LDL goal <130 04/23/2010     Priority: Medium     Seasonal allergies 04/23/2010     Priority: Medium      Past Medical History:   Diagnosis Date     Arthritis      High cholesterol      Hypertension      Malignant melanoma of left upper extremity including shoulder (H) 7/13/2018     Past Surgical History:   Procedure Laterality Date     COLONOSCOPY WITH CO2 INSUFFLATION N/A 2/19/2018    Procedure: COLONOSCOPY WITH CO2 INSUFFLATION;  COLON-SCREENING / ASHER ;  Surgeon: Brandon Ruby MD;  Location:  OR      KNEE SCOPE,MED/LAT MENISCUS REPAIR  2003    right     PROPHYLAXIS RETINA  DETACH,CRYO/DIATH  2009     Current Outpatient Medications   Medication Sig Dispense Refill     atorvastatin (LIPITOR) 20 MG tablet TAKE 1 TABLET BY MOUTH EVERY DAY 90 tablet 3     lisinopril (ZESTRIL) 5 MG tablet Take 1 tablet (5 mg) by mouth daily 90 tablet 3     mometasone (ELOCON) 0.1 % external cream APPLY TO AFFECTED AREA DAILY AS DIRECTED 45 g 1     omeprazole (PRILOSEC) 20 MG DR capsule Take 1 Capsule BY MOUTH EVERY DAY  0       No Known Allergies     Social History     Tobacco Use     Smoking status: Former Smoker     Packs/day: 1.00     Years: 35.00     Pack years: 35.00     Quit date: 6/15/2012     Years since quittin.4     Smokeless tobacco: Never Used   Substance Use Topics     Alcohol use: No     Comment: quit 2015       History   Drug Use No         Objective     BP (!) 142/96   Pulse 99   Temp 98.7  F (37.1  C) (Oral)   Wt 103.9 kg (229 lb 0.4 oz)   SpO2 97%   BMI 29.41 kg/m      Physical Exam    GENERAL APPEARANCE: healthy, alert and no distress     EYES: EOMI,  PERRL     HENT: ear canals and TM's normal and nose and mouth without ulcers or lesions     NECK: no adenopathy, no asymmetry, masses, or scars and thyroid normal to palpation     RESP: lungs clear to auscultation - no rales, rhonchi or wheezes     CV: regular rates and rhythm, normal S1 S2, no S3 or S4 and no murmur, click or rub     ABDOMEN:  soft, nontender, no HSM or masses and bowel sounds normal     MS: extremities normal- no gross deformities noted, no evidence of inflammation in joints, FROM in all extremities.     SKIN: no suspicious lesions or rashes     NEURO: Normal strength and tone, sensory exam grossly normal, mentation intact and speech normal     PSYCH: mentation appears normal. and affect normal/bright     LYMPHATICS: No cervical adenopathy    Recent Labs   Lab Test 21  1322 21  0852   HGB 14.7  --     140   POTASSIUM 4.4 4.7   CR 0.79 0.75   A1C  --  5.7*      Diagnostics:  No labs  were ordered during this visit.   No EKG required, no history of coronary heart disease, significant arrhythmia, peripheral arterial disease or other structural heart disease.    Revised Cardiac Risk Index (RCRI):  The patient has the following serious cardiovascular risks for perioperative complications:   - No serious cardiac risks = 0 points     RCRI Interpretation: 0 points: Class I (very low risk - 0.4% complication rate)       Signed Electronically by: Elizabeth Mcwilliams PA-C  Copy of this evaluation report is provided to requesting physician.

## 2021-12-07 LAB — SARS-COV-2 RNA RESP QL NAA+PROBE: NEGATIVE

## 2021-12-28 NOTE — PROGRESS NOTES
[facility]  CARDIOVASCULAR MEDICINE CLINIC NOTE    Referring Provider: Austin Corey   Primary Care Provider: Kristen Uribe     Patient Name: Kody Salas   MRN: [id number]       Chief complaint: - high PVC burden    HPI:   Kody Salas is a 61 year old male with a pmhx sig for HTN, HLP GI reflux who presents for further evaluation of an abnormal zio patch.     Last march he went to get a covid test and they found his heart rate to be in the 30's and then when he had his physical his HR was irregular he saw his PCP who ordered a  zio patch that showed a PVC burden of 16.7% they look mostly monomorphic and 3 runs of vt lasting max 4 beats. His echocardiogram showed a normal EF 55-60% with normal RV function no valvular abnormalities and no pericardial effusion. He feels fine he does not notice an irregular heart beat he has never passed out, chest pain, dyspnea at rest or with exertion, PND, orthopnea, peripheral edema. He quit drinking in march in 2019 and his lisinopril was cut back to 5mg but has had increased weight and is not exercising much this past year and his bp has crept up as well as his bp in the 140/70-90's     Regarding his labs his last LDL was 108  HDL 51 - his atorvastatin was started       Current cardiac meds  Atorvastatin 20mg d  Lisinopril 5mg d    CURRENT MEDICATIONS:  Current Outpatient Medications   Medication Sig Dispense Refill     atorvastatin (LIPITOR) 20 MG tablet TAKE 1 TABLET BY MOUTH EVERY DAY 90 tablet 3     lisinopril (ZESTRIL) 5 MG tablet Take 1 tablet (5 mg) by mouth daily 90 tablet 3     mometasone (ELOCON) 0.1 % external cream APPLY TO AFFECTED AREA DAILY AS DIRECTED 45 g 1     omeprazole (PRILOSEC) 20 MG DR capsule Take 1 Capsule BY MOUTH EVERY DAY  0       PAST MEDICAL HISTORY:  Past Medical History:   Diagnosis Date     Arthritis      High cholesterol      Hypertension      Malignant melanoma of left upper extremity including  shoulder (H) 2018       PAST SURGICAL HISTORY:  Past Surgical History:   Procedure Laterality Date     COLONOSCOPY WITH CO2 INSUFFLATION N/A 2018    Procedure: COLONOSCOPY WITH CO2 INSUFFLATION;  COLON-SCREENING / ASHER ;  Surgeon: Brandon Ruby MD;  Location: [facility] OR     HC KNEE SCOPE,MED/LAT MENISCUS REPAIR      right     PROPHYLAXIS RETINA DETACH,CRYO/DIATH  2009       ALLERGIES:   No Known Allergies    FAMILY HISTORY:  Family History   Problem Relation Age of Onset     Coronary Artery Disease Mother      Hypertension Mother      Hypertension Father      Coronary Artery Disease Brother         MI age 45     Hypertension Brother      Hypertension Maternal Grandmother      Hypertension Maternal Grandfather      Hypertension Paternal Grandmother      Hypertension Paternal Grandfather      Heart Disease Paternal Grandfather      Anesthesia Reaction No family hx of      No family history of premature CAD or sudden death.    SOCIAL HISTORY:  Social History     Tobacco Use     Smoking status: Former Smoker     Packs/day: 1.00     Years: 35.00     Pack years: 35.00     Quit date: 6/15/2012     Years since quittin.5     Smokeless tobacco: Never Used   Substance Use Topics     Alcohol use: No     Comment: quit 2015     Drug use: No       ROS:   A comprehensive 14 point review of systems is negative other than as mentioned in HPI.    Exam:  BP (!) 142/70 (BP Location: Right arm, Patient Position: Sitting, Cuff Size: Adult Large)   Pulse (!) 40   Wt 104.3 kg (230 lb)   SpO2 98%   BMI 29.53 kg/m    Body mass index is 29.53 kg/m .  Wt Readings from Last 2 Encounters:   21 104.3 kg (230 lb)   21 103.9 kg (229 lb 0.4 oz)     Constitutional: no acute distress, pleasant and cooperative, appears overall well.  Eyes:sclera white, conjunctiva clear, without icterus or pallor   Ears/Nose/Mouth/Throat: mucosa moist, no central cyanosis, Nares patent b/l, moist mucous  membranes  Cardiovascular: he is in bigeminy,  nl S1S2, JVP not elevated, extremities with no edema or cyanosis  Respiratory: clear to auscultation bilaterally -no rales, rhonchi or wheezes, no use of accessory muscles, no retractions, respirations are unlabored, normal respiratory rate  Gastrointestinal: soft, nontender, non distended, no hepatosplenomegaly or masses  Musculoskeletal: normal muscle bulk and tone, joints   Skin: normal skin appearance without worrisome lesions.   Neurologic: Alert and oriented, face symmetric, normal gait  Psychiatric: appropriate affect, cooperative        Labs:  Reviewed.   Recent Labs   Lab Test 11/09/21  1322 02/01/21  0852    140   POTASSIUM 4.4 4.7   CHLORIDE 108 108   CO2 27 31   BUN 16 15   CR 0.79 0.75     CBC RESULTS:   Recent Labs   Lab Test 11/09/21  1322   HGB 14.7     Cholesterol   Date Value Ref Range Status   02/01/2021 180 <200 mg/dL Final   01/13/2020 166 <200 mg/dL Final     HDL Cholesterol   Date Value Ref Range Status   02/01/2021 51 >39 mg/dL Final   01/13/2020 46 >39 mg/dL Final     LDL Cholesterol Calculated   Date Value Ref Range Status   02/01/2021 108 (H) <100 mg/dL Final     Comment:     Above desirable:  100-129 mg/dl  Borderline High:  130-159 mg/dL  High:             160-189 mg/dL  Very high:       >189 mg/dl     01/13/2020 96 <100 mg/dL Final     Comment:     Desirable:       <100 mg/dl     Triglycerides   Date Value Ref Range Status   02/01/2021 103 <150 mg/dL Final   01/13/2020 121 <150 mg/dL Final     Comment:     Fasting specimen     Cholesterol/HDL Ratio   Date Value Ref Range Status   09/25/2014 3.3 0.0 - 5.0 Final   11/27/2013 3.3 0.0 - 5.0 Final     No results found for: INR    TSH   Date Value Ref Range Status   11/09/2021 1.82 0.40 - 4.00 mU/L Final   02/03/2017 3.17 0.40 - 4.00 mU/L Final     Recent Labs   Lab Test 02/01/21  0852 02/07/18  0947   A1C 5.7* 5.5       Testing/Procedures:  I personally reviewed all the following  information:    EKG (Date 11/2019): NSR with RBBB and nonspecific repolarization abnormality    TTE (Date 11/2021):  Global and regional left ventricular function is normal with an EF of 55-60%.  Global right ventricular function is normal. The right ventricle is normal  size.  No significant valvular abnormalities.  No pericardial effusion is present.  There is no prior study for direct comparison.      Zio (Date 11/2021):    Assessment and Plan:   Kody Salas is a 61 year old male with history of HTN, HLP GI reflux who presents for further evaluation of an abnormal zio patch    PVC  - burden of 16%   - normal EF and asymptomatic but quite frequent currently in [facility] we discussed the options and he is interested in an ablation,he has episodes of HR in the 40's in his zio patch even when he does not have PVC;s therefore I'm hesitant to prescribe a bb  - referral placed to EP     HTN  - uncontrolled in the past two visits   - increase lisinopril to 10mg/d      -we discussed recommendation of 150 min moderate intensity exercise /week   - discussed the need for heart healthy diet including a diet rich in fruits, vegetables and fiber and low on carbonated beverages sugar  -discussed recommendation of moderation of alcohol, salt and NSAIDs    >50% of this 35 minute visit were spent with the patient and  family on in-person counseling and discussion regarding the above issues, the remainder of the time was spent in chart review, documentation, ordering and communication with other providers.    The patient states understanding and is agreeable with plan.     Natacha Sofia MD  Cardiology    CC  JOSE HERNANDEZ

## 2021-12-29 ENCOUNTER — OFFICE VISIT (OUTPATIENT)
Dept: CARDIOLOGY | Facility: CLINIC | Age: 61
End: 2021-12-29
Attending: INTERNAL MEDICINE
Payer: COMMERCIAL

## 2021-12-29 VITALS
DIASTOLIC BLOOD PRESSURE: 70 MMHG | HEART RATE: 40 BPM | BODY MASS INDEX: 29.53 KG/M2 | WEIGHT: 230 LBS | OXYGEN SATURATION: 98 % | SYSTOLIC BLOOD PRESSURE: 142 MMHG

## 2021-12-29 DIAGNOSIS — I10 BENIGN ESSENTIAL HYPERTENSION: ICD-10-CM

## 2021-12-29 DIAGNOSIS — I47.29 PAROXYSMAL VENTRICULAR TACHYCARDIA (H): ICD-10-CM

## 2021-12-29 DIAGNOSIS — I49.3 PVC'S (PREMATURE VENTRICULAR CONTRACTIONS): Primary | ICD-10-CM

## 2021-12-29 PROCEDURE — 99204 OFFICE O/P NEW MOD 45 MIN: CPT | Performed by: STUDENT IN AN ORGANIZED HEALTH CARE EDUCATION/TRAINING PROGRAM

## 2021-12-29 RX ORDER — LISINOPRIL 10 MG/1
10 TABLET ORAL DAILY
Qty: 90 TABLET | Refills: 3 | Status: SHIPPED | OUTPATIENT
Start: 2021-12-29 | End: 2022-05-09

## 2021-12-29 NOTE — LETTER
12/29/2021      RE: Kody Salas  [address]  [place] [address]       Dear Colleague,    Thank you for the opportunity to participate in the care of your patient, Kody Salas, at the [facility]. Please see a copy of my visit note below.    [facility]  CARDIOVASCULAR MEDICINE CLINIC NOTE    Referring Provider: Austin Corey   Primary Care Provider: Kristen Uribe     Patient Name: Kody Salas   MRN: [id number]       Chief complaint: - high PVC burden    HPI:   Kody Salas is a 61 year old male with a pmhx sig for HTN, HLP GI reflux who presents for further evaluation of an abnormal zio patch.     Last march he went to get a covid test and they found his heart rate to be in the 30's and then when he had his physical his HR was irregular he saw his PCP who ordered a  zio patch that showed a PVC burden of 16.7% they look mostly monomorphic and 3 runs of vt lasting max 4 beats. His echocardiogram showed a normal EF 55-60% with normal RV function no valvular abnormalities and no pericardial effusion. He feels fine he does not notice an irregular heart beat he has never passed out, chest pain, dyspnea at rest or with exertion, PND, orthopnea, peripheral edema. He quit drinking in march in 2019 and his lisinopril was cut back to 5mg but has had increased weight and is not exercising much this past year and his bp has crept up as well as his bp in the 140/70-90's     Regarding his labs his last LDL was 108  HDL 51 - his atorvastatin was started       Current cardiac meds  Atorvastatin 20mg d  Lisinopril 5mg d    CURRENT MEDICATIONS:  Current Outpatient Medications   Medication Sig Dispense Refill     atorvastatin (LIPITOR) 20 MG tablet TAKE 1 TABLET BY MOUTH EVERY DAY 90 tablet 3     lisinopril (ZESTRIL) 5 MG tablet Take 1 tablet (5 mg) by mouth daily 90 tablet 3      mometasone (ELOCON) 0.1 % external cream APPLY TO AFFECTED AREA DAILY AS DIRECTED 45 g 1     omeprazole (PRILOSEC) 20 MG DR capsule Take 1 Capsule BY MOUTH EVERY DAY  0       PAST MEDICAL HISTORY:  Past Medical History:   Diagnosis Date     Arthritis      High cholesterol      Hypertension      Malignant melanoma of left upper extremity including shoulder (H) 2018       PAST SURGICAL HISTORY:  Past Surgical History:   Procedure Laterality Date     COLONOSCOPY WITH CO2 INSUFFLATION N/A 2018    Procedure: COLONOSCOPY WITH CO2 INSUFFLATION;  COLON-SCREENING / ASHER ;  Surgeon: Brandon Ruby MD;  Location: [facility] OR     HC KNEE SCOPE,MED/LAT MENISCUS REPAIR      right     PROPHYLAXIS RETINA DETACH,CRYO/DIATH  2009       ALLERGIES:   No Known Allergies    FAMILY HISTORY:  Family History   Problem Relation Age of Onset     Coronary Artery Disease Mother      Hypertension Mother      Hypertension Father      Coronary Artery Disease Brother         MI age 45     Hypertension Brother      Hypertension Maternal Grandmother      Hypertension Maternal Grandfather      Hypertension Paternal Grandmother      Hypertension Paternal Grandfather      Heart Disease Paternal Grandfather      Anesthesia Reaction No family hx of      No family history of premature CAD or sudden death.    SOCIAL HISTORY:  Social History     Tobacco Use     Smoking status: Former Smoker     Packs/day: 1.00     Years: 35.00     Pack years: 35.00     Quit date: 6/15/2012     Years since quittin.5     Smokeless tobacco: Never Used   Substance Use Topics     Alcohol use: No     Comment: quit 2015     Drug use: No       ROS:   A comprehensive 14 point review of systems is negative other than as mentioned in HPI.    Exam:  BP (!) 142/70 (BP Location: Right arm, Patient Position: Sitting, Cuff Size: Adult Large)   Pulse (!) 40   Wt 104.3 kg (230 lb)   SpO2 98%   BMI 29.53 kg/m    Body mass index is 29.53 kg/m .  Wt Readings  from Last 2 Encounters:   12/29/21 104.3 kg (230 lb)   12/06/21 103.9 kg (229 lb 0.4 oz)     Constitutional: no acute distress, pleasant and cooperative, appears overall well.  Eyes:sclera white, conjunctiva clear, without icterus or pallor   Ears/Nose/Mouth/Throat: mucosa moist, no central cyanosis, Nares patent b/l, moist mucous membranes  Cardiovascular: he is in bigeminy,  nl S1S2, JVP not elevated, extremities with no edema or cyanosis  Respiratory: clear to auscultation bilaterally -no rales, rhonchi or wheezes, no use of accessory muscles, no retractions, respirations are unlabored, normal respiratory rate  Gastrointestinal: soft, nontender, non distended, no hepatosplenomegaly or masses  Musculoskeletal: normal muscle bulk and tone, joints   Skin: normal skin appearance without worrisome lesions.   Neurologic: Alert and oriented, face symmetric, normal gait  Psychiatric: appropriate affect, cooperative        Labs:  Reviewed.   Recent Labs   Lab Test 11/09/21  1322 02/01/21  0852    140   POTASSIUM 4.4 4.7   CHLORIDE 108 108   CO2 27 31   BUN 16 15   CR 0.79 0.75     CBC RESULTS:   Recent Labs   Lab Test 11/09/21  1322   HGB 14.7     Cholesterol   Date Value Ref Range Status   02/01/2021 180 <200 mg/dL Final   01/13/2020 166 <200 mg/dL Final     HDL Cholesterol   Date Value Ref Range Status   02/01/2021 51 >39 mg/dL Final   01/13/2020 46 >39 mg/dL Final     LDL Cholesterol Calculated   Date Value Ref Range Status   02/01/2021 108 (H) <100 mg/dL Final     Comment:     Above desirable:  100-129 mg/dl  Borderline High:  130-159 mg/dL  High:             160-189 mg/dL  Very high:       >189 mg/dl     01/13/2020 96 <100 mg/dL Final     Comment:     Desirable:       <100 mg/dl     Triglycerides   Date Value Ref Range Status   02/01/2021 103 <150 mg/dL Final   01/13/2020 121 <150 mg/dL Final     Comment:     Fasting specimen     Cholesterol/HDL Ratio   Date Value Ref Range Status   09/25/2014 3.3 0.0 - 5.0  Final   11/27/2013 3.3 0.0 - 5.0 Final     No results found for: INR    TSH   Date Value Ref Range Status   11/09/2021 1.82 0.40 - 4.00 mU/L Final   02/03/2017 3.17 0.40 - 4.00 mU/L Final     Recent Labs   Lab Test 02/01/21  0852 02/07/18  0947   A1C 5.7* 5.5       Testing/Procedures:  I personally reviewed all the following information:    EKG (Date 11/2019): NSR with RBBB and nonspecific repolarization abnormality    TTE (Date 11/2021):  Global and regional left ventricular function is normal with an EF of 55-60%.  Global right ventricular function is normal. The right ventricle is normal  size.  No significant valvular abnormalities.  No pericardial effusion is present.  There is no prior study for direct comparison.      Zio (Date 11/2021):    Assessment and Plan:   Kody Salas is a 61 year old male with history of HTN, HLP GI reflux who presents for further evaluation of an abnormal zio patch    PVC  - burden of 16%   - normal EF and asymptomatic but quite frequent currently in [facility] we discussed the options and he is interested in an ablation,he has episodes of HR in the 40's in his zio patch even when he does not have PVC;s therefore I'm hesitant to prescribe a bb  - referral placed to EP     HTN  - uncontrolled in the past two visits   - increase lisinopril to 10mg/d      -we discussed recommendation of 150 min moderate intensity exercise /week   - discussed the need for heart healthy diet including a diet rich in fruits, vegetables and fiber and low on carbonated beverages sugar  -discussed recommendation of moderation of alcohol, salt and NSAIDs    >50% of this 35 minute visit were spent with the patient and  family on in-person counseling and discussion regarding the above issues, the remainder of the time was spent in chart review, documentation, ordering and communication with other providers.    The patient states understanding and is agreeable with plan.     Natacha Sofia,  MD  Cardiology    CC  JO[facility] HERNANDEZ

## 2021-12-29 NOTE — PATIENT INSTRUCTIONS
Thank you for coming to the [facility] [place]; please note the following instructions:    1. Consultation with Electrophysiologist Cardiologist that specializes in PVC's.  Dr. Morel    2.  Increase lisinopril to 10 mg daily. A new prescription has been sent to your preferred pharmacy.        If you have any questions regarding your visit please contact your care team:     Cardiology  Telephone Number   Lia H., RN  Jodie MARTINEZ,RN  Amira JALLOH, RADHA BRENNER, MA  Anish HENAO LPN   [phone]   (select option 1)    *After hours: [phone]     For scheduling appts:     [phone] or    [phone] (select option 1)    *After hours: [phone]     For the Device Clinic (Pacemakers and ICD's)  RN's :  Naya Meredith   During business hours: [phone]    *After business hours:  [phone] (select option 4)      Normal test result notifications will be released via [employer] or mailed within 7 business days.  All other test results, will be communicated via telephone once reviewed by your cardiologist.    If you need a medication refill please contact your pharmacy.  Please allow 3 business days for your refill to be completed.    As always, thank you for trusting us with your health care needs!

## 2021-12-29 NOTE — NURSING NOTE
"Chief Complaint   Patient presents with     Palpitations     per patient lightheaded occationally       Initial BP (!) 142/70 (BP Location: Right arm, Patient Position: Sitting, Cuff Size: Adult Large)   Pulse (!) 40   Wt 104.3 kg (230 lb)   SpO2 98%   BMI 29.53 kg/m   Estimated body mass index is 29.53 kg/m  as calculated from the following:    Height as of 11/9/21: 1.88 m (6' 2\").    Weight as of this encounter: 104.3 kg (230 lb)..  BP completed using cuff size: large    Anish oJ L.P.N.    "

## 2022-01-20 ASSESSMENT — ENCOUNTER SYMPTOMS
LEG PAIN: 0
PALPITATIONS: 1
HYPERTENSION: 0
EXERCISE INTOLERANCE: 0
HYPOTENSION: 0
SYNCOPE: 0
ORTHOPNEA: 0
LIGHT-HEADEDNESS: 1
SLEEP DISTURBANCES DUE TO BREATHING: 0

## 2022-01-21 ENCOUNTER — OFFICE VISIT (OUTPATIENT)
Dept: CARDIOLOGY | Facility: CLINIC | Age: 62
End: 2022-01-21
Attending: STUDENT IN AN ORGANIZED HEALTH CARE EDUCATION/TRAINING PROGRAM
Payer: COMMERCIAL

## 2022-01-21 VITALS
WEIGHT: 233.1 LBS | HEIGHT: 74 IN | DIASTOLIC BLOOD PRESSURE: 91 MMHG | HEART RATE: 45 BPM | BODY MASS INDEX: 29.91 KG/M2 | OXYGEN SATURATION: 97 % | SYSTOLIC BLOOD PRESSURE: 129 MMHG

## 2022-01-21 DIAGNOSIS — I49.3 PVC'S (PREMATURE VENTRICULAR CONTRACTIONS): Primary | ICD-10-CM

## 2022-01-21 PROCEDURE — 93005 ELECTROCARDIOGRAM TRACING: CPT

## 2022-01-21 PROCEDURE — G0463 HOSPITAL OUTPT CLINIC VISIT: HCPCS | Mod: 25

## 2022-01-21 PROCEDURE — 99215 OFFICE O/P EST HI 40 MIN: CPT | Performed by: INTERNAL MEDICINE

## 2022-01-21 RX ORDER — LIDOCAINE 40 MG/G
CREAM TOPICAL
Status: CANCELLED | OUTPATIENT
Start: 2022-01-21

## 2022-01-21 RX ORDER — SODIUM CHLORIDE 9 MG/ML
INJECTION, SOLUTION INTRAVENOUS CONTINUOUS
Status: CANCELLED | OUTPATIENT
Start: 2022-01-21

## 2022-01-21 ASSESSMENT — MIFFLIN-ST. JEOR: SCORE: 1937.08

## 2022-01-21 ASSESSMENT — PAIN SCALES - GENERAL: PAINLEVEL: NO PAIN (0)

## 2022-01-21 NOTE — LETTER
1/21/2022      RE: Kody Salas  [address] [facility]  [place] [address]       Dear Colleague,    Thank you for the opportunity to participate in the care of your patient, Kody Salas, at the [facility] [place] [facility]. Please see a copy of my visit note below.    HPI:   Kody Salas is a 61 year old male with a pmhx of HTN, HLD, GI reflux and frequent PVCs.  He was referred by Dr. Brown to consider whether he might be a candidate for catheter ablation of PVCs.  He was diagnosed with PVCs in 3/2021.  He is less symptomatic with PVCs.  He denied any chest pain or SOB and complained of lightheadedness.  He is on Atorvastatin 20mg daily and Lisinopril 5mg daily.    PAST MEDICAL HISTORY:  Past Medical History:   Diagnosis Date     Arthritis      High cholesterol      Hypertension      Malignant melanoma of left upper extremity including shoulder (H) 7/13/2018       CURRENT MEDICATIONS:  Current Outpatient Medications   Medication Sig Dispense Refill     atorvastatin (LIPITOR) 20 MG tablet TAKE 1 TABLET BY MOUTH EVERY DAY 90 tablet 3     lisinopril (ZESTRIL) 10 MG tablet Take 1 tablet (10 mg) by mouth daily 90 tablet 3     mometasone (ELOCON) 0.1 % external cream APPLY TO AFFECTED AREA DAILY AS DIRECTED 45 g 1     omeprazole (PRILOSEC) 20 MG DR capsule Take 1 Capsule BY MOUTH EVERY DAY  0       PAST SURGICAL HISTORY:  Past Surgical History:   Procedure Laterality Date     COLONOSCOPY WITH CO2 INSUFFLATION N/A 2/19/2018    Procedure: COLONOSCOPY WITH CO2 INSUFFLATION;  COLON-SCREENING / ASHER ;  Surgeon: Brandon Ruby MD;  Location:  OR      KNEE SCOPE,MED/LAT MENISCUS REPAIR  2003    right     PROPHYLAXIS RETINA DETACH,CRYO/DIATH  12/2009       ALLERGIES:   No Known Allergies    FAMILY HISTORY:  + Premature coronary artery disease  - Atrial fibrillation  + Sudden cardiac death     SOCIAL HISTORY:  Social  "History     Tobacco Use     Smoking status: Former Smoker     Packs/day: 1.00     Years: 35.00     Pack years: 35.00     Quit date: 6/15/2012     Years since quittin.6     Smokeless tobacco: Never Used   Substance Use Topics     Alcohol use: No     Comment: quit 2015     Drug use: No       ROS:   Answers for HPI/ROS submitted by the patient on 2022 were reviewed.  Constitutional: No fever, chills, or sweats. Weight stable.   ENT: No visual disturbance, ear ache, epistaxis, sore throat.   Cardiovascular: As per HPI.   Respiratory: No cough, hemoptysis.    GI: No nausea, vomiting, hematemesis, melena, or hematochezia.   : No hematuria.   Integument: Negative.   Psychiatric: Negative.   Hematologic:  Easy bruising, no easy bleeding.  Neuro: Negative.   Endocrinology: No significant heat or cold intolerance   Musculoskeletal: No myalgia.    Exam:  BP (!) 129/91 (BP Location: Right arm, Patient Position: Chair, Cuff Size: Adult Regular)   Pulse (!) 45   Ht 1.88 m (6' 2\")   Wt 105.7 kg (233 lb 1.6 oz)   SpO2 97%   BMI 29.93 kg/m    GENERAL APPEARANCE: healthy, alert and no distress  HEENT: no icterus, no xanthelasmas, normal pupil size and reaction, normal palate, mucosa moist, no central cyanosis  NECK: no adenopathy, no asymmetry, masses, or scars, thyroid normal to palpation and no bruits, JVP not elevated  RESPIRATORY: lungs clear to auscultation - no rales, rhonchi or wheezes, no use of accessory muscles, no retractions, respirations are unlabored, normal respiratory rate  CARDIOVASCULAR: regular rhythm, normal S1 with physiologic split S2, no S3 or S4 and no murmur, click or rub, precordium quiet with normal PMI.  ABDOMEN: soft, non tender, without hepatosplenomegaly, no masses palpable, bowel sounds normal, aorta not enlarged by palpation, no abdominal bruits  EXTREMITIES: peripheral pulses normal, no edema, no bruits  NEURO: alert and oriented to person/place/time, normal speech, gait and " affect  VASC: Radial, femoral, dorsalis pedis and posterior tibialis pulses are normal in volumes and symmetric bilaterally. No bruits are heard.  SKIN: no ecchymoses, no rashes    Labs:  CBC RESULTS:   Lab Results   Component Value Date    WBC 8.8 04/12/2013    RBC 5.85 04/12/2013    HGB 14.7 11/09/2021    HGB 16.8 04/12/2013    HCT 51.5 04/12/2013    MCV 88 04/12/2013    MCH 28.6 04/12/2013    MCHC 32.5 04/12/2013    RDW 13.7 04/12/2013     04/12/2013       BMP RESULTS:  Lab Results   Component Value Date     11/09/2021     02/01/2021    POTASSIUM 4.4 11/09/2021    POTASSIUM 4.7 02/01/2021    CHLORIDE 108 11/09/2021    CHLORIDE 108 02/01/2021    CO2 27 11/09/2021    CO2 31 02/01/2021    ANIONGAP 5 11/09/2021    ANIONGAP 1 (L) 02/01/2021    GLC 86 11/09/2021     (H) 02/01/2021    BUN 16 11/09/2021    BUN 15 02/01/2021    CR 0.79 11/09/2021    CR 0.75 02/01/2021    GFRESTIMATED >90 11/09/2021    GFRESTIMATED >90 02/01/2021    GFRESTBLACK >90 02/01/2021    ROMERO 9.1 11/09/2021    ROMERO 9.5 02/01/2021        INR RESULTS:  No results found for: INR    Procedures:  TTE on 11/29/2021: Reviewed.  Interpretation Summary     Global and regional left ventricular function is normal with an EF of 55-60%.  Global right ventricular function is normal. The right ventricle is normal  size.  No significant valvular abnormalities.  No pericardial effusion is present.  There is no prior study for direct comparison.    Zio patch in 11/2021: Reviewed.        Assessment and Plan:   # HTN  # HLD  # GI reflux  # Ffrequent PVCs.  Less symptomatic. [place] = 17.8% per Zio patch in 11/2021.  We discussed treatment options including medication and catheter ablation of PVCs.  The nature, risks, benefits, alternatives and anticipated results of the high risk procedure were explained to the patient. These risks include but are not limited to local vascular damage, bleeding, pulmonary vein stenosis, AV block requiring a  pacemaker implantation, tamponade and stroke. After careful consideration the patient wished to proceed with the procedure. The patient was verbally consented. We will schedule the patient to have this done at his earliest convenience.      I spent a total of 40 min today to review the records, see the patient, and complete the documents.    CC  Patient Care Team:  Kristen Uribe MD as PCP - General (Family Practice)  Joao Eduardo DO as Assigned Musculoskeletal Provider  Opal Cornejo RN as Specialty Care Coordinator (Cardiology)  Eli Morel MD (Cardiovascular Disease)      Answers for HPI/ROS submitted by the patient on 1/20/2022  General Symptoms: No  Skin Symptoms: No  HENT Symptoms: No  EYE SYMPTOMS: No  HEART SYMPTOMS: Yes  LUNG SYMPTOMS: No  INTESTINAL SYMPTOMS: No  URINARY SYMPTOMS: No  REPRODUCTIVE SYMPTOMS: No  SKELETAL SYMPTOMS: No  BLOOD SYMPTOMS: No  NERVOUS SYSTEM SYMPTOMS: No  MENTAL HEALTH SYMPTOMS: No  Chest pain or pressure: No  Fast or irregular heartbeat: Yes  Pain in legs with walking: No  Trouble breathing while lying down: No  Fingers or toes appear blue: No  High blood pressure: No  Low blood pressure: No  Fainting: No  Murmurs: No  Pacemaker: No  Varicose veins: No  Edema or swelling: No  Wake up at night with shortness of breath: No  Light-headedness: Yes  Exercise intolerance: No          Please do not hesitate to contact me if you have any questions/concerns.     Sincerely,     Eli Morel MD

## 2022-01-21 NOTE — NURSING NOTE
Medication Reconciliation:  Rooming staff reviewed and verified all current medications with patient. An updated med list was included in the AVS.    Test Results Reviewed:  EKG, zio results were reviewed by provider with patient today.     EP Procedure:  Patient was given instructions regarding PVC ablation. Patient received an instruction letter today for reference. Discussed purpose, preparation, and procedure with patient. Patient verbalized understanding and agreed to call with further questions or concerns. The EP  was notified of need to schedule procedure and post op follow up appointments.    Return appointment:   Patient given instructions regarding scheduling next clinic visit. Patient verbalized understanding.       Opal Cornejo RN on 1/21/2022 at 1:48 PM

## 2022-01-21 NOTE — PATIENT INSTRUCTIONS
You are scheduled for a Premature Ventricular Contraction (PVC) ablation, at [facility] with Dr. Morel The hospital is located at [address] [place] [address] on the [place] bank of the campus.  If you need to cancel this procedure, please call [phone]. Please note the following schedule below:        You will need to undergo a COVID-19 PCR swab test within 4 days of procedure. You will receive a phone call with more information. If you do not hear from the COVID scheduling team, please call: [phone] to schedule.      Current Visitor Policy: One visitor while you are in your pre-op room. Once you are taken to procedure, the visitor must leave the facility.      Date:   Time: _________________Arrive to the [facility] Waiting Room at the [facility]  PVC Ablation.    1. Your history and physical will be completed by our nurse practitioner when you arrive.  2. Please do not eat anything for 8 hours prior to your procedure. You may have sips of water up until 2 hours prior to your arrival.  3. The morning of your procedure, you may take your scheduled medications with a sip of water.  4. You will discharge the same day and need a .        Post-Procedure Instructions  Care of groin site:    Remove the Band-Aid after 24 hours. If there is minor oozing, apply another Band-aid and remove it after 12 hours.     Do NOT take a bath, use a hot tub, pool, or submerse in water for at least 3 days. You may shower.     It is normal to have a small bruise or lump at the site.    Do not scrub the site.    Do not use lotion or powder near the puncture site for 3 days.    If you start bleeding from the site in your groin: Lie down flat and press firmly on the site. Call your physician immediately, or, come to the emergency room.  Call [phone] right away if you have bleeding that is heavy or does not stop.    Call your doctor/provider if:     You have a large or growing hard lump around the site.      The site is red, swollen, hot or tender.     Blood or fluid is draining from the site.     You have chills or a fever greater than 101 F (38 C).     Your leg or arm turns bluish, feels numb or cool.     You have hives, a rash or unusual itching.      Activity Restrictions    For the first 2 days: Do not stoop or squat. When you cough, sneeze or move your bowels, hold your hand over the puncture site and press gently.    Do not lift more than 10 pounds or exertional activity for 10 days.  - No driving for 24 hours after (with or without general anesthesia).       Date: _______ Follow up with Dr. Morel with ziopatch done 1 month prior (this will be mailed to your home).    Please do not hesitate to utilize [facility] or call us if you have any questions or concerns.    Opal Cornejo RN  Electrophysiology Nurse Coordinator  [phone]    Prema GAN Procedure   [phone]

## 2022-01-21 NOTE — PROGRESS NOTES
HPI:   Kody Salas is a 61 year old male with a pmhx of HTN, HLD, GI reflux and frequent PVCs.  He was referred by Dr. Brown to consider whether he might be a candidate for catheter ablation of PVCs.  He was diagnosed with PVCs in 3/2021.  He is less symptomatic with PVCs.  He denied any chest pain or SOB and complained of lightheadedness.  He is on Atorvastatin 20mg daily and Lisinopril 5mg daily.    PAST MEDICAL HISTORY:  Past Medical History:   Diagnosis Date     Arthritis      High cholesterol      Hypertension      Malignant melanoma of left upper extremity including shoulder (H) 2018       CURRENT MEDICATIONS:  Current Outpatient Medications   Medication Sig Dispense Refill     atorvastatin (LIPITOR) 20 MG tablet TAKE 1 TABLET BY MOUTH EVERY DAY 90 tablet 3     lisinopril (ZESTRIL) 10 MG tablet Take 1 tablet (10 mg) by mouth daily 90 tablet 3     mometasone (ELOCON) 0.1 % external cream APPLY TO AFFECTED AREA DAILY AS DIRECTED 45 g 1     omeprazole (PRILOSEC) 20 MG DR capsule Take 1 Capsule BY MOUTH EVERY DAY  0       PAST SURGICAL HISTORY:  Past Surgical History:   Procedure Laterality Date     COLONOSCOPY WITH CO2 INSUFFLATION N/A 2018    Procedure: COLONOSCOPY WITH CO2 INSUFFLATION;  COLON-SCREENING / ASHER ;  Surgeon: Brandon Ruby MD;  Location: [facility] OR     HC KNEE SCOPE,MED/LAT MENISCUS REPAIR      right     PROPHYLAXIS RETINA DETACH,CRYO/DIATH  2009       ALLERGIES:   No Known Allergies    FAMILY HISTORY:  + Premature coronary artery disease  - Atrial fibrillation  + Sudden cardiac death     SOCIAL HISTORY:  Social History     Tobacco Use     Smoking status: Former Smoker     Packs/day: 1.00     Years: 35.00     Pack years: 35.00     Quit date: 6/15/2012     Years since quittin.6     Smokeless tobacco: Never Used   Substance Use Topics     Alcohol use: No     Comment: quit 2015     Drug use: No       ROS:   Answers for HPI/ROS submitted by the patient  "on 1/20/2022 were reviewed.  Constitutional: No fever, chills, or sweats. Weight stable.   ENT: No visual disturbance, ear ache, epistaxis, sore throat.   Cardiovascular: As per HPI.   Respiratory: No cough, hemoptysis.    GI: No nausea, vomiting, hematemesis, melena, or hematochezia.   : No hematuria.   Integument: Negative.   Psychiatric: Negative.   Hematologic:  Easy bruising, no easy bleeding.  Neuro: Negative.   Endocrinology: No significant heat or cold intolerance   Musculoskeletal: No myalgia.    Exam:  BP (!) 129/91 (BP Location: Right arm, Patient Position: Chair, Cuff Size: Adult Regular)   Pulse (!) 45   Ht 1.88 m (6' 2\")   Wt 105.7 kg (233 lb 1.6 oz)   SpO2 97%   BMI 29.93 kg/m    GENERAL APPEARANCE: healthy, alert and no distress  HEENT: no icterus, no xanthelasmas, normal pupil size and reaction, normal palate, mucosa moist, no central cyanosis  NECK: no adenopathy, no asymmetry, masses, or scars, thyroid normal to palpation and no bruits, JVP not elevated  RESPIRATORY: lungs clear to auscultation - no rales, rhonchi or wheezes, no use of accessory muscles, no retractions, respirations are unlabored, normal respiratory rate  CARDIOVASCULAR: regular rhythm, normal S1 with physiologic split S2, no S3 or S4 and no murmur, click or rub, precordium quiet with normal PMI.  ABDOMEN: soft, non tender, without hepatosplenomegaly, no masses palpable, bowel sounds normal, aorta not enlarged by palpation, no abdominal bruits  EXTREMITIES: peripheral pulses normal, no edema, no bruits  NEURO: alert and oriented to person/place/time, normal speech, gait and affect  VASC: Radial, femoral, dorsalis pedis and posterior tibialis pulses are normal in volumes and symmetric bilaterally. No bruits are heard.  SKIN: no ecchymoses, no rashes    Labs:  CBC RESULTS:   Lab Results   Component Value Date    WBC 8.8 04/12/2013    RBC 5.85 04/12/2013    HGB 14.7 11/09/2021    HGB 16.8 04/12/2013    HCT 51.5 04/12/2013    " MCV 88 04/12/2013    MCH 28.6 04/12/2013    MCHC 32.5 04/12/2013    RDW 13.7 04/12/2013     04/12/2013       BMP RESULTS:  Lab Results   Component Value Date     11/09/2021     02/01/2021    POTASSIUM 4.4 11/09/2021    POTASSIUM 4.7 02/01/2021    CHLORIDE 108 11/09/2021    CHLORIDE 108 02/01/2021    CO2 27 11/09/2021    CO2 31 02/01/2021    ANIONGAP 5 11/09/2021    ANIONGAP 1 (L) 02/01/2021    GLC 86 11/09/2021     (H) 02/01/2021    BUN 16 11/09/2021    BUN 15 02/01/2021    CR 0.79 11/09/2021    CR 0.75 02/01/2021    GFRESTIMATED >90 11/09/2021    GFRESTIMATED >90 02/01/2021    GFRESTBLACK >90 02/01/2021    ROMERO 9.1 11/09/2021    ROMERO 9.5 02/01/2021        INR RESULTS:  No results found for: INR    Procedures:  TTE on 11/29/2021: Reviewed.  Interpretation Summary     Global and regional left ventricular function is normal with an EF of 55-60%.  Global right ventricular function is normal. The right ventricle is normal  size.  No significant valvular abnormalities.  No pericardial effusion is present.  There is no prior study for direct comparison.    Zio patch in 11/2021: Reviewed.        Assessment and Plan:   # HTN  # HLD  # GI reflux  # Ffrequent PVCs.  Less symptomatic. [place] = 17.8% per Zio patch in 11/2021.  We discussed treatment options including medication and catheter ablation of PVCs.  The nature, risks, benefits, alternatives and anticipated results of the high risk procedure were explained to the patient. These risks include but are not limited to local vascular damage, bleeding, pulmonary vein stenosis, AV block requiring a pacemaker implantation, tamponade and stroke. After careful consideration the patient wished to proceed with the procedure. The patient was verbally consented. We will schedule the patient to have this done at his earliest convenience.      I spent a total of 40 min today to review the records, see the patient, and complete the documents.    CC  Patient Care  Team:  Kristen Uribe MD as PCP - General (Family Practice)  Kristen Uribe MD as Assigned PCP  Joao Eduardo DO as Assigned Musculoskeletal Provider  Opal Cornejo RN as Specialty Care Coordinator (Cardiology)  Eli Morel MD (Cardiovascular Disease)  KRISTEN URIBE    Answers for HPI/ROS submitted by the patient on 1/20/2022  General Symptoms: No  Skin Symptoms: No  HENT Symptoms: No  EYE SYMPTOMS: No  HEART SYMPTOMS: Yes  LUNG SYMPTOMS: No  INTESTINAL SYMPTOMS: No  URINARY SYMPTOMS: No  REPRODUCTIVE SYMPTOMS: No  SKELETAL SYMPTOMS: No  BLOOD SYMPTOMS: No  NERVOUS SYSTEM SYMPTOMS: No  MENTAL HEALTH SYMPTOMS: No  Chest pain or pressure: No  Fast or irregular heartbeat: Yes  Pain in legs with walking: No  Trouble breathing while lying down: No  Fingers or toes appear blue: No  High blood pressure: No  Low blood pressure: No  Fainting: No  Murmurs: No  Pacemaker: No  Varicose veins: No  Edema or swelling: No  Wake up at night with shortness of breath: No  Light-headedness: Yes  Exercise intolerance: No

## 2022-01-21 NOTE — NURSING NOTE
Chief Complaint   Patient presents with     New Patient     PVC ablation consult     Vitals were taken and medications were reconciled. EKG was performed   NICCI Santa  1:02 PM

## 2022-01-26 DIAGNOSIS — Z11.59 ENCOUNTER FOR SCREENING FOR OTHER VIRAL DISEASES: Primary | ICD-10-CM

## 2022-01-26 LAB
ATRIAL RATE - MUSE: 84 BPM
DIASTOLIC BLOOD PRESSURE - MUSE: NORMAL MMHG
INTERPRETATION ECG - MUSE: NORMAL
P AXIS - MUSE: 53 DEGREES
PR INTERVAL - MUSE: 144 MS
QRS DURATION - MUSE: 132 MS
QT - MUSE: 394 MS
QTC - MUSE: 465 MS
R AXIS - MUSE: 50 DEGREES
SYSTOLIC BLOOD PRESSURE - MUSE: NORMAL MMHG
T AXIS - MUSE: 15 DEGREES
VENTRICULAR RATE- MUSE: 84 BPM

## 2022-01-31 ENCOUNTER — LAB (OUTPATIENT)
Dept: LAB | Facility: CLINIC | Age: 62
End: 2022-01-31
Payer: COMMERCIAL

## 2022-01-31 DIAGNOSIS — Z11.59 ENCOUNTER FOR SCREENING FOR OTHER VIRAL DISEASES: ICD-10-CM

## 2022-01-31 PROCEDURE — U0005 INFEC AGEN DETEC AMPLI PROBE: HCPCS

## 2022-01-31 PROCEDURE — U0003 INFECTIOUS AGENT DETECTION BY NUCLEIC ACID (DNA OR RNA); SEVERE ACUTE RESPIRATORY SYNDROME CORONAVIRUS 2 (SARS-COV-2) (CORONAVIRUS DISEASE [COVID-19]), AMPLIFIED PROBE TECHNIQUE, MAKING USE OF HIGH THROUGHPUT TECHNOLOGIES AS DESCRIBED BY CMS-2020-01-R: HCPCS

## 2022-02-01 LAB — SARS-COV-2 RNA RESP QL NAA+PROBE: NEGATIVE

## 2022-02-02 ENCOUNTER — TELEPHONE (OUTPATIENT)
Dept: CARDIOLOGY | Facility: CLINIC | Age: 62
End: 2022-02-02
Payer: COMMERCIAL

## 2022-02-02 NOTE — TELEPHONE ENCOUNTER
Left voicemail for patient to complete Travel Screen for Cardiac Cath Lab appointment on 2/3 and inform patient of updated Visitor Policy.       covid neg

## 2022-02-03 ENCOUNTER — APPOINTMENT (OUTPATIENT)
Dept: LAB | Facility: CLINIC | Age: 62
End: 2022-02-03
Attending: INTERNAL MEDICINE
Payer: COMMERCIAL

## 2022-02-03 ENCOUNTER — HOSPITAL ENCOUNTER (OUTPATIENT)
Facility: CLINIC | Age: 62
Discharge: HOME | End: 2022-02-03
Attending: INTERNAL MEDICINE | Admitting: INTERNAL MEDICINE
Payer: COMMERCIAL

## 2022-02-03 ENCOUNTER — APPOINTMENT (OUTPATIENT)
Dept: MEDSURG UNIT | Facility: CLINIC | Age: 62
End: 2022-02-03
Payer: COMMERCIAL

## 2022-02-03 VITALS
RESPIRATION RATE: 16 BRPM | BODY MASS INDEX: 29.52 KG/M2 | HEART RATE: 59 BPM | DIASTOLIC BLOOD PRESSURE: 96 MMHG | WEIGHT: 230 LBS | OXYGEN SATURATION: 96 % | HEIGHT: 74 IN | TEMPERATURE: 98.7 F | SYSTOLIC BLOOD PRESSURE: 142 MMHG

## 2022-02-03 DIAGNOSIS — I49.3 PVC'S (PREMATURE VENTRICULAR CONTRACTIONS): ICD-10-CM

## 2022-02-03 LAB
ACT BLD: 152 SECONDS (ref 74–150)
ACT BLD: 301 SECONDS (ref 74–150)
ACT BLD: 318 SECONDS (ref 74–150)
ACT BLD: 352 SECONDS (ref 74–150)
ACT BLD: 382 SECONDS (ref 74–150)
ACT BLD: 390 SECONDS (ref 74–150)
ACT BLD: 403 SECONDS (ref 74–150)
ANION GAP SERPL CALCULATED.3IONS-SCNC: 2 MMOL/L (ref 3–14)
ATRIAL RATE - MUSE: 66 BPM
BUN SERPL-MCNC: 16 MG/DL (ref 7–30)
CALCIUM SERPL-MCNC: 9.4 MG/DL (ref 8.5–10.1)
CHLORIDE BLD-SCNC: 110 MMOL/L (ref 94–109)
CO2 SERPL-SCNC: 26 MMOL/L (ref 20–32)
CREAT SERPL-MCNC: 0.79 MG/DL (ref 0.66–1.25)
DIASTOLIC BLOOD PRESSURE - MUSE: NORMAL MMHG
ERYTHROCYTE [DISTWIDTH] IN BLOOD BY AUTOMATED COUNT: 12.9 % (ref 10–15)
GFR SERPL CREATININE-BSD FRML MDRD: >90 ML/MIN/1.73M2
GLUCOSE BLD-MCNC: 106 MG/DL (ref 70–99)
HCT VFR BLD AUTO: 46.5 % (ref 40–53)
HGB BLD-MCNC: 15.4 G/DL (ref 13.3–17.7)
INTERPRETATION ECG - MUSE: NORMAL
MCH RBC QN AUTO: 27.6 PG (ref 26.5–33)
MCHC RBC AUTO-ENTMCNC: 33.1 G/DL (ref 31.5–36.5)
MCV RBC AUTO: 83 FL (ref 78–100)
P AXIS - MUSE: 17 DEGREES
PLATELET # BLD AUTO: 203 10E3/UL (ref 150–450)
POTASSIUM BLD-SCNC: 4.1 MMOL/L (ref 3.4–5.3)
PR INTERVAL - MUSE: 142 MS
QRS DURATION - MUSE: 138 MS
QT - MUSE: 408 MS
QTC - MUSE: 455 MS
R AXIS - MUSE: 15 DEGREES
RBC # BLD AUTO: 5.58 10E6/UL (ref 4.4–5.9)
SODIUM SERPL-SCNC: 138 MMOL/L (ref 133–144)
SYSTOLIC BLOOD PRESSURE - MUSE: NORMAL MMHG
T AXIS - MUSE: -7 DEGREES
VENTRICULAR RATE- MUSE: 75 BPM
WBC # BLD AUTO: 6 10E3/UL (ref 4–11)

## 2022-02-03 PROCEDURE — 272N000001 HC OR GENERAL SUPPLY STERILE: Performed by: INTERNAL MEDICINE

## 2022-02-03 PROCEDURE — C1894 INTRO/SHEATH, NON-LASER: HCPCS | Performed by: INTERNAL MEDICINE

## 2022-02-03 PROCEDURE — 258N000003 HC RX IP 258 OP 636: Performed by: INTERNAL MEDICINE

## 2022-02-03 PROCEDURE — 85027 COMPLETE CBC AUTOMATED: CPT | Performed by: INTERNAL MEDICINE

## 2022-02-03 PROCEDURE — 250N000011 HC RX IP 250 OP 636: Performed by: NURSE PRACTITIONER

## 2022-02-03 PROCEDURE — 250N000011 HC RX IP 250 OP 636: Performed by: INTERNAL MEDICINE

## 2022-02-03 PROCEDURE — 258N000003 HC RX IP 258 OP 636: Performed by: NURSE PRACTITIONER

## 2022-02-03 PROCEDURE — 36415 COLL VENOUS BLD VENIPUNCTURE: CPT | Performed by: INTERNAL MEDICINE

## 2022-02-03 PROCEDURE — 250N000009 HC RX 250: Performed by: INTERNAL MEDICINE

## 2022-02-03 PROCEDURE — 93005 ELECTROCARDIOGRAM TRACING: CPT

## 2022-02-03 PROCEDURE — 99152 MOD SED SAME PHYS/QHP 5/>YRS: CPT | Performed by: INTERNAL MEDICINE

## 2022-02-03 PROCEDURE — C1730 CATH, EP, 19 OR FEW ELECT: HCPCS | Performed by: INTERNAL MEDICINE

## 2022-02-03 PROCEDURE — 93654 COMPRE EP EVAL TX VT: CPT | Performed by: INTERNAL MEDICINE

## 2022-02-03 PROCEDURE — 93010 ELECTROCARDIOGRAM REPORT: CPT | Mod: 76 | Performed by: INTERNAL MEDICINE

## 2022-02-03 PROCEDURE — 250N000013 HC RX MED GY IP 250 OP 250 PS 637: Performed by: INTERNAL MEDICINE

## 2022-02-03 PROCEDURE — 999N000054 HC STATISTIC EKG NON-CHARGEABLE

## 2022-02-03 PROCEDURE — C1732 CATH, EP, DIAG/ABL, 3D/VECT: HCPCS | Performed by: INTERNAL MEDICINE

## 2022-02-03 PROCEDURE — 250N000009 HC RX 250: Performed by: NURSE PRACTITIONER

## 2022-02-03 PROCEDURE — 250N000013 HC RX MED GY IP 250 OP 250 PS 637: Performed by: NURSE PRACTITIONER

## 2022-02-03 PROCEDURE — C1733 CATH, EP, OTHR THAN COOL-TIP: HCPCS | Performed by: INTERNAL MEDICINE

## 2022-02-03 PROCEDURE — 99153 MOD SED SAME PHYS/QHP EA: CPT | Performed by: INTERNAL MEDICINE

## 2022-02-03 PROCEDURE — 85347 COAGULATION TIME ACTIVATED: CPT

## 2022-02-03 PROCEDURE — 80048 BASIC METABOLIC PNL TOTAL CA: CPT | Performed by: INTERNAL MEDICINE

## 2022-02-03 PROCEDURE — 999N000134 HC STATISTIC PP CARE STAGE 3

## 2022-02-03 PROCEDURE — 999N000142 HC STATISTIC PROCEDURE PREP ONLY

## 2022-02-03 RX ORDER — NALOXONE HYDROCHLORIDE 0.4 MG/ML
0.2 INJECTION, SOLUTION INTRAMUSCULAR; INTRAVENOUS; SUBCUTANEOUS
Status: DISCONTINUED | OUTPATIENT
Start: 2022-02-03 | End: 2022-02-03 | Stop reason: HOSPADM

## 2022-02-03 RX ORDER — DOBUTAMINE HYDROCHLORIDE 200 MG/100ML
5-40 INJECTION INTRAVENOUS CONTINUOUS PRN
Status: DISCONTINUED | OUTPATIENT
Start: 2022-02-03 | End: 2022-02-03 | Stop reason: HOSPADM

## 2022-02-03 RX ORDER — CALCIUM CARBONATE 500 MG/1
500 TABLET, CHEWABLE ORAL DAILY PRN
Status: DISCONTINUED | OUTPATIENT
Start: 2022-02-03 | End: 2022-02-03 | Stop reason: HOSPADM

## 2022-02-03 RX ORDER — MIDAZOLAM HCL IN 0.9 % NACL/PF 1 MG/ML
.5-6 PLASTIC BAG, INJECTION (ML) INTRAVENOUS CONTINUOUS PRN
Status: DISCONTINUED | OUTPATIENT
Start: 2022-02-03 | End: 2022-02-03 | Stop reason: HOSPADM

## 2022-02-03 RX ORDER — CEFAZOLIN SODIUM 1 G/3ML
1 INJECTION, POWDER, FOR SOLUTION INTRAMUSCULAR; INTRAVENOUS
Status: DISCONTINUED | OUTPATIENT
Start: 2022-02-03 | End: 2022-02-03 | Stop reason: HOSPADM

## 2022-02-03 RX ORDER — SODIUM CHLORIDE 9 MG/ML
INJECTION, SOLUTION INTRAVENOUS CONTINUOUS
Status: DISCONTINUED | OUTPATIENT
Start: 2022-02-03 | End: 2022-02-03 | Stop reason: HOSPADM

## 2022-02-03 RX ORDER — HEPARIN SODIUM 1000 [USP'U]/ML
INJECTION, SOLUTION INTRAVENOUS; SUBCUTANEOUS
Status: DISCONTINUED | OUTPATIENT
Start: 2022-02-03 | End: 2022-02-03 | Stop reason: HOSPADM

## 2022-02-03 RX ORDER — PROTAMINE SULFATE 10 MG/ML
INJECTION, SOLUTION INTRAVENOUS
Status: DISCONTINUED | OUTPATIENT
Start: 2022-02-03 | End: 2022-02-03 | Stop reason: HOSPADM

## 2022-02-03 RX ORDER — OXYCODONE AND ACETAMINOPHEN 5; 325 MG/1; MG/1
1 TABLET ORAL EVERY 4 HOURS PRN
Status: DISCONTINUED | OUTPATIENT
Start: 2022-02-03 | End: 2022-02-03 | Stop reason: HOSPADM

## 2022-02-03 RX ORDER — LIDOCAINE 40 MG/G
CREAM TOPICAL
Status: DISCONTINUED | OUTPATIENT
Start: 2022-02-03 | End: 2022-02-03 | Stop reason: HOSPADM

## 2022-02-03 RX ORDER — FENTANYL CITRATE 50 UG/ML
INJECTION, SOLUTION INTRAMUSCULAR; INTRAVENOUS
Status: DISCONTINUED | OUTPATIENT
Start: 2022-02-03 | End: 2022-02-03 | Stop reason: HOSPADM

## 2022-02-03 RX ORDER — NALOXONE HYDROCHLORIDE 0.4 MG/ML
0.4 INJECTION, SOLUTION INTRAMUSCULAR; INTRAVENOUS; SUBCUTANEOUS
Status: DISCONTINUED | OUTPATIENT
Start: 2022-02-03 | End: 2022-02-03 | Stop reason: HOSPADM

## 2022-02-03 RX ORDER — IOPAMIDOL 755 MG/ML
INJECTION, SOLUTION INTRAVASCULAR
Status: DISCONTINUED | OUTPATIENT
Start: 2022-02-03 | End: 2022-02-03 | Stop reason: HOSPADM

## 2022-02-03 RX ADMIN — ISOPROTERENOL HYDROCHLORIDE 2 MCG/MIN: 0.2 INJECTION, SOLUTION INTRAMUSCULAR; INTRAVENOUS at 09:57

## 2022-02-03 RX ADMIN — OXYCODONE HYDROCHLORIDE AND ACETAMINOPHEN 1 TABLET: 5; 325 TABLET ORAL at 13:05

## 2022-02-03 RX ADMIN — CALCIUM CARBONATE (ANTACID) CHEW TAB 500 MG 500 MG: 500 CHEW TAB at 14:19

## 2022-02-03 RX ADMIN — SODIUM CHLORIDE: 9 INJECTION, SOLUTION INTRAVENOUS at 07:59

## 2022-02-03 ASSESSMENT — MIFFLIN-ST. JEOR: SCORE: 1923.02

## 2022-02-03 NOTE — PROGRESS NOTES
Pt reports chest pain is better after tums and crackers and coffee. Pt reports back pain is better after pain pill; HOB up 30, repositioning.

## 2022-02-03 NOTE — H&P
H&P from Dr. Morel's Office Visit 1/21/22 reviewed. Following today's exam there are no interval changes.   NANDINI Lindsey CNP  Electrophysiology Consult Service  Pager: [phone]

## 2022-02-03 NOTE — PROGRESS NOTES
Pt on 3C per litter with RN post PVC Ablation, pt awake and alert, reports low back pain. Oral pain med and hot pack to low back with HOB to 20 degrees. Pt reports he has this pain at home also.  Site at right groin is, soft,  flat, dry and intact. Family updated per phone, pt aware. . Pt taking clears without problem. Will continue to monitor.

## 2022-02-03 NOTE — PRE-PROCEDURE
GENERAL PRE-PROCEDURE:   Procedure:  PVC ablation  Date/Time:  2/3/2022 8:06 AM    Risks and benefits: Risks, benefits and alternatives were discussed    Consent given by:  Patient  Patient states understanding of procedure being performed: Yes    Patient's understanding of procedure matches consent: Yes    Procedure consent matches procedure scheduled: Yes    Appropriately NPO:  Yes  ASA Class:  2  Mallampati  :  Grade 2- soft palate, base of uvula, tonsillar pillars, and portion of posterior pharyngeal wall visible  Lungs:  Lungs clear with good breath sounds bilaterally  Heart:  Normal heart sounds and rate  History & Physical reviewed:  History and physical reviewed and no updates needed  Statement of review:  I have reviewed the lab findings, diagnostic data, medications, and the plan for sedation

## 2022-02-03 NOTE — Clinical Note
Potential access sites were evaluated for patency using ultrasound.   The right femoral artery and right femoral vein were selected. Access was obtained under with Sonosite guidance using a micropuncture 21 gauge needle with direct visualization of needle entry.

## 2022-02-03 NOTE — DISCHARGE INSTRUCTIONS
Post-Ablation Discharge Instructions  [facility]  Going Home after Sedation      For 24 hours:    An adult should stay with you.    Relax and take it easy.    DO NOT make any important legal decisions.    DO NOT drive or operate machines at home or at work.    Resume your regular diet and drink plenty of fluids.    CALL THE PHYSICIAN IF:    You develop nausea or vomiting    You develop hives or a rash or any unexplained itching                                                                                                                                                                                          Care of groin site:         Remove the Band-Aid after 24 hours. If there is minor oozing, apply another Band-aid and remove it after 12 hours.          Do NOT take a bath, use a hot tub, pool, or submerse in water for at least 3 days You may shower.          It is normal to have a small bruise or lump at the site.         Do not scrub the site.         Do not use lotion or powder near the puncture site for 3 days.         For the first 2 days: Do not stoop or squat. When you cough, sneeze or move your bowels, hold your hand over the puncture site and press gently.         Do not lift more than 10 pounds or exertional activity for 10 days.      If you start bleeding from the site in your groin:  Lie down flat and press firmly on the site.  Call your physician immediately, or, come to the emergency room.    Call 911 right away if you have bleeding that is heavy or does not stop.     Call your doctor/provider if:         You have a large or growing hard lump around the site.         The site is red, swollen, hot or tender.         Blood or fluid is draining from the site.         You have chills or a fever greater than 101 F (38 C).         Your leg or arm turns bluish, feels numb or cool.         You have hives, a rash or unusual itching.     Cardiovascular Clinic:   [address] [place] [address]. [place] [address]  Your Care Team:  EP Cardiology   Telephone Number     Eve Morel [phone]   Opal Chery [phone]     For scheduling appts or procedures:    Prema Caraballo   [phone]     As always, Thank you for trusting us with your health care needs

## 2022-02-04 LAB
ATRIAL RATE - MUSE: 58 BPM
DIASTOLIC BLOOD PRESSURE - MUSE: NORMAL MMHG
INTERPRETATION ECG - MUSE: NORMAL
P AXIS - MUSE: 7 DEGREES
PR INTERVAL - MUSE: 160 MS
QRS DURATION - MUSE: 136 MS
QT - MUSE: 446 MS
QTC - MUSE: 437 MS
R AXIS - MUSE: 43 DEGREES
SYSTOLIC BLOOD PRESSURE - MUSE: NORMAL MMHG
T AXIS - MUSE: 0 DEGREES
VENTRICULAR RATE- MUSE: 58 BPM

## 2022-02-04 NOTE — PROGRESS NOTES
Patient tolerated recovery stage well. VSS, right groin site clean/dry/intact, no hematoma, and denies pain. Patient tolerated PO food and fluids. Teaching was done and discharge instructions were given. Patient ambulated, voided, and PIV was removed. Patient discharged from the hospital via wheel chair to home with wife.

## 2022-02-17 DIAGNOSIS — E78.5 HYPERLIPIDEMIA LDL GOAL <130: ICD-10-CM

## 2022-02-17 DIAGNOSIS — I10 HYPERTENSION GOAL BP (BLOOD PRESSURE) < 140/90: Primary | ICD-10-CM

## 2022-02-17 NOTE — LETTER
February 22, 2022      Kody Salas  [address] [facility] [place]  [place] [address]        Dear Kody,     Your provider has sent a 90 day airam refill of atorvastatin (LIPITOR) 20 MG tablet and lisinopril (ZESTRIL) 5 MG tablet. You are due for a lab appointment for further refills. Please contact the clinic to schedule an appointment for further refills.      Sincerely,        Kristen Uribe MD

## 2022-02-18 NOTE — TELEPHONE ENCOUNTER
"Routing refill request to provider for review/approval because:  Blood Pressure and Labs out of range:  LDL      Requested Prescriptions   Pending Prescriptions Disp Refills     lisinopril (ZESTRIL) 5 MG tablet [Pharmacy Med Name: LISINOPRIL 5 MG TABLET] 90 tablet 2     Sig: TAKE 1 TABLET BY MOUTH EVERY DAY       ACE Inhibitors (Including Combos) Protocol Failed - 2/17/2022 12:27 AM        Failed - Blood pressure under 140/90 in past 12 months     BP Readings from Last 3 Encounters:   02/03/22 (!) 142/96   01/21/22 (!) 129/91   12/29/21 (!) 142/70                 Passed - Recent (12 mo) or future (30 days) visit within the authorizing provider's specialty     Patient has had an office visit with the authorizing provider or a provider within the authorizing providers department within the previous 12 mos or has a future within next 30 days. See \"Patient Info\" tab in inbasket, or \"Choose Columns\" in Meds & Orders section of the refill encounter.              Passed - Medication is active on med list        Passed - Patient is age 18 or older        Passed - Normal serum creatinine on file in past 12 months     Recent Labs   Lab Test 02/03/22  0651   CR 0.79       Ok to refill medication if creatinine is low          Passed - Normal serum potassium on file in past 12 months     Recent Labs   Lab Test 02/03/22  0651   POTASSIUM 4.1                atorvastatin (LIPITOR) 20 MG tablet [Pharmacy Med Name: ATORVASTATIN 20 MG TABLET] 90 tablet 2     Sig: TAKE 1 TABLET BY MOUTH EVERY DAY       Statins Protocol Failed - 2/17/2022 12:27 AM        Failed - LDL on file in past 12 months     Recent Labs   Lab Test 02/01/21  0852   *             Passed - No abnormal creatine kinase in past 12 months     No lab results found.             Passed - Recent (12 mo) or future (30 days) visit within the authorizing provider's specialty     Patient has had an office visit with the authorizing provider or a provider within the " "authorizing providers department within the previous 12 mos or has a future within next 30 days. See \"Patient Info\" tab in inbasket, or \"Choose Columns\" in Meds & Orders section of the refill encounter.              Passed - Medication is active on med list        Passed - Patient is age 18 or older           Jackelyn Craig MSN, BSN, RN on 2/18/2022 at 1:02 PM  [employer]    "

## 2022-02-21 RX ORDER — LISINOPRIL 5 MG/1
TABLET ORAL
Qty: 90 TABLET | Refills: 0 | Status: SHIPPED | OUTPATIENT
Start: 2022-02-21 | End: 2022-04-11

## 2022-02-21 RX ORDER — ATORVASTATIN CALCIUM 20 MG/1
TABLET, FILM COATED ORAL
Qty: 90 TABLET | Refills: 0 | Status: SHIPPED | OUTPATIENT
Start: 2022-02-21 | End: 2022-04-13

## 2022-03-02 ENCOUNTER — TRANSFERRED RECORDS (OUTPATIENT)
Dept: HEALTH INFORMATION MANAGEMENT | Facility: CLINIC | Age: 62
End: 2022-03-02
Payer: COMMERCIAL

## 2022-03-12 ENCOUNTER — HEALTH MAINTENANCE LETTER (OUTPATIENT)
Age: 62
End: 2022-03-12

## 2022-04-04 ENCOUNTER — ALLIED HEALTH/NURSE VISIT (OUTPATIENT)
Dept: CARDIOLOGY | Facility: CLINIC | Age: 62
End: 2022-04-04
Attending: INTERNAL MEDICINE
Payer: COMMERCIAL

## 2022-04-04 DIAGNOSIS — I49.3 PVC'S (PREMATURE VENTRICULAR CONTRACTIONS): ICD-10-CM

## 2022-04-07 DIAGNOSIS — I10 HYPERTENSION GOAL BP (BLOOD PRESSURE) < 140/90: ICD-10-CM

## 2022-04-08 NOTE — TELEPHONE ENCOUNTER
"Routing refill request to provider for review/approval because:  Blood Pressure out of range    Requested Prescriptions   Pending Prescriptions Disp Refills    lisinopril (ZESTRIL) 5 MG tablet [Pharmacy Med Name: LISINOPRIL 5 MG TABLET] 90 tablet 0     Sig: TAKE 1 TABLET BY MOUTH EVERY DAY        ACE Inhibitors (Including Combos) Protocol Failed - 4/7/2022  9:58 AM        Failed - Blood pressure under 140/90 in past 12 months       BP Readings from Last 3 Encounters:   02/03/22 (!) 142/96   01/21/22 (!) 129/91   12/29/21 (!) 142/70                 Passed - Recent (12 mo) or future (30 days) visit within the authorizing provider's specialty     Patient has had an office visit with the authorizing provider or a provider within the authorizing providers department within the previous 12 mos or has a future within next 30 days. See \"Patient Info\" tab in inbasket, or \"Choose Columns\" in Meds & Orders section of the refill encounter.              Passed - Medication is active on med list        Passed - Patient is age 18 or older        Passed - Normal serum creatinine on file in past 12 months     Recent Labs   Lab Test 02/03/22  0651   CR 0.79       Ok to refill medication if creatinine is low          Passed - Normal serum potassium on file in past 12 months     Recent Labs   Lab Test 02/03/22  0651   POTASSIUM 4.1                     Irene Ralph RN   [facility]   "

## 2022-04-11 RX ORDER — LISINOPRIL 5 MG/1
TABLET ORAL
Qty: 90 TABLET | Refills: 0 | Status: SHIPPED | OUTPATIENT
Start: 2022-04-11 | End: 2022-04-13

## 2022-04-13 ENCOUNTER — MYC REFILL (OUTPATIENT)
Dept: FAMILY MEDICINE | Facility: CLINIC | Age: 62
End: 2022-04-13
Payer: COMMERCIAL

## 2022-04-13 DIAGNOSIS — E78.5 HYPERLIPIDEMIA LDL GOAL <130: ICD-10-CM

## 2022-04-13 DIAGNOSIS — I10 HYPERTENSION GOAL BP (BLOOD PRESSURE) < 140/90: ICD-10-CM

## 2022-04-15 RX ORDER — ATORVASTATIN CALCIUM 20 MG/1
20 TABLET, FILM COATED ORAL DAILY
Qty: 90 TABLET | Refills: 0 | Status: SHIPPED | OUTPATIENT
Start: 2022-04-15 | End: 2022-08-02

## 2022-04-15 RX ORDER — LISINOPRIL 5 MG/1
5 TABLET ORAL DAILY
Qty: 90 TABLET | Refills: 0 | Status: SHIPPED | OUTPATIENT
Start: 2022-04-15 | End: 2022-10-28

## 2022-04-15 NOTE — TELEPHONE ENCOUNTER
"Routing refill request to provider for review/approval because:  Patient has 2 different orders for Lisinopril on med list  Labs not current: LDL      Requested Prescriptions   Pending Prescriptions Disp Refills     atorvastatin (LIPITOR) 20 MG tablet 90 tablet 0     Sig: Take 1 tablet (20 mg) by mouth daily       Statins Protocol Failed - 4/13/2022  2:54 PM        Failed - LDL on file in past 12 months     Recent Labs   Lab Test 02/01/21  0852   *             Passed - No abnormal creatine kinase in past 12 months     No lab results found.             Passed - Recent (12 mo) or future (30 days) visit within the authorizing provider's specialty     Patient has had an office visit with the authorizing provider or a provider within the authorizing providers department within the previous 12 mos or has a future within next 30 days. See \"Patient Info\" tab in inbasket, or \"Choose Columns\" in Meds & Orders section of the refill encounter.              Passed - Medication is active on med list        Passed - Patient is age 18 or older           lisinopril (ZESTRIL) 5 MG tablet 90 tablet 0     Sig: Take 1 tablet (5 mg) by mouth daily       ACE Inhibitors (Including Combos) Protocol Failed - 4/13/2022  2:54 PM        Failed - Blood pressure under 140/90 in past 12 months     BP Readings from Last 3 Encounters:   02/03/22 (!) 142/96   01/21/22 (!) 129/91   12/29/21 (!) 142/70                 Passed - Recent (12 mo) or future (30 days) visit within the authorizing provider's specialty     Patient has had an office visit with the authorizing provider or a provider within the authorizing providers department within the previous 12 mos or has a future within next 30 days. See \"Patient Info\" tab in inbasket, or \"Choose Columns\" in Meds & Orders section of the refill encounter.              Passed - Medication is active on med list        Passed - Patient is age 18 or older        Passed - Normal serum creatinine on file in " past 12 months     Recent Labs   Lab Test 02/03/22  0651   CR 0.79       Ok to refill medication if creatinine is low          Passed - Normal serum potassium on file in past 12 months     Recent Labs   Lab Test 02/03/22  0651   POTASSIUM 4.1                Samira Nevarez RN

## 2022-04-25 NOTE — TELEPHONE ENCOUNTER
Called patient x3 messages left to call back and schedule, sending to provider to advise.         Nevin JALLOH CMA ([facility])

## 2022-05-09 ENCOUNTER — OFFICE VISIT (OUTPATIENT)
Dept: CARDIOLOGY | Facility: CLINIC | Age: 62
End: 2022-05-09
Attending: INTERNAL MEDICINE
Payer: COMMERCIAL

## 2022-05-09 VITALS
WEIGHT: 211 LBS | OXYGEN SATURATION: 96 % | HEART RATE: 107 BPM | HEIGHT: 73 IN | BODY MASS INDEX: 27.96 KG/M2 | SYSTOLIC BLOOD PRESSURE: 120 MMHG | DIASTOLIC BLOOD PRESSURE: 84 MMHG

## 2022-05-09 DIAGNOSIS — I49.3 PVC'S (PREMATURE VENTRICULAR CONTRACTIONS): Primary | ICD-10-CM

## 2022-05-09 PROCEDURE — 93005 ELECTROCARDIOGRAM TRACING: CPT

## 2022-05-09 PROCEDURE — G0463 HOSPITAL OUTPT CLINIC VISIT: HCPCS | Mod: 25

## 2022-05-09 PROCEDURE — 99213 OFFICE O/P EST LOW 20 MIN: CPT | Performed by: INTERNAL MEDICINE

## 2022-05-09 ASSESSMENT — PAIN SCALES - GENERAL: PAINLEVEL: NO PAIN (0)

## 2022-05-09 NOTE — LETTER
5/9/2022      RE: Kody Salas  [address]  [place] [address]       Dear Colleague,    Thank you for the opportunity to participate in the care of your patient, Kody Salas, at the [facility] [place] [facility]. Please see a copy of my visit note below.    HPI:   Koyd Salas is a 61 year old male with a pmhx of HTN, HLD, GI reflux and frequent PVCs.  He was referred by Dr. Brown to consider whether he might be a candidate for catheter ablation of PVCs.  He was diagnosed with PVCs in 3/2021.  He is less symptomatic with PVCs.  He denied any chest pain or SOB and complained of lightheadedness.  He is on Atorvastatin 20mg daily and Lisinopril 5mg daily.    He underwent successful catheter ablation of PVCs originating from the posteroseptal, posterolateral, and anterolateral aspects of the mitral annulus and unsuccessful catheter ablation of PVCs originating from the LV summit on 2/3/2022.  Since then he has been doing well and is now seen for a follow up.    PAST MEDICAL HISTORY:  Past Medical History:   Diagnosis Date     Arthritis      High cholesterol      Hypertension      Malignant melanoma of left upper extremity including shoulder (H) 7/13/2018       CURRENT MEDICATIONS:  Current Outpatient Medications   Medication Sig Dispense Refill     atorvastatin (LIPITOR) 20 MG tablet Take 1 tablet (20 mg) by mouth daily 90 tablet 0     lisinopril (ZESTRIL) 5 MG tablet Take 1 tablet (5 mg) by mouth daily 90 tablet 0     mometasone (ELOCON) 0.1 % external cream APPLY TO AFFECTED AREA DAILY AS DIRECTED 45 g 1     omeprazole (PRILOSEC) 20 MG DR capsule Take 1 Capsule BY MOUTH EVERY DAY  0     lisinopril (ZESTRIL) 10 MG tablet Take 1 tablet (10 mg) by mouth daily (Patient not taking: Reported on 5/9/2022) 90 tablet 3       PAST SURGICAL HISTORY:  Past Surgical History:   Procedure Laterality Date     COLONOSCOPY WITH CO2  "INSUFFLATION N/A 2018    Procedure: COLONOSCOPY WITH CO2 INSUFFLATION;  COLON-SCREENING / ASHER ;  Surgeon: Brandon Ruby MD;  Location: [facility] OR     EP ABLATION PVC N/A 2/3/2022    Procedure: EP ABLATION PVC;  Surgeon: Eli Morel MD;  Location:  HEART CARDIAC CATH LAB     HC KNEE SCOPE,MED/LAT MENISCUS REPAIR      right     PROPHYLAXIS RETINA DETACH,CRYO/DIATH  2009       ALLERGIES:   No Known Allergies    FAMILY HISTORY:  + Premature coronary artery disease  - Atrial fibrillation  + Sudden cardiac death     SOCIAL HISTORY:  Social History     Tobacco Use     Smoking status: Former Smoker     Packs/day: 1.00     Years: 35.00     Pack years: 35.00     Quit date: 6/15/2012     Years since quittin.9     Smokeless tobacco: Never Used   Vaping Use     Vaping Use: Never used   Substance Use Topics     Alcohol use: No     Comment: quit 2015     Drug use: No       ROS:   Answers for HPI/ROS submitted by the patient on 2022 were reviewed.  Constitutional: No fever, chills, or sweats. Weight stable.   ENT: No visual disturbance, ear ache, epistaxis, sore throat.   Cardiovascular: As per HPI.   Respiratory: No cough, hemoptysis.    GI: No nausea, vomiting, hematemesis, melena, or hematochezia.   : No hematuria.   Integument: Negative.   Psychiatric: Negative.   Hematologic:  Easy bruising, no easy bleeding.  Neuro: Negative.   Endocrinology: No significant heat or cold intolerance   Musculoskeletal: No myalgia.    Exam:  /84 (BP Location: Left arm, Patient Position: Sitting, Cuff Size: Adult Large)   Pulse 107   Ht 1.845 m (6' 0.64\")   Wt 95.7 kg (211 lb)   SpO2 96%   BMI 28.12 kg/m    GENERAL APPEARANCE: healthy, alert and no distress  HEENT: no icterus, no xanthelasmas, normal pupil size and reaction, normal palate, mucosa moist, no central cyanosis  NECK: no adenopathy, no asymmetry, masses, or scars, thyroid normal to palpation and no bruits, JVP not " elevated  RESPIRATORY: lungs clear to auscultation - no rales, rhonchi or wheezes, no use of accessory muscles, no retractions, respirations are unlabored, normal respiratory rate  CARDIOVASCULAR: regular rhythm, normal S1 with physiologic split S2, no S3 or S4 and no murmur, click or rub, precordium quiet with normal PMI.  ABDOMEN: soft, non tender, without hepatosplenomegaly, no masses palpable, bowel sounds normal, aorta not enlarged by palpation, no abdominal bruits  EXTREMITIES: peripheral pulses normal, no edema, no bruits  NEURO: alert and oriented to person/place/time, normal speech, gait and affect  VASC: Radial, femoral, dorsalis pedis and posterior tibialis pulses are normal in volumes and symmetric bilaterally. No bruits are heard.  SKIN: no ecchymoses, no rashes    Labs:  CBC RESULTS:   Lab Results   Component Value Date    WBC 6.0 02/03/2022    WBC 8.8 04/12/2013    RBC 5.58 02/03/2022    RBC 5.85 04/12/2013    HGB 15.4 02/03/2022    HGB 16.8 04/12/2013    HCT 46.5 02/03/2022    HCT 51.5 04/12/2013    MCV 83 02/03/2022    MCV 88 04/12/2013    MCH 27.6 02/03/2022    MCH 28.6 04/12/2013    MCHC 33.1 02/03/2022    MCHC 32.5 04/12/2013    RDW 12.9 02/03/2022    RDW 13.7 04/12/2013     02/03/2022     04/12/2013       BMP RESULTS:  Lab Results   Component Value Date     02/03/2022     02/01/2021    POTASSIUM 4.1 02/03/2022    POTASSIUM 4.7 02/01/2021    CHLORIDE 110 (H) 02/03/2022    CHLORIDE 108 02/01/2021    CO2 26 02/03/2022    CO2 31 02/01/2021    ANIONGAP 2 (L) 02/03/2022    ANIONGAP 1 (L) 02/01/2021     (H) 02/03/2022     (H) 02/01/2021    BUN 16 02/03/2022    BUN 15 02/01/2021    CR 0.79 02/03/2022    CR 0.75 02/01/2021    GFRESTIMATED >90 02/03/2022    GFRESTIMATED >90 02/01/2021    GFRESTBLACK >90 02/01/2021    ROMERO 9.4 02/03/2022    ROMERO 9.5 02/01/2021        INR RESULTS:  No results found for: INR    Procedures:  TTE on 11/29/2021: Reviewed.  Interpretation  Summary     Global and regional left ventricular function is normal with an EF of 55-60%.  Global right ventricular function is normal. The right ventricle is normal  size.  No significant valvular abnormalities.  No pericardial effusion is present.  There is no prior study for direct comparison.    Zio patch in 11/2021: Reviewed.        Zio patch in 3-4-2022: Reviewed.          ECG on 5/9/2022: Reviewed.      Assessment and Plan:   # HTN  # HLD  # GI reflux  # Ffrequent PVCs.  Less symptomatic. [place] = 17.8% per Zio patch in 11/2021.  -> s/p successful catheter ablation of PVCs originating from the posteroseptal, posterolateral, and anterolateral aspects of the mitral annulus and unsuccessful catheter ablation of PVCs originating from the LV summit on 2/3/2022.  -> PVC burden = <1.0% per repeat Zio patch in 3-4/2022  He has been doing well since the ablation.  I advised him to call us if he has any concerns.  Otherwise, no regular follow up will be required.    I spent a total of 20 min today to review the records, see the patient, and complete the documents.    CC  Patient Care Team:  Kristen Uribe MD as PCP - General (Family Practice)  Kristen Uribe MD as Assigned PCP  Joao Eduardo DO as Assigned Musculoskeletal Provider  Opal Cornejo RN as Specialty Care Coordinator (Cardiology)  Eli Morel MD (Cardiovascular Disease)  Eli Morel MD as Assigned Heart and Vascular Provider  KRISTEN URIBE          Please do not hesitate to contact me if you have any questions/concerns.     Sincerely,     Eli Morel MD

## 2022-05-09 NOTE — LETTER
5/9/2022      RE: Kody Salas  [address]  [place] [address]       HPI:   Kody Salas is a 61 year old male with a pmhx of HTN, HLD, GI reflux and frequent PVCs.  He was referred by Dr. Brown to consider whether he might be a candidate for catheter ablation of PVCs.  He was diagnosed with PVCs in 3/2021.  He is less symptomatic with PVCs.  He denied any chest pain or SOB and complained of lightheadedness.  He is on Atorvastatin 20mg daily and Lisinopril 5mg daily.    He underwent successful catheter ablation of PVCs originating from the posteroseptal, posterolateral, and anterolateral aspects of the mitral annulus and unsuccessful catheter ablation of PVCs originating from the LV summit on 2/3/2022.  Since then he has been doing well and is now seen for a follow up.    PAST MEDICAL HISTORY:  Past Medical History:   Diagnosis Date     Arthritis      High cholesterol      Hypertension      Malignant melanoma of left upper extremity including shoulder (H) 7/13/2018       CURRENT MEDICATIONS:  Current Outpatient Medications   Medication Sig Dispense Refill     atorvastatin (LIPITOR) 20 MG tablet Take 1 tablet (20 mg) by mouth daily 90 tablet 0     lisinopril (ZESTRIL) 5 MG tablet Take 1 tablet (5 mg) by mouth daily 90 tablet 0     mometasone (ELOCON) 0.1 % external cream APPLY TO AFFECTED AREA DAILY AS DIRECTED 45 g 1     omeprazole (PRILOSEC) 20 MG DR capsule Take 1 Capsule BY MOUTH EVERY DAY  0     lisinopril (ZESTRIL) 10 MG tablet Take 1 tablet (10 mg) by mouth daily (Patient not taking: Reported on 5/9/2022) 90 tablet 3       PAST SURGICAL HISTORY:  Past Surgical History:   Procedure Laterality Date     COLONOSCOPY WITH CO2 INSUFFLATION N/A 2/19/2018    Procedure: COLONOSCOPY WITH CO2 INSUFFLATION;  COLON-SCREENING / ASHER ;  Surgeon: Bradnon Ruby MD;  Location: [facility] OR     EP ABLATION PVC N/A 2/3/2022    Procedure: EP ABLATION PVC;  Surgeon: Eli Morel MD;  Location:   "HEART CARDIAC CATH LAB     HC KNEE SCOPE,MED/LAT MENISCUS REPAIR      right     PROPHYLAXIS RETINA DETACH,CRYO/DIATH  2009       ALLERGIES:   No Known Allergies    FAMILY HISTORY:  + Premature coronary artery disease  - Atrial fibrillation  + Sudden cardiac death     SOCIAL HISTORY:  Social History     Tobacco Use     Smoking status: Former Smoker     Packs/day: 1.00     Years: 35.00     Pack years: 35.00     Quit date: 6/15/2012     Years since quittin.9     Smokeless tobacco: Never Used   Vaping Use     Vaping Use: Never used   Substance Use Topics     Alcohol use: No     Comment: quit 2015     Drug use: No       ROS:   Answers for HPI/ROS submitted by the patient on 2022 were reviewed.  Constitutional: No fever, chills, or sweats. Weight stable.   ENT: No visual disturbance, ear ache, epistaxis, sore throat.   Cardiovascular: As per HPI.   Respiratory: No cough, hemoptysis.    GI: No nausea, vomiting, hematemesis, melena, or hematochezia.   : No hematuria.   Integument: Negative.   Psychiatric: Negative.   Hematologic:  Easy bruising, no easy bleeding.  Neuro: Negative.   Endocrinology: No significant heat or cold intolerance   Musculoskeletal: No myalgia.    Exam:  /84 (BP Location: Left arm, Patient Position: Sitting, Cuff Size: Adult Large)   Pulse 107   Ht 1.845 m (6' 0.64\")   Wt 95.7 kg (211 lb)   SpO2 96%   BMI 28.12 kg/m    GENERAL APPEARANCE: healthy, alert and no distress  HEENT: no icterus, no xanthelasmas, normal pupil size and reaction, normal palate, mucosa moist, no central cyanosis  NECK: no adenopathy, no asymmetry, masses, or scars, thyroid normal to palpation and no bruits, JVP not elevated  RESPIRATORY: lungs clear to auscultation - no rales, rhonchi or wheezes, no use of accessory muscles, no retractions, respirations are unlabored, normal respiratory rate  CARDIOVASCULAR: regular rhythm, normal S1 with physiologic split S2, no S3 or S4 and no murmur, click " or rub, precordium quiet with normal PMI.  ABDOMEN: soft, non tender, without hepatosplenomegaly, no masses palpable, bowel sounds normal, aorta not enlarged by palpation, no abdominal bruits  EXTREMITIES: peripheral pulses normal, no edema, no bruits  NEURO: alert and oriented to person/place/time, normal speech, gait and affect  VASC: Radial, femoral, dorsalis pedis and posterior tibialis pulses are normal in volumes and symmetric bilaterally. No bruits are heard.  SKIN: no ecchymoses, no rashes    Labs:  CBC RESULTS:   Lab Results   Component Value Date    WBC 6.0 02/03/2022    WBC 8.8 04/12/2013    RBC 5.58 02/03/2022    RBC 5.85 04/12/2013    HGB 15.4 02/03/2022    HGB 16.8 04/12/2013    HCT 46.5 02/03/2022    HCT 51.5 04/12/2013    MCV 83 02/03/2022    MCV 88 04/12/2013    MCH 27.6 02/03/2022    MCH 28.6 04/12/2013    MCHC 33.1 02/03/2022    MCHC 32.5 04/12/2013    RDW 12.9 02/03/2022    RDW 13.7 04/12/2013     02/03/2022     04/12/2013       BMP RESULTS:  Lab Results   Component Value Date     02/03/2022     02/01/2021    POTASSIUM 4.1 02/03/2022    POTASSIUM 4.7 02/01/2021    CHLORIDE 110 (H) 02/03/2022    CHLORIDE 108 02/01/2021    CO2 26 02/03/2022    CO2 31 02/01/2021    ANIONGAP 2 (L) 02/03/2022    ANIONGAP 1 (L) 02/01/2021     (H) 02/03/2022     (H) 02/01/2021    BUN 16 02/03/2022    BUN 15 02/01/2021    CR 0.79 02/03/2022    CR 0.75 02/01/2021    GFRESTIMATED >90 02/03/2022    GFRESTIMATED >90 02/01/2021    GFRESTBLACK >90 02/01/2021    ROMERO 9.4 02/03/2022    ROMERO 9.5 02/01/2021        INR RESULTS:  No results found for: INR    Procedures:  TTE on 11/29/2021: Reviewed.  Interpretation Summary     Global and regional left ventricular function is normal with an EF of 55-60%.  Global right ventricular function is normal. The right ventricle is normal  size.  No significant valvular abnormalities.  No pericardial effusion is present.  There is no prior study for direct  comparison.    Zio patch in 11/2021: Reviewed.        Zio patch in 3-4-2022: Reviewed.          ECG on 5/9/2022: Reviewed.      Assessment and Plan:   # HTN  # HLD  # GI reflux  # Ffrequent PVCs.  Less symptomatic. [place] = 17.8% per Zio patch in 11/2021.  -> s/p successful catheter ablation of PVCs originating from the posteroseptal, posterolateral, and anterolateral aspects of the mitral annulus and unsuccessful catheter ablation of PVCs originating from the LV summit on 2/3/2022.  -> PVC burden = <1.0% per repeat Zio patch in 3-4/2022  He has been doing well since the ablation.  I advised him to call us if he has any concerns.  Otherwise, no regular follow up will be required.    I spent a total of 20 min today to review the records, see the patient, and complete the documents.    CC  Patient Care Team:  Kristen Uribe MD as PCP - General (Family Practice)  Kristen Uribe MD as Assigned PCP  Joao Eduardo DO as Assigned Musculoskeletal Provider  Opal Cornejo RN as Specialty Care Coordinator (Cardiology)  Eli Morel MD (Cardiovascular Disease)  Eli Morel MD as Assigned Heart and Vascular Provider  KRISTEN URIBE MD

## 2022-05-09 NOTE — PROGRESS NOTES
HPI:   Kody Salas is a 61 year old male with a pmhx of HTN, HLD, GI reflux and frequent PVCs.  He was referred by Dr. Brown to consider whether he might be a candidate for catheter ablation of PVCs.  He was diagnosed with PVCs in 3/2021.  He is less symptomatic with PVCs.  He denied any chest pain or SOB and complained of lightheadedness.  He is on Atorvastatin 20mg daily and Lisinopril 5mg daily.    He underwent successful catheter ablation of PVCs originating from the posteroseptal, posterolateral, and anterolateral aspects of the mitral annulus and unsuccessful catheter ablation of PVCs originating from the LV summit on 2/3/2022.  Since then he has been doing well and is now seen for a follow up.    PAST MEDICAL HISTORY:  Past Medical History:   Diagnosis Date     Arthritis      High cholesterol      Hypertension      Malignant melanoma of left upper extremity including shoulder (H) 7/13/2018       CURRENT MEDICATIONS:  Current Outpatient Medications   Medication Sig Dispense Refill     atorvastatin (LIPITOR) 20 MG tablet Take 1 tablet (20 mg) by mouth daily 90 tablet 0     lisinopril (ZESTRIL) 5 MG tablet Take 1 tablet (5 mg) by mouth daily 90 tablet 0     mometasone (ELOCON) 0.1 % external cream APPLY TO AFFECTED AREA DAILY AS DIRECTED 45 g 1     omeprazole (PRILOSEC) 20 MG DR capsule Take 1 Capsule BY MOUTH EVERY DAY  0     lisinopril (ZESTRIL) 10 MG tablet Take 1 tablet (10 mg) by mouth daily (Patient not taking: Reported on 5/9/2022) 90 tablet 3       PAST SURGICAL HISTORY:  Past Surgical History:   Procedure Laterality Date     COLONOSCOPY WITH CO2 INSUFFLATION N/A 2/19/2018    Procedure: COLONOSCOPY WITH CO2 INSUFFLATION;  COLON-SCREENING / ASHER ;  Surgeon: Brandon Ruby MD;  Location: [facility]     EP ABLATION PVC N/A 2/3/2022    Procedure: EP ABLATION PVC;  Surgeon: Eli Morel MD;  Location:  [facility]     HC KNEE SCOPE,MED/LAT MENISCUS REPAIR  2003    right      "PROPHYLAXIS RETINA DETACH,CRYO/DIATH  2009       ALLERGIES:   No Known Allergies    FAMILY HISTORY:  + Premature coronary artery disease  - Atrial fibrillation  + Sudden cardiac death     SOCIAL HISTORY:  Social History     Tobacco Use     Smoking status: Former Smoker     Packs/day: 1.00     Years: 35.00     Pack years: 35.00     Quit date: 6/15/2012     Years since quittin.9     Smokeless tobacco: Never Used   Vaping Use     Vaping Use: Never used   Substance Use Topics     Alcohol use: No     Comment: quit 2015     Drug use: No       ROS:   Answers for HPI/ROS submitted by the patient on 2022 were reviewed.  Constitutional: No fever, chills, or sweats. Weight stable.   ENT: No visual disturbance, ear ache, epistaxis, sore throat.   Cardiovascular: As per HPI.   Respiratory: No cough, hemoptysis.    GI: No nausea, vomiting, hematemesis, melena, or hematochezia.   : No hematuria.   Integument: Negative.   Psychiatric: Negative.   Hematologic:  Easy bruising, no easy bleeding.  Neuro: Negative.   Endocrinology: No significant heat or cold intolerance   Musculoskeletal: No myalgia.    Exam:  /84 (BP Location: Left arm, Patient Position: Sitting, Cuff Size: Adult Large)   Pulse 107   Ht 1.845 m (6' 0.64\")   Wt 95.7 kg (211 lb)   SpO2 96%   BMI 28.12 kg/m    GENERAL APPEARANCE: healthy, alert and no distress  HEENT: no icterus, no xanthelasmas, normal pupil size and reaction, normal palate, mucosa moist, no central cyanosis  NECK: no adenopathy, no asymmetry, masses, or scars, thyroid normal to palpation and no bruits, JVP not elevated  RESPIRATORY: lungs clear to auscultation - no rales, rhonchi or wheezes, no use of accessory muscles, no retractions, respirations are unlabored, normal respiratory rate  CARDIOVASCULAR: regular rhythm, normal S1 with physiologic split S2, no S3 or S4 and no murmur, click or rub, precordium quiet with normal PMI.  ABDOMEN: soft, non tender, without " hepatosplenomegaly, no masses palpable, bowel sounds normal, aorta not enlarged by palpation, no abdominal bruits  EXTREMITIES: peripheral pulses normal, no edema, no bruits  NEURO: alert and oriented to person/place/time, normal speech, gait and affect  VASC: Radial, femoral, dorsalis pedis and posterior tibialis pulses are normal in volumes and symmetric bilaterally. No bruits are heard.  SKIN: no ecchymoses, no rashes    Labs:  CBC RESULTS:   Lab Results   Component Value Date    WBC 6.0 02/03/2022    WBC 8.8 04/12/2013    RBC 5.58 02/03/2022    RBC 5.85 04/12/2013    HGB 15.4 02/03/2022    HGB 16.8 04/12/2013    HCT 46.5 02/03/2022    HCT 51.5 04/12/2013    MCV 83 02/03/2022    MCV 88 04/12/2013    MCH 27.6 02/03/2022    MCH 28.6 04/12/2013    MCHC 33.1 02/03/2022    MCHC 32.5 04/12/2013    RDW 12.9 02/03/2022    RDW 13.7 04/12/2013     02/03/2022     04/12/2013       BMP RESULTS:  Lab Results   Component Value Date     02/03/2022     02/01/2021    POTASSIUM 4.1 02/03/2022    POTASSIUM 4.7 02/01/2021    CHLORIDE 110 (H) 02/03/2022    CHLORIDE 108 02/01/2021    CO2 26 02/03/2022    CO2 31 02/01/2021    ANIONGAP 2 (L) 02/03/2022    ANIONGAP 1 (L) 02/01/2021     (H) 02/03/2022     (H) 02/01/2021    BUN 16 02/03/2022    BUN 15 02/01/2021    CR 0.79 02/03/2022    CR 0.75 02/01/2021    GFRESTIMATED >90 02/03/2022    GFRESTIMATED >90 02/01/2021    GFRESTBLACK >90 02/01/2021    ROMERO 9.4 02/03/2022    ROMERO 9.5 02/01/2021        INR RESULTS:  No results found for: INR    Procedures:  TTE on 11/29/2021: Reviewed.  Interpretation Summary     Global and regional left ventricular function is normal with an EF of 55-60%.  Global right ventricular function is normal. The right ventricle is normal  size.  No significant valvular abnormalities.  No pericardial effusion is present.  There is no prior study for direct comparison.    Zio patch in 11/2021: Reviewed.        Zio patch in 3-4-2022:  Reviewed.          ECG on 5/9/2022: Reviewed.      Assessment and Plan:   # HTN  # HLD  # GI reflux  # Ffrequent PVCs.  Less symptomatic. [place] = 17.8% per Zio patch in 11/2021.  -> s/p successful catheter ablation of PVCs originating from the posteroseptal, posterolateral, and anterolateral aspects of the mitral annulus and unsuccessful catheter ablation of PVCs originating from the LV summit on 2/3/2022.  -> PVC burden = <1.0% per repeat Zio patch in 3-4/2022  He has been doing well since the ablation.  I advised him to call us if he has any concerns.  Otherwise, no regular follow up will be required.    I spent a total of 20 min today to review the records, see the patient, and complete the documents.    CC  Patient Care Team:  Kristen Uribe MD as PCP - General (Family Practice)  Kristen Uribe MD as Assigned PCP  Joao Eduardo DO as Assigned Musculoskeletal Provider  Opal Cornejo, RN as Specialty Care Coordinator (Cardiology)  Eli Morel MD (Cardiovascular Disease)  Eli Morel MD as Assigned Heart and Vascular Provider  KRISTEN URIBE

## 2022-05-09 NOTE — PATIENT INSTRUCTIONS
You were seen in the Electrophysiology Clinic today by: Dr Morel    Plan:       Follow up visit:  As needed with Cardiology        Your Care Team:  EP Cardiology   Telephone Number     Nurse Line  Nely Lorenzo RN  [phone]     For scheduling appts or procedures:    Prema Caraballo   [phone]   For the Device Clinic (Pacemakers, ICDs, Loop Recorders)    During business hours: [phone]  After business hours:   [phone]- select option 4 and ask for job code [id number].     On-call cardiologist for after hours or on weekends: [phone], option #4, and ask to speak to the on-call cardiologist.     Cardiovascular Clinic:   [address] [facility] [address]      As always, Thank you for trusting us with your health care needs!

## 2022-05-09 NOTE — NURSING NOTE
Chief Complaint   Patient presents with     Follow Up     3 mo s/p pvc ablation, review zio, previously 17.8%       Vitals were taken, medications reconciled, and EKG performed.    Jerad Paniagua  1:06 PM

## 2022-05-10 LAB
ATRIAL RATE - MUSE: 95 BPM
DIASTOLIC BLOOD PRESSURE - MUSE: NORMAL MMHG
INTERPRETATION ECG - MUSE: NORMAL
P AXIS - MUSE: 59 DEGREES
PR INTERVAL - MUSE: 142 MS
QRS DURATION - MUSE: 126 MS
QT - MUSE: 360 MS
QTC - MUSE: 452 MS
R AXIS - MUSE: 63 DEGREES
SYSTOLIC BLOOD PRESSURE - MUSE: NORMAL MMHG
T AXIS - MUSE: 8 DEGREES
VENTRICULAR RATE- MUSE: 95 BPM

## 2023-02-12 DIAGNOSIS — L30.9 ECZEMA, UNSPECIFIED TYPE: ICD-10-CM

## 2023-02-13 RX ORDER — MOMETASONE FUROATE 1 MG/G
CREAM TOPICAL
OUTPATIENT
Start: 2023-02-13

## 2023-02-28 DIAGNOSIS — L30.9 ECZEMA, UNSPECIFIED TYPE: ICD-10-CM

## 2023-02-28 RX ORDER — MOMETASONE FUROATE 1 MG/G
CREAM TOPICAL
Qty: 45 G | Refills: 0 | Status: SHIPPED | OUTPATIENT
Start: 2023-02-28 | End: 2023-09-07

## 2023-03-19 ASSESSMENT — ENCOUNTER SYMPTOMS
CONSTIPATION: 0
NAUSEA: 0
CHILLS: 0
HEARTBURN: 0
ABDOMINAL PAIN: 0
WEAKNESS: 0
EYE PAIN: 0
COUGH: 0
DYSURIA: 0
PARESTHESIAS: 0
PALPITATIONS: 0
NERVOUS/ANXIOUS: 0
SHORTNESS OF BREATH: 0
HEMATURIA: 0
MYALGIAS: 0
DIZZINESS: 0
HEADACHES: 0
FEVER: 0
ARTHRALGIAS: 1
FREQUENCY: 0
DIARRHEA: 0
HEMATOCHEZIA: 0
SORE THROAT: 0

## 2023-03-20 ENCOUNTER — OFFICE VISIT (OUTPATIENT)
Dept: FAMILY MEDICINE | Facility: CLINIC | Age: 63
End: 2023-03-20
Payer: COMMERCIAL

## 2023-03-20 VITALS
DIASTOLIC BLOOD PRESSURE: 80 MMHG | HEIGHT: 74 IN | OXYGEN SATURATION: 96 % | SYSTOLIC BLOOD PRESSURE: 116 MMHG | TEMPERATURE: 98 F | WEIGHT: 203 LBS | RESPIRATION RATE: 18 BRPM | HEART RATE: 92 BPM | BODY MASS INDEX: 26.05 KG/M2

## 2023-03-20 DIAGNOSIS — F10.21 RECOVERING ALCOHOLIC IN REMISSION (H): ICD-10-CM

## 2023-03-20 DIAGNOSIS — C43.62 MALIGNANT MELANOMA OF LEFT UPPER EXTREMITY INCLUDING SHOULDER (H): ICD-10-CM

## 2023-03-20 DIAGNOSIS — Z00.00 ROUTINE GENERAL MEDICAL EXAMINATION AT A HEALTH CARE FACILITY: Primary | ICD-10-CM

## 2023-03-20 DIAGNOSIS — E78.5 HYPERLIPIDEMIA LDL GOAL <100: ICD-10-CM

## 2023-03-20 DIAGNOSIS — I47.29 PAROXYSMAL VENTRICULAR TACHYCARDIA (H): ICD-10-CM

## 2023-03-20 DIAGNOSIS — I10 HYPERTENSION GOAL BP (BLOOD PRESSURE) < 140/90: ICD-10-CM

## 2023-03-20 PROBLEM — I47.20 PAROXYSMAL VENTRICULAR TACHYCARDIA (H): Status: ACTIVE | Noted: 2023-03-20

## 2023-03-20 LAB
ALBUMIN SERPL-MCNC: 4 G/DL (ref 3.4–5)
ALP SERPL-CCNC: 81 U/L (ref 40–150)
ALT SERPL W P-5'-P-CCNC: 44 U/L (ref 0–70)
ANION GAP SERPL CALCULATED.3IONS-SCNC: 1 MMOL/L (ref 3–14)
AST SERPL W P-5'-P-CCNC: 24 U/L (ref 0–45)
BILIRUB SERPL-MCNC: 0.4 MG/DL (ref 0.2–1.3)
BUN SERPL-MCNC: 13 MG/DL (ref 7–30)
CALCIUM SERPL-MCNC: 9.1 MG/DL (ref 8.5–10.1)
CHLORIDE BLD-SCNC: 108 MMOL/L (ref 94–109)
CHOLEST SERPL-MCNC: 137 MG/DL
CO2 SERPL-SCNC: 30 MMOL/L (ref 20–32)
CREAT SERPL-MCNC: 0.73 MG/DL (ref 0.66–1.25)
FASTING STATUS PATIENT QL REPORTED: YES
GFR SERPL CREATININE-BSD FRML MDRD: >90 ML/MIN/1.73M2
GLUCOSE BLD-MCNC: 112 MG/DL (ref 70–99)
HDLC SERPL-MCNC: 46 MG/DL
LDLC SERPL CALC-MCNC: 83 MG/DL
NONHDLC SERPL-MCNC: 91 MG/DL
POTASSIUM BLD-SCNC: 4.5 MMOL/L (ref 3.4–5.3)
PROT SERPL-MCNC: 6.7 G/DL (ref 6.8–8.8)
SODIUM SERPL-SCNC: 139 MMOL/L (ref 133–144)
TRIGL SERPL-MCNC: 42 MG/DL

## 2023-03-20 PROCEDURE — 80061 LIPID PANEL: CPT | Performed by: FAMILY MEDICINE

## 2023-03-20 PROCEDURE — 80053 COMPREHEN METABOLIC PANEL: CPT | Performed by: FAMILY MEDICINE

## 2023-03-20 PROCEDURE — 99214 OFFICE O/P EST MOD 30 MIN: CPT | Mod: 25 | Performed by: FAMILY MEDICINE

## 2023-03-20 PROCEDURE — 36415 COLL VENOUS BLD VENIPUNCTURE: CPT | Performed by: FAMILY MEDICINE

## 2023-03-20 PROCEDURE — 99396 PREV VISIT EST AGE 40-64: CPT | Performed by: FAMILY MEDICINE

## 2023-03-20 RX ORDER — ATORVASTATIN CALCIUM 20 MG/1
20 TABLET, FILM COATED ORAL DAILY
Qty: 90 TABLET | Refills: 3 | Status: SHIPPED | OUTPATIENT
Start: 2023-03-20 | End: 2024-04-22

## 2023-03-20 RX ORDER — LISINOPRIL 5 MG/1
5 TABLET ORAL DAILY
Qty: 90 TABLET | Refills: 3 | Status: SHIPPED | OUTPATIENT
Start: 2023-03-20 | End: 2024-04-15

## 2023-03-20 ASSESSMENT — ANXIETY QUESTIONNAIRES
GAD7 TOTAL SCORE: 0
7. FEELING AFRAID AS IF SOMETHING AWFUL MIGHT HAPPEN: NOT AT ALL
1. FEELING NERVOUS, ANXIOUS, OR ON EDGE: NOT AT ALL
IF YOU CHECKED OFF ANY PROBLEMS ON THIS QUESTIONNAIRE, HOW DIFFICULT HAVE THESE PROBLEMS MADE IT FOR YOU TO DO YOUR WORK, TAKE CARE OF THINGS AT HOME, OR GET ALONG WITH OTHER PEOPLE: NOT DIFFICULT AT ALL
GAD7 TOTAL SCORE: 0
4. TROUBLE RELAXING: NOT AT ALL
7. FEELING AFRAID AS IF SOMETHING AWFUL MIGHT HAPPEN: NOT AT ALL
2. NOT BEING ABLE TO STOP OR CONTROL WORRYING: NOT AT ALL
6. BECOMING EASILY ANNOYED OR IRRITABLE: NOT AT ALL
8. IF YOU CHECKED OFF ANY PROBLEMS, HOW DIFFICULT HAVE THESE MADE IT FOR YOU TO DO YOUR WORK, TAKE CARE OF THINGS AT HOME, OR GET ALONG WITH OTHER PEOPLE?: NOT DIFFICULT AT ALL
5. BEING SO RESTLESS THAT IT IS HARD TO SIT STILL: NOT AT ALL
3. WORRYING TOO MUCH ABOUT DIFFERENT THINGS: NOT AT ALL
GAD7 TOTAL SCORE: 0

## 2023-03-20 ASSESSMENT — PAIN SCALES - GENERAL: PAINLEVEL: NO PAIN (1)

## 2023-03-20 ASSESSMENT — PATIENT HEALTH QUESTIONNAIRE - PHQ9
SUM OF ALL RESPONSES TO PHQ QUESTIONS 1-9: 0
SUM OF ALL RESPONSES TO PHQ QUESTIONS 1-9: 0
10. IF YOU CHECKED OFF ANY PROBLEMS, HOW DIFFICULT HAVE THESE PROBLEMS MADE IT FOR YOU TO DO YOUR WORK, TAKE CARE OF THINGS AT HOME, OR GET ALONG WITH OTHER PEOPLE: NOT DIFFICULT AT ALL

## 2023-03-20 NOTE — PROGRESS NOTES
SUBJECTIVE:   CC: Shaggy is an 62 year old who presents for preventative health visit.   Patient has been advised of split billing requirements and indicates understanding: Yes  Healthy Habits:     Getting at least 3 servings of Calcium per day:  Yes    Bi-annual eye exam:  Yes    Dental care twice a year:  Yes    Sleep apnea or symptoms of sleep apnea:  None    Diet:  Regular (no restrictions)    Frequency of exercise:  4-5 days/week    Duration of exercise:  15-30 minutes    Taking medications regularly:  Yes    Barriers to taking medications:  None    Medication side effects:  None    PHQ-2 Total Score: 0    Additional concerns today:  Yes (Med refills. )    New patient to me.  Referred to me by his wife, who is my patient.     HYPERLIPIDEMIA - Patient has a long history of significant Hyperlipidemia requiring medication for treatment with recent good control. Patient reports no problems or side effects with the medication- Atorvastatin.   Last lipid panel-  Recent Labs   Lab Test 02/01/21  0852 01/13/20  0808   CHOL 180 166   HDL 51 46   * 96   TRIG 103 121         HYPERTENSION - Patient has longstanding history of HTN , currently denies any symptoms referable to elevated blood pressure. Specifically denies chest pain, palpitations, dyspnea, orthopnea, PND or peripheral edema. Blood pressure readings have been in normal range. Current medication regimen is as listed below. Patient denies any side effects of medication- Lisinopril 5 mg/day.     BP Readings from Last 3 Encounters:   03/20/23 116/80   05/09/22 120/84   02/03/22 (!) 142/96       History of PVCs s/p successful catheter ablation of PVCs - has been doing well since then.       History of malignant melanoma of the left upper extremity including the shoulder s/p excision.  Follows with dermatology on an annual basis.  Reports that he has an upcoming appointment with dermatology in May ([facility]    Patient is a  recovering alcoholic currently in remission-reports that his last ETOH drink was about 4 years ago 23 days.      HEALTH CARE MAINTENANCE: Due for labs. Declines HIV screening, lung cancer screening at this time and vaccinations to include influenza, zoster, COVID and pneumonia vaccine.        Today's PHQ-2 Score:   PHQ-2 (  [employer]) 3/19/2023   Q1: Little interest or pleasure in doing things 0   Q2: Feeling down, depressed or hopeless 0   PHQ-2 Score 0   PHQ-2 Total Score (12-17 Years)- Positive if 3 or more points; Administer PHQ-A if positive -   Q1: Little interest or pleasure in doing things Not at all   Q2: Feeling down, depressed or hopeless Not at all   PHQ-2 Score 0           Social History     Tobacco Use     Smoking status: Former     Packs/day: 1.00     Years: 2.00     Pack years: 2.00     Types: Cigarettes, Other     Start date: 3/31/2019     Quit date: 3/11/2021     Years since quittin.0     Smokeless tobacco: Never     Tobacco comments:     Off and on for many years. 1.5 years since last clean lung scan   Substance Use Topics     Alcohol use: Not Currently     Comment: Quit entirely on 2019         Alcohol Use 3/19/2023   Prescreen: >3 drinks/day or >7 drinks/week? Not Applicable       Last PSA:   PSA   Date Value Ref Range Status   2021 0.50 0 - 4 ug/L Final     Comment:     Assay Method:  Chemiluminescence using Siemens Vista analyzer       Reviewed orders with patient. Reviewed health maintenance and updated orders accordingly - Yes  Lab work is in process  Labs reviewed in EPIC  BP Readings from Last 3 Encounters:   23 116/80   22 120/84   22 (!) 142/96    Wt Readings from Last 3 Encounters:   23 92.1 kg (203 lb)   22 95.7 kg (211 lb)   22 104.3 kg (230 lb)                  Patient Active Problem List   Diagnosis     Hyperlipidemia LDL goal <100     Seasonal allergies     Hypertension goal BP (blood pressure) < 140/90     GABE (generalized  anxiety disorder)     GERD (gastroesophageal reflux disease)     Eczema     Obesity     History of ETOH abuse     Chronic back pain     Impaired glucose tolerance     Anxiety     Malignant melanoma of left upper extremity including shoulder (H)     Alcohol abuse, in remission     Recovering alcoholic in remission (H)     PVC's (premature ventricular contractions)     Paroxysmal ventricular tachycardia     Past Surgical History:   Procedure Laterality Date     BIOPSY  2017    Skin Cancer     COLONOSCOPY  2016    Clear     COLONOSCOPY WITH CO2 INSUFFLATION N/A 2018    Procedure: COLONOSCOPY WITH CO2 INSUFFLATION;  COLON-SCREENING / ASHER ;  Surgeon: Brandon uRby MD;  Location: [facility] OR     EP ABLATION PVC N/A 2022    Procedure: EP ABLATION PVC;  Surgeon: Eli Morel MD;  Location:  [facility]     HC KNEE SCOPE,MED/LAT MENISCUS REPAIR      right     PROPHYLAXIS RETINA DETACH,CRYO/DIATH  2009       Social History     Tobacco Use     Smoking status: Former     Packs/day: 1.00     Years: 2.00     Pack years: 2.00     Types: Cigarettes, Other     Start date: 3/31/2019     Quit date: 3/11/2021     Years since quittin.0     Smokeless tobacco: Never     Tobacco comments:     Off and on for many years. 1.5 years since last clean lung scan   Substance Use Topics     Alcohol use: Not Currently     Comment: Quit entirely on 2019     Family History   Problem Relation Age of Onset     Coronary Artery Disease Mother      Hypertension Mother      Hypertension Father      Coronary Artery Disease Brother         MI age 45     Hypertension Brother      Hypertension Maternal Grandmother      Hypertension Maternal Grandfather      Hypertension Paternal Grandmother      Hypertension Paternal Grandfather      Heart Disease Paternal Grandfather      Anesthesia Reaction No family hx of          Current Outpatient Medications   Medication Sig Dispense Refill     atorvastatin (LIPITOR)  "20 MG tablet Take 1 tablet (20 mg) by mouth daily Due to follow up 90 tablet 3     lisinopril (ZESTRIL) 5 MG tablet Take 1 tablet (5 mg) by mouth daily 90 tablet 3     mometasone (ELOCON) 0.1 % external cream APPLY TO AFFECTED AREA DAILY AS DIRECTED. 45 g 0     omeprazole (PRILOSEC) 20 MG DR capsule Take 1 Capsule BY MOUTH EVERY DAY  0     No Known Allergies    Reviewed and updated as needed this visit by clinical staff   Tobacco   Meds   Med Hx   Fam Hx          Reviewed and updated as needed this visit by Provider   Tobacco     Med Hx   Fam Hx             Review of Systems  CONSTITUTIONAL: NEGATIVE for fever, chills, change in weight  INTEGUMENTARY/SKIN: NEGATIVE for worrisome rashes, moles or lesions  EYES: NEGATIVE for vision changes or irritation  ENT: NEGATIVE for ear, mouth and throat problems  RESP: NEGATIVE for significant cough or SOB  CV: NEGATIVE for chest pain, palpitations or peripheral edema  GI: NEGATIVE for nausea, abdominal pain, heartburn, or change in bowel habits   male: negative for dysuria, hematuria, decreased urinary stream, erectile dysfunction, urethral discharge  MUSCULOSKELETAL: NEGATIVE for significant arthralgias or myalgia  NEURO: NEGATIVE for weakness, dizziness or paresthesias  PSYCHIATRIC: NEGATIVE for changes in mood or affect    OBJECTIVE:   /80   Pulse 92   Temp 98  F (36.7  C) (Tympanic)   Resp 18   Ht 1.88 m (6' 2.02\")   Wt 92.1 kg (203 lb)   SpO2 96%   BMI 26.05 kg/m      Physical Exam  GENERAL: healthy, alert and no distress  EYES: Eyes grossly normal to inspection, PERRL and conjunctivae and sclerae normal  HENT: ear canals and TM's normal, nose and mouth without ulcers or lesions  NECK: no adenopathy, no asymmetry, masses, or scars and thyroid normal to palpation  RESP: lungs clear to auscultation - no rales, rhonchi or wheezes  CV: regular rate and rhythm, normal S1 S2, no S3 or S4, no murmur, click or rub, no peripheral edema and peripheral pulses " "strong  ABDOMEN: soft, nontender, no hepatosplenomegaly, no masses and bowel sounds normal  MS: no gross musculoskeletal defects noted, no edema  SKIN: no suspicious lesions or rashes  NEURO: Normal strength and tone, mentation intact and speech normal  PSYCH: mentation appears normal, affect normal/bright    Diagnostic Test Results:  Labs drawn and in process.    ASSESSMENT/PLAN:   Kody was seen today for physical.    Diagnoses and all orders for this visit:    Routine general medical examination at a health care facility  -     REVIEW OF HEALTH MAINTENANCE PROTOCOL ORDERS  -     Lipid panel reflex to direct LDL Fasting  -     Comprehensive metabolic panel (BMP + Alb, Alk Phos, ALT, AST, Total. Bili, TP)    Hypertension goal BP (blood pressure) < 140/90, controlled  -    Refill:  lisinopril (ZESTRIL) 5 MG tablet; Take 1 tablet (5 mg) by mouth daily  -     Encourage patient to keep a BP log at home.     Hyperlipidemia LDL goal <100  -     Refill: atorvastatin (LIPITOR) 20 MG tablet; Take 1 tablet (20 mg) by mouth daily Due to follow up  -     Lipid panel reflex to direct LDL Fasting  -     Comprehensive metabolic panel (BMP + Alb, Alk Phos, ALT, AST, Total. Bili, TP)    Paroxysmal ventricular tachycardia s/p successful catheter ablation 2/3/22.          -     Stable.  Doing well.     Malignant melanoma of left upper extremity including shoulder (H) s/p excision.            -  Following with Dermatology         Recovering alcoholic in remission (H)  -     DEPRESSION ACTION PLAN (DAP)  -     Last drink 2019. Congratulated patient        Patient has been advised of split billing requirements and indicates understanding: Yes      COUNSELING:   Reviewed preventive health counseling, as reflected in patient instructions       Regular exercise       Healthy diet/nutrition      BMI:   Estimated body mass index is 26.05 kg/m  as calculated from the following:    Height as of this encounter: 1.88 m (6' 2.02\").    Weight as " of this encounter: 92.1 kg (203 lb).   Weight management plan: Discussed healthy diet and exercise guidelines      He reports that he quit smoking about 2 years ago. His smoking use included cigarettes and other. He started smoking about 3 years ago. He has a 2.00 pack-year smoking history. He has never used smokeless tobacco.    Follow up annually and as needed thoughout the year.      Francine Pastor MD  [facility] for HPI/ROS submitted by the patient on 3/20/2023  If you checked off any problems, how difficult have these problems made it for you to do your work, take care of things at home, or get along with other people?: Not difficult at all  PHQ9 TOTAL SCORE: 0  GABE 7 TOTAL SCORE: 0

## 2023-04-22 ENCOUNTER — HEALTH MAINTENANCE LETTER (OUTPATIENT)
Age: 63
End: 2023-04-22

## 2023-09-03 DIAGNOSIS — L30.9 ECZEMA, UNSPECIFIED TYPE: ICD-10-CM

## 2023-09-07 RX ORDER — MOMETASONE FUROATE 1 MG/G
CREAM TOPICAL
Qty: 45 G | Refills: 0 | Status: SHIPPED | OUTPATIENT
Start: 2023-09-07 | End: 2024-01-31

## 2024-01-09 ENCOUNTER — E-VISIT (OUTPATIENT)
Dept: FAMILY MEDICINE | Facility: CLINIC | Age: 64
End: 2024-01-09
Payer: COMMERCIAL

## 2024-01-09 DIAGNOSIS — J01.90 ACUTE RHINOSINUSITIS: Primary | ICD-10-CM

## 2024-01-09 PROCEDURE — 99421 OL DIG E/M SVC 5-10 MIN: CPT | Performed by: FAMILY MEDICINE

## 2024-01-09 RX ORDER — DOXYCYCLINE HYCLATE 100 MG
100 TABLET ORAL 2 TIMES DAILY
Qty: 14 TABLET | Refills: 0 | Status: SHIPPED | OUTPATIENT
Start: 2024-01-09 | End: 2024-01-16

## 2024-01-09 NOTE — PATIENT INSTRUCTIONS
Thank you for choosing us for your care. I have placed an order for a prescription so that you can start treatment. View your full visit summary for details by clicking on the link below. Your pharmacist will able to address any questions you may have about the medication.     If you're not feeling better within 5-7 days, please schedule an appointment.  You can schedule an appointment right here in [facility], or call [phone]  If the visit is for the same symptoms as your eVisit, we'll refund the cost of your eVisit if seen within seven days.

## 2024-01-30 DIAGNOSIS — L30.9 ECZEMA, UNSPECIFIED TYPE: ICD-10-CM

## 2024-01-31 RX ORDER — MOMETASONE FUROATE 1 MG/G
CREAM TOPICAL
Qty: 45 G | Refills: 3 | Status: SHIPPED | OUTPATIENT
Start: 2024-01-31

## 2024-02-19 ENCOUNTER — PATIENT OUTREACH (OUTPATIENT)
Dept: CARE COORDINATION | Facility: CLINIC | Age: 64
End: 2024-02-19
Payer: COMMERCIAL

## 2024-03-04 ENCOUNTER — PATIENT OUTREACH (OUTPATIENT)
Dept: CARE COORDINATION | Facility: CLINIC | Age: 64
End: 2024-03-04
Payer: COMMERCIAL

## 2024-04-14 DIAGNOSIS — I10 HYPERTENSION GOAL BP (BLOOD PRESSURE) < 140/90: ICD-10-CM

## 2024-04-15 RX ORDER — LISINOPRIL 5 MG/1
5 TABLET ORAL DAILY
Qty: 90 TABLET | Refills: 0 | Status: SHIPPED | OUTPATIENT
Start: 2024-04-15 | End: 2024-06-11

## 2024-04-19 ENCOUNTER — TRANSFERRED RECORDS (OUTPATIENT)
Dept: HEALTH INFORMATION MANAGEMENT | Facility: CLINIC | Age: 64
End: 2024-04-19
Payer: COMMERCIAL

## 2024-04-20 ENCOUNTER — HEALTH MAINTENANCE LETTER (OUTPATIENT)
Age: 64
End: 2024-04-20

## 2024-04-22 DIAGNOSIS — E78.5 HYPERLIPIDEMIA LDL GOAL <100: ICD-10-CM

## 2024-04-22 RX ORDER — ATORVASTATIN CALCIUM 20 MG/1
TABLET, FILM COATED ORAL
Qty: 90 TABLET | Refills: 0 | Status: SHIPPED | OUTPATIENT
Start: 2024-04-22 | End: 2024-06-11

## 2024-06-10 SDOH — HEALTH STABILITY: PHYSICAL HEALTH: ON AVERAGE, HOW MANY MINUTES DO YOU ENGAGE IN EXERCISE AT THIS LEVEL?: 150+ MIN

## 2024-06-10 SDOH — HEALTH STABILITY: PHYSICAL HEALTH: ON AVERAGE, HOW MANY DAYS PER WEEK DO YOU ENGAGE IN MODERATE TO STRENUOUS EXERCISE (LIKE A BRISK WALK)?: 3 DAYS

## 2024-06-10 ASSESSMENT — ANXIETY QUESTIONNAIRES
7. FEELING AFRAID AS IF SOMETHING AWFUL MIGHT HAPPEN: NOT AT ALL
GAD7 TOTAL SCORE: 0
3. WORRYING TOO MUCH ABOUT DIFFERENT THINGS: NOT AT ALL
GAD7 TOTAL SCORE: 0
2. NOT BEING ABLE TO STOP OR CONTROL WORRYING: NOT AT ALL
8. IF YOU CHECKED OFF ANY PROBLEMS, HOW DIFFICULT HAVE THESE MADE IT FOR YOU TO DO YOUR WORK, TAKE CARE OF THINGS AT HOME, OR GET ALONG WITH OTHER PEOPLE?: NOT DIFFICULT AT ALL
6. BECOMING EASILY ANNOYED OR IRRITABLE: NOT AT ALL
5. BEING SO RESTLESS THAT IT IS HARD TO SIT STILL: NOT AT ALL
4. TROUBLE RELAXING: NOT AT ALL
7. FEELING AFRAID AS IF SOMETHING AWFUL MIGHT HAPPEN: NOT AT ALL
1. FEELING NERVOUS, ANXIOUS, OR ON EDGE: NOT AT ALL
GAD7 TOTAL SCORE: 0
IF YOU CHECKED OFF ANY PROBLEMS ON THIS QUESTIONNAIRE, HOW DIFFICULT HAVE THESE PROBLEMS MADE IT FOR YOU TO DO YOUR WORK, TAKE CARE OF THINGS AT HOME, OR GET ALONG WITH OTHER PEOPLE: NOT DIFFICULT AT ALL

## 2024-06-10 ASSESSMENT — SOCIAL DETERMINANTS OF HEALTH (SDOH): HOW OFTEN DO YOU GET TOGETHER WITH FRIENDS OR RELATIVES?: ONCE A WEEK

## 2024-06-11 ENCOUNTER — OFFICE VISIT (OUTPATIENT)
Dept: FAMILY MEDICINE | Facility: CLINIC | Age: 64
End: 2024-06-11
Payer: COMMERCIAL

## 2024-06-11 VITALS
WEIGHT: 205.4 LBS | DIASTOLIC BLOOD PRESSURE: 80 MMHG | SYSTOLIC BLOOD PRESSURE: 124 MMHG | OXYGEN SATURATION: 98 % | TEMPERATURE: 97.5 F | HEART RATE: 86 BPM | HEIGHT: 73 IN | RESPIRATION RATE: 16 BRPM | BODY MASS INDEX: 27.22 KG/M2

## 2024-06-11 DIAGNOSIS — Z87.891 PERSONAL HISTORY OF TOBACCO USE: ICD-10-CM

## 2024-06-11 DIAGNOSIS — C43.62 MALIGNANT MELANOMA OF LEFT UPPER EXTREMITY INCLUDING SHOULDER (H): ICD-10-CM

## 2024-06-11 DIAGNOSIS — I10 HYPERTENSION GOAL BP (BLOOD PRESSURE) < 140/90: ICD-10-CM

## 2024-06-11 DIAGNOSIS — Z00.00 ROUTINE GENERAL MEDICAL EXAMINATION AT A HEALTH CARE FACILITY: Primary | ICD-10-CM

## 2024-06-11 DIAGNOSIS — Z23 NEED FOR VACCINATION: ICD-10-CM

## 2024-06-11 DIAGNOSIS — E78.5 HYPERLIPIDEMIA LDL GOAL <100: ICD-10-CM

## 2024-06-11 DIAGNOSIS — I47.29 PAROXYSMAL VENTRICULAR TACHYCARDIA (H): ICD-10-CM

## 2024-06-11 DIAGNOSIS — M51.369 DDD (DEGENERATIVE DISC DISEASE), LUMBAR: ICD-10-CM

## 2024-06-11 PROBLEM — F10.21 RECOVERING ALCOHOLIC IN REMISSION (H): Status: RESOLVED | Noted: 2021-11-09 | Resolved: 2024-06-11

## 2024-06-11 LAB
ALBUMIN SERPL BCG-MCNC: 4.4 G/DL (ref 3.5–5.2)
ALP SERPL-CCNC: 77 U/L (ref 40–150)
ALT SERPL W P-5'-P-CCNC: 21 U/L (ref 0–70)
ANION GAP SERPL CALCULATED.3IONS-SCNC: 10 MMOL/L (ref 7–15)
AST SERPL W P-5'-P-CCNC: 21 U/L (ref 0–45)
BILIRUB SERPL-MCNC: 0.4 MG/DL
BUN SERPL-MCNC: 13.9 MG/DL (ref 8–23)
CALCIUM SERPL-MCNC: 9.3 MG/DL (ref 8.8–10.2)
CHLORIDE SERPL-SCNC: 108 MMOL/L (ref 98–107)
CHOLEST SERPL-MCNC: 141 MG/DL
CREAT SERPL-MCNC: 0.82 MG/DL (ref 0.51–1.17)
DEPRECATED HCO3 PLAS-SCNC: 25 MMOL/L (ref 22–29)
EGFRCR SERPLBLD CKD-EPI 2021: >90 ML/MIN/1.73M2
ERYTHROCYTE [DISTWIDTH] IN BLOOD BY AUTOMATED COUNT: 13 % (ref 10–15)
FASTING STATUS PATIENT QL REPORTED: YES
FASTING STATUS PATIENT QL REPORTED: YES
GLUCOSE SERPL-MCNC: 109 MG/DL (ref 70–99)
HCT VFR BLD AUTO: 42.7 % (ref 35–53)
HDLC SERPL-MCNC: 47 MG/DL
HGB BLD-MCNC: 14.6 G/DL (ref 11.7–17.7)
LDLC SERPL CALC-MCNC: 86 MG/DL
MCH RBC QN AUTO: 28 PG (ref 26.5–33)
MCHC RBC AUTO-ENTMCNC: 34.2 G/DL (ref 31.5–36.5)
MCV RBC AUTO: 82 FL (ref 78–100)
NONHDLC SERPL-MCNC: 94 MG/DL
PLATELET # BLD AUTO: 193 10E3/UL (ref 150–450)
POTASSIUM SERPL-SCNC: 4.8 MMOL/L (ref 3.4–5.3)
PROT SERPL-MCNC: 6.7 G/DL (ref 6.4–8.3)
RBC # BLD AUTO: 5.21 10E6/UL (ref 3.8–5.9)
SODIUM SERPL-SCNC: 143 MMOL/L (ref 135–145)
TRIGL SERPL-MCNC: 38 MG/DL
TSH SERPL DL<=0.005 MIU/L-ACNC: 2.85 UIU/ML (ref 0.3–4.2)
WBC # BLD AUTO: 4 10E3/UL (ref 4–11)

## 2024-06-11 PROCEDURE — 85027 COMPLETE CBC AUTOMATED: CPT | Performed by: FAMILY MEDICINE

## 2024-06-11 PROCEDURE — 99396 PREV VISIT EST AGE 40-64: CPT | Mod: 25 | Performed by: FAMILY MEDICINE

## 2024-06-11 PROCEDURE — 80061 LIPID PANEL: CPT | Performed by: FAMILY MEDICINE

## 2024-06-11 PROCEDURE — 90471 IMMUNIZATION ADMIN: CPT | Performed by: FAMILY MEDICINE

## 2024-06-11 PROCEDURE — 90715 TDAP VACCINE 7 YRS/> IM: CPT | Performed by: FAMILY MEDICINE

## 2024-06-11 PROCEDURE — 84443 ASSAY THYROID STIM HORMONE: CPT | Performed by: FAMILY MEDICINE

## 2024-06-11 PROCEDURE — G0296 VISIT TO DETERM LDCT ELIG: HCPCS | Performed by: FAMILY MEDICINE

## 2024-06-11 PROCEDURE — 80053 COMPREHEN METABOLIC PANEL: CPT | Performed by: FAMILY MEDICINE

## 2024-06-11 PROCEDURE — 99214 OFFICE O/P EST MOD 30 MIN: CPT | Mod: 25 | Performed by: FAMILY MEDICINE

## 2024-06-11 PROCEDURE — 36415 COLL VENOUS BLD VENIPUNCTURE: CPT | Performed by: FAMILY MEDICINE

## 2024-06-11 RX ORDER — LISINOPRIL 5 MG/1
5 TABLET ORAL DAILY
Qty: 90 TABLET | Refills: 3 | Status: SHIPPED | OUTPATIENT
Start: 2024-06-11

## 2024-06-11 RX ORDER — ATORVASTATIN CALCIUM 20 MG/1
20 TABLET, FILM COATED ORAL DAILY
Qty: 90 TABLET | Refills: 3 | Status: SHIPPED | OUTPATIENT
Start: 2024-06-11

## 2024-06-11 ASSESSMENT — PAIN SCALES - GENERAL: PAINLEVEL: NO PAIN (0)

## 2024-06-11 NOTE — PROGRESS NOTES
"Preventive Care Visit  [facility] NAREN Pastor MD, Family Medicine  Jun 11, 2024      Assessment & Plan     Shaggy was seen today for physical and musculoskeletal problem.    Diagnoses and all orders for this visit:    Routine general medical examination at a health care facility  -     REVIEW OF HEALTH MAINTENANCE PROTOCOL ORDERS  -     PRIMARY CARE FOLLOW-UP SCHEDULING; Future  -     TSH with free T4 reflex  -     CBC with platelets  -     Comprehensive metabolic panel (BMP + Alb, Alk Phos, ALT, AST, Total. Bili, TP)    Hypertension goal BP (blood pressure) < 140/90, controlled  -     Refill: lisinopril (ZESTRIL) 5 MG tablet; Take 1 tablet (5 mg) by mouth daily    Hyperlipidemia LDL goal <100  -     Refill: atorvastatin (LIPITOR) 20 MG tablet; Take 1 tablet (20 mg) by mouth daily  -     TSH with free T4 reflex  -     Lipid panel reflex to direct LDL Fasting    DDD (degenerative disc disease), lumbar- worsening low back pain  -     MR Lumbar Spine w/o Contrast; Future  -     Plans to consult with a highly recommended Neurosurgeon if surgery is needed. Plans to send details via [employer] if referral is needed.    History of Paroxysmal ventricular tachycardia (H)       -  s/p successful catheter ablation of PVCs - has been doing well since then.     History of Malignant melanoma of left upper extremity including shoulder (H)        -   Follows with dermatology on an annual basis.    Personal history of tobacco use  -     Prof fee: Shared Decision Making for Lung Cancer Screening  -     CT Chest Lung Cancer Scrn Low Dose wo; Future    Need for vaccination  -     TDAP 10-64Y (ADACEL,BOOSTRIX)        Patient has been advised of split billing requirements and indicates understanding: Yes        BMI  Estimated body mass index is 27.22 kg/m  as calculated from the following:    Height as of this encounter: 1.85 m (6' 0.84\").    Weight as of this encounter: 93.2 kg (205 lb 6.4 oz).   Weight management " plan: Discussed healthy diet and exercise guidelines    Counseling  Appropriate preventive services were discussed with this patient, including applicable screening as appropriate for fall prevention, nutrition, physical activity, Tobacco-use cessation, weight loss and cognition.  Checklist reviewing preventive services available has been given to the patient.  Reviewed patient's diet, addressing concerns and/or questions.   Shaggy is at risk for lack of exercise and has been provided with information to increase physical activity for the benefit of Shaggy's well-being.   The patient was instructed to see the dentist every 6 months.       Return in about 1 year (around 6/11/2025) for Physical Exam and as needed throughout the year.      Subjective   Shaggy is a 63 year old, presenting for the following:  Physical and Musculoskeletal Problem (Spinal stenosis/ degenerative discs)        6/11/2024     8:38 AM   Additional Questions   Roomed by Karena CHANG CMA   Accompanied by alone         6/11/2024     8:38 AM   Patient Reported Additional Medications   Patient reports taking the following new medications none      Kody is a pleasant 77-year-old male here for his annual wellness visit and chronic disease management.     HYPERLIPIDEMIA - Patient has a long history of significant Hyperlipidemia requiring medication for treatment with recent good control. Patient reports no problems or side effects with the medication -Atorvastatin.   Last lipid panel-  Recent Labs   Lab Test 03/20/23  0941 02/01/21  0852   CHOL 137 180   HDL 46 51   LDL 83 108*   TRIG 42 103         HYPERTENSION - Patient has longstanding history of HTN , currently denies any symptoms referable to elevated blood pressure. Specifically denies chest pain, palpitations, dyspnea, orthopnea, PND or peripheral edema. Blood pressure readings have been in normal range. Current medication regimen is as listed below. Patient denies any side effects of medication-  Lisinopril 5 mg/day.  Last BPs-  BP Readings from Last 3 Encounters:   06/11/24 124/80   03/20/23 116/80   05/09/22 120/84       Reports a history of chronic low back pain due to degenerative disc disease.  Last lumbar spine MRI was completed 10 years ago in 2014-   This revealed a mild disc bulge with focal herniation seen.  Prominent degenerative changes in the facet joints.  No central canal stenosis.  Moderate left and right neuroforaminal stenosis.  Reports worsening low back pain over the years that is starting to affect his quality of life.  Still ambulating independently and denies having any focal neurologic deficits.   Requesting an updated MRI and wishes to proceed with back surgery-he has a highly recommended Neurosurgeon in mind that he wishes to work with.   Plans to send details via [employer].    History of PVCs s/p successful catheter ablation of PVCs - has been doing well since then.     History of malignant melanoma of the left upper extremity including the shoulder s/p excision.  Follows with dermatology on an annual basis.  Reports that he has an upcoming appointment with dermatology- [facility] with [facility])     HEALTH CARE MAINTENANCE: Anoscopy up-to-date.  Due for labs and vaccine-Tdap        6/10/2024   General Health   How would you rate your overall physical health? (!) FAIR   Feel stress (tense, anxious, or unable to sleep) Not at all         6/10/2024   Nutrition   Three or more servings of calcium each day? (!) NO   Diet: Regular (no restrictions)   How many servings of fruit and vegetables per day? (!) 0-1   How many sweetened beverages each day? 0-1         6/10/2024   Exercise   Days per week of moderate/strenous exercise 3 days   Average minutes spent exercising at this level 150+ min         6/10/2024   Social Factors   Frequency of gathering with friends or relatives Once a week   Worry food won't last until get money to buy more No   Food not last or not have  enough money for food? No   Do you have housing?  Yes   Are you worried about losing your housing? No   Lack of transportation? No   Unable to get utilities (heat,electricity)? No         6/10/2024   Fall Risk   Fallen 2 or more times in the past year? No   Trouble with walking or balance? No          6/10/2024   Dental   Dentist two times every year? (!) NO         6/10/2024   TB Screening   Were you born outside of the [place]? No       Today's PHQ-9 Score:       6/10/2024     9:30 PM   PHQ-9 SCORE   PHQ-9 Total Score MyChart 0   PHQ-9 Total Score 0         6/10/2024   Substance Use   Alcohol more than 3/day or more than 7/wk Not Applicable   Do you use any other substances recreationally? (!) OTHER     Social History     Tobacco Use    Smoking status: Former     Current packs/day: 0.00     Average packs/day: 1 pack/day for 2.0 years (2.0 ttl pk-yrs)     Types: Cigarettes, Other     Start date: 3/31/2019     Quit date: 3/11/2021     Years since quitting: 3.2    Smokeless tobacco: Never    Tobacco comments:     Off and on for many years. 1.5 years since last clean lung scan   Vaping Use    Vaping status: Never Used   Substance Use Topics    Alcohol use: Not Currently     Comment: Quit entirely on March 1, 2019    Drug use: No           6/10/2024   One time HIV Screening   Previous HIV test? Yes         6/10/2024   STI Screening   New sexual partner(s) since last STI/HIV test? No   Last PSA:   PSA   Date Value Ref Range Status   02/01/2021 0.50 0 - 4 ug/L Final     Comment:     Assay Method:  Chemiluminescence using Siemens Vista analyzer     ASCVD Risk   The 10-year ASCVD risk score (Kush MONTEMAYOR, et al., 2019) is: 8.7%    Values used to calculate the score:      Age: 63 years      Sex: Male      Is Non- : No      Diabetic: No      Tobacco smoker: No      Systolic Blood Pressure: 124 mmHg      Is BP treated: Yes      HDL Cholesterol: 46 mg/dL      Total Cholesterol: 137 mg/dL      Reviewed  and updated as needed this visit by Provider                    Past Medical History:   Diagnosis Date    Arthritis     DDD (degenerative disc disease), lumbar     Depressive disorder 2012    Not since 3/1/19    Hyperlipidemia LDL goal <100     Hypertension     Malignant melanoma of left upper extremity including shoulder (H) 07/13/2018    Paroxysmal ventricular tachycardia (H) 3/20/2023     Past Surgical History:   Procedure Laterality Date    BIOPSY  2017    Skin Cancer    COLONOSCOPY  2016    Clear    COLONOSCOPY WITH CO2 INSUFFLATION N/A 02/19/2018    Procedure: COLONOSCOPY WITH CO2 INSUFFLATION;  COLON-SCREENING / ASHER ;  Surgeon: Brandon Ruby MD;  Location: [facility] OR    EP ABLATION PVC N/A 02/03/2022    Procedure: EP ABLATION PVC;  Surgeon: Eli Morel MD;  Location:  HEART CARDIAC CATH LAB    HC KNEE SCOPE,MED/LAT MENISCUS REPAIR  2003    right    PROPHYLAXIS RETINA DETACH,CRYO/DIATH  12/2009     OB History   No obstetric history on file.     BP Readings from Last 3 Encounters:   06/11/24 124/80   03/20/23 116/80   05/09/22 120/84    Wt Readings from Last 3 Encounters:   06/11/24 93.2 kg (205 lb 6.4 oz)   03/20/23 92.1 kg (203 lb)   05/09/22 95.7 kg (211 lb)                  Patient Active Problem List   Diagnosis    Hyperlipidemia LDL goal <100    Seasonal allergies    Hypertension goal BP (blood pressure) < 140/90    GABE (generalized anxiety disorder)    GERD (gastroesophageal reflux disease)    Eczema    Obesity    History of ETOH abuse    Chronic back pain    Impaired glucose tolerance    Anxiety    Malignant melanoma of left upper extremity including shoulder (H)    Alcohol abuse, in remission    PVC's (premature ventricular contractions)    Paroxysmal ventricular tachycardia (H)     Past Surgical History:   Procedure Laterality Date    BIOPSY  2017    Skin Cancer    COLONOSCOPY  2016    Clear    COLONOSCOPY WITH CO2 INSUFFLATION N/A 02/19/2018    Procedure: COLONOSCOPY WITH CO2  "INSUFFLATION;  COLON-SCREENING / ASHER ;  Surgeon: Brandon Ruby MD;  Location: [facility]    EP ABLATION PVC N/A 02/03/2022    Procedure: EP ABLATION PVC;  Surgeon: Eli Morel MD;  Location:  HEART CARDIAC CATH LAB    HC KNEE SCOPE,MED/LAT MENISCUS REPAIR  2003    right    PROPHYLAXIS RETINA DETACH,CRYO/DIATH  12/2009       Social History     Tobacco Use    Smoking status: Former     Current packs/day: 0.00     Average packs/day: 1 pack/day for 2.0 years (2.0 ttl pk-yrs)     Types: Cigarettes, Other     Start date: 3/31/2019     Quit date: 3/11/2021     Years since quitting: 3.2    Smokeless tobacco: Never    Tobacco comments:     Off and on for many years. 1.5 years since last clean lung scan   Substance Use Topics    Alcohol use: Not Currently     Comment: Quit entirely on March 1, 2019     Family History   Problem Relation Age of Onset    Coronary Artery Disease Mother     Hypertension Mother     Hypertension Father     Coronary Artery Disease Brother         MI age 45    Hypertension Brother     Hypertension Maternal Grandmother     Hypertension Maternal Grandfather     Hypertension Paternal Grandmother     Hypertension Paternal Grandfather     Heart Disease Paternal Grandfather     Anesthesia Reaction No family hx of          Current Outpatient Medications   Medication Sig Dispense Refill    atorvastatin (LIPITOR) 20 MG tablet Take 1 tablet (20 mg) by mouth daily 90 tablet 3    lisinopril (ZESTRIL) 5 MG tablet Take 1 tablet (5 mg) by mouth daily 90 tablet 3    mometasone (ELOCON) 0.1 % external cream APPLY TO AFFECTED AREA DAILY AS DIRECTED. 45 g 3    omeprazole (PRILOSEC) 20 MG DR capsule Take 1 Capsule BY MOUTH EVERY DAY  0     No Known Allergies      Review of Systems  Constitutional, HEENT, cardiovascular, pulmonary, gi and gu systems are negative, except as otherwise noted.     Objective    Exam  /80   Pulse 86   Temp 97.5  F (36.4  C) (Temporal)   Resp 16   Ht 1.85 m (6' 0.84\")   " "Wt 93.2 kg (205 lb 6.4 oz)   SpO2 98%   BMI 27.22 kg/m     Estimated body mass index is 27.22 kg/m  as calculated from the following:    Height as of this encounter: 1.85 m (6' 0.84\").    Weight as of this encounter: 93.2 kg (205 lb 6.4 oz).    Physical Exam  GENERAL: alert and no distress  EYES: Eyes grossly normal to inspection, PERRL and conjunctivae and sclerae normal  HENT: ear canals and TM's normal, nose and mouth without ulcers or lesions  NECK: no adenopathy, no asymmetry, masses, or scars  RESP: lungs clear to auscultation - no rales, rhonchi or wheezes  CV: regular rate and rhythm, normal S1 S2, no S3 or S4, no murmur, click or rub, no peripheral edema  ABDOMEN: soft, nontender, no hepatosplenomegaly, no masses and bowel sounds normal  MS: no gross musculoskeletal defects noted, no edema  SKIN: no suspicious lesions or rashes  NEURO: Normal strength and tone, mentation intact and speech normal  PSYCH: mentation appears normal, affect normal/bright    DATA  Reviewed previous MRI  Labs drawn and in process.      Signed Electronically by: Francine Pastor MD    Lung Cancer Screening Shared Decision Making Visit     Kody Salas, a 63 year old choose not to disclose, is eligible for lung cancer screening    History   Smoking Status    Former    Types: Cigarettes, Other   Smokeless Tobacco    Never       I have discussed with patient the risks and benefits of screening for lung cancer with low-dose CT.     The risks include:    radiation exposure: one low dose chest CT has as much ionizing radiation as about 15 chest x-rays, or 6 months of background radiation living in [place]      false positives: most findings/nodules are NOT cancer, but some might still require additional diagnostic evaluation, including biopsy    over-diagnosis: some slow growing cancers that might never have been clinically significant will be detected and treated unnecessarily     The benefit of early detection of lung " cancer is contingent upon adherence to annual screening or more frequent follow up if indicated.     Furthermore, to benefit from screening, Kody must be willing and able to undergo diagnostic procedures, if indicated. Although no specific guide is available for determining severity of comorbidities, it is reasonable to withhold screening in patients who have greater mortality risk from other diseases.     We did discuss that the best way to prevent lung cancer is to not smoke.    Some patients may value a numeric estimation of lung cancer risk when evaluating if lung cancer screening is right for them, here is one calculator:    ShouldIScreen  Answers submitted by the patient for this visit:  Lipid Visit (Submitted on 6/10/2024)  Chief Complaint: Chronic problems general questions HPI Form  Are you regularly taking any medication or supplement to lower your cholesterol?: Yes  Are you having muscle aches or other side effects that you think could be caused by your cholesterol lowering medication?: Yes  Patient Health Questionnaire (Submitted on 6/10/2024)  If you checked off any problems, how difficult have these problems made it for you to do your work, take care of things at home, or get along with other people?: Not difficult at all  PHQ9 TOTAL SCORE: 0  GABE-7 (Submitted on 6/10/2024)  GABE 7 TOTAL SCORE: 0  Back Pain Visit Questionnaire (Submitted on 6/10/2024)  Your back pain is: chronic  Chronic or Recurring Back Pain Visit Questionnaire (Submitted on 6/10/2024)  Where is your back pain located? : right lower back, left lower back, right hip  How would you describe your back pain? : sharp  Where does your back pain spread? : right thigh  Since you noticed your back pain, how has it changed? : always present, but gets better and worse  Does your back pain interfere with your job?: Not applicable

## 2024-06-11 NOTE — PATIENT INSTRUCTIONS
"Preventive Care Advice   This is general advice we often give to help people stay healthy. Your care team may have specific advice just for you. Please talk to your care team about your own preventive care needs.  Lifestyle  Exercise at least 150 minutes each week (30 minutes a day, 5 days a week).  Do muscle strengthening activities 2 days a week. These help control your weight and prevent disease.  No smoking.  Wear sunscreen to prevent skin cancer.  Have your home tested for radon every 2 to 5 years. Radon is a colorless, odorless gas that can harm your lungs. To learn more, go to www.health.[facility].mn. and search for \"Radon in Homes.\"  Keep guns unloaded and locked up in a safe place like a safe or gun vault, or, use a gun lock and hide the keys. Always lock away bullets separately. To learn more, visit [employer].gov and search for \"safe gun storage.\"  Nutrition  Eat 5 or more servings of fruits and vegetables each day.  Try wheat bread, brown rice and whole grain pasta (instead of white bread, rice, and pasta).  Get enough calcium and vitamin D. Check the label on foods and aim for 100% of the RDA (recommended daily allowance).  Regular exams  Have a dental exam and cleaning every 6 months.  See your health care team every year to talk about:  Any changes in your health.  Any medicines your care team has prescribed.  Preventive care, family planning, and ways to prevent chronic diseases.  Shots (vaccines)   HPV shots (up to age 26), if you've never had them before.  Hepatitis B shots (up to age 59), if you've never had them before.  COVID-19 shot: Get this shot when it's due.  Flu shot: Get a flu shot every year.  Tetanus shot: Get a tetanus shot every 10 years.  Pneumococcal, hepatitis A, and RSV shots: Ask your care team if you need these based on your risk.  Shingles shot (for age 50 and up).  General health tests  Diabetes screening:  Starting at age 35, Get screened for diabetes at least every 3 years.  If " you are younger than age 35, ask your care team if you should be screened for diabetes.  Cholesterol test: At age 39, start having a cholesterol test every 5 years, or more often if advised.  Bone density scan (DEXA): At age 50, ask your care team if you should have this scan for osteoporosis (brittle bones).  Hepatitis C: Get tested at least once in your life.  Abdominal aortic aneurysm screening: Talk to your doctor about having this screening if you:  Have ever smoked; and  Are biologically male; and  Are between the ages of 65 and 75.  STIs (sexually transmitted infections)  Before age 24: Ask your care team if you should be screened for STIs.  After age 24: Get screened for STIs if you're at risk. You are at risk for STIs (including HIV) if:  You are sexually active with more than one person.  You don't use condoms every time.  You or a partner was diagnosed with a sexually transmitted infection.  If you are at risk for HIV, ask about PrEP medicine to prevent HIV.  Get tested for HIV at least once in your life, whether you are at risk for HIV or not.  Cancer screening tests  Cervical cancer screening: If you have a cervix, begin getting regular cervical cancer screening tests at age 21. Most people who have regular screenings with normal results can stop after age 65. Talk about this with your provider.  Breast cancer scan (mammogram): If you've ever had breasts, begin having regular mammograms starting at age 40. This is a scan to check for breast cancer.  Colon cancer screening: It is important to start screening for colon cancer at age 45.  Have a colonoscopy test every 10 years (or more often if you're at risk) Or, ask your provider about stool tests like a FIT test every year or Cologuard test every 3 years.  To learn more about your testing options, visit: [email or web address]/162287.pdf.  For help making a decision, visit: leydi/jn04686.  Prostate cancer screening test: If you have a prostate and are age 55  to 69, ask your provider if you would benefit from a yearly prostate cancer screening test.  Lung cancer screening: If you are a current or former smoker age 50 to 80, ask your care team if ongoing lung cancer screenings are right for you.  For informational purposes only. Not to replace the advice of your health care provider. Copyright   2023 [facility]. All rights reserved. Clinically reviewed by the [facility] Transitions Program. [employer] 732663 - REV 04/24.    Substance Use Disorder: Care Instructions  Overview     You can improve your life and health by stopping your use of alcohol or drugs. When you don't drink or use drugs, you may feel and sleep better. You may get along better with your family, friends, and coworkers. There are medicines and programs that can help with substance use disorder.  How can you care for yourself at home?  Here are some ways to help you stay sober and prevent relapse.  If you have been given medicine to help keep you sober or reduce your cravings, be sure to take it exactly as prescribed.  Talk to your doctor about programs that can help you stop using drugs or drinking alcohol.  Do not keep alcohol or drugs in your home.  Plan ahead. Think about what you'll say if other people ask you to drink or use drugs. Try not to spend time with people who drink or use drugs.  Use the time and money spent on drinking or drugs to do something that's important to you.  Preventing a relapse  Have a plan to deal with relapse. Learn to recognize changes in your thinking that lead you to drink or use drugs. Get help before you start to drink or use drugs again.  Try to stay away from situations, friends, or places that may lead you to drink or use drugs.  If you feel the need to drink alcohol or use drugs again, seek help right away. Call a trusted friend or family member. Some people get support from organizations such as Narcotics Anonymous or [employer] or from  treatment facilities.  If you relapse, get help as soon as you can. Some people make a plan with another person that outlines what they want that person to do for them if they relapse. The plan usually includes how to handle the relapse and who to notify in case of relapse.  Don't give up. Remember that a relapse doesn't mean that you have failed. Use the experience to learn the triggers that lead you to drink or use drugs. Then quit again. Recovery is a lifelong process. Many people have several relapses before they are able to quit for good.  Follow-up care is a key part of your treatment and safety. Be sure to make and go to all appointments, and call your doctor if you are having problems. It's also a good idea to know your test results and keep a list of the medicines you take.  When should you call for help?   Call 911  anytime you think you may need emergency care. For example, call if you or someone else:    Has overdosed or has withdrawal signs. Be sure to tell the emergency workers that you are or someone else is using or trying to quit using drugs. Overdose or withdrawal signs may include:  Losing consciousness.  Seizure.  Seeing or hearing things that aren't there (hallucinations).     Is thinking or talking about suicide or harming others.   Where to get help 24 hours a day, 7 days a week   If you or someone you know talks about suicide, self-harm, a mental health crisis, a substance use crisis, or any other kind of emotional distress, get help right away. You can:    Call the Suicide and Crisis Lifeline at 988.     Call 2-314-992-TALK (1-144.831.3513).     Text HOME to 137014 to access the Crisis Text Line.   Consider saving these numbers in your phone.  Go to [employer]ply [employer]) for more information or to chat online.  Call your doctor now or seek immediate medical care if:    You are having withdrawal symptoms. These may include nausea or vomiting, sweating, shakiness, and anxiety.   Watch closely for  "changes in your health, and be sure to contact your doctor if:    You have a relapse.     You need more help or support to stop.   Where can you learn more?  Go to https://www.[employer].net/patiented  Enter H573 in the search box to learn more about \"Substance Use Disorder: Care Instructions.\"  Current as of: November 15, 2023               Content Version: 14.0    0802-1798 [employer]   Care instructions adapted under license by your healthcare professional. If you have questions about a medical condition or this instruction, always ask your healthcare professional. [employer] disclaims any warranty or liability for your use of this information.      Lung Cancer Screening   Frequently Asked Questions  If you are at high-risk for lung cancer, getting screened with low-dose computed tomography (LDCT) every year can help save your life. This handout offers answers to some of the most common questions about lung cancer screening. If you have other questions, please call [phone] ([phone]).     What is it?  Lung cancer screening uses special X-ray technology to create an image of your lung tissue. The exam is quick and easy and takes less than 10 seconds. We don t give you any medicine or use any needles. You can eat before and after the exam. You don t need to change your clothes as long as the clothing on your chest doesn t contain metal. But, you do need to be able to hold your breath for at least 6 seconds during the exam.    What is the goal of lung cancer screening?  The goal of lung cancer screening is to save lives. Many times, lung cancer is not found until a person starts having physical symptoms. Lung cancer screening can help detect lung cancer in the earliest stages when it may be easier to treat.    Who should be screened for lung cancer?  We suggest lung cancer screening for anyone who is at high-risk for lung cancer. You are in the high-risk group if " you:      are between the ages of 55 and 79, and    have smoked at least 1 pack of cigarettes a day for 20 or more years, and    still smoke or have quit within the past 15 years.    However, if you have a new cough or shortness of breath, you should talk to your doctor before being screened.    Why does it matter if I have symptoms?  Certain symptoms can be a sign that you have a condition in your lungs that should be checked and treated by your doctor. These symptoms include fever, chest pain, a new or changing cough, shortness of breath that you have never felt before, coughing up blood or unexplained weight loss. Having any of these symptoms can greatly affect the results of lung cancer screening.       Should all smokers get an LDCT lung cancer screening exam?  It depends. Lung cancer screening is for a very specific group of men and women who have a history of heavy smoking over a long period of time (see  Who should be screened for lung cancer  above).  I am in the high-risk group, but have been diagnosed with cancer in the past. Is LDCT lung cancer screening right for me?  In some cases, you should not have LDCT lung screening, such as when your doctor is already following your cancer with CT scan studies. Your doctor will help you decide if LDCT lung screening is right for you.  Do I need to have a screening exam every year?  Yes. If you are in the high-risk group described earlier, you should get an LDCT lung cancer screening exam every year until you are 79, or are no longer willing or able to undergo screening and possible procedures to diagnose and treat lung cancer.  How effective is LDCT at preventing death from lung cancer?  Studies have shown that LDCT lung cancer screening can lower the risk of death from lung cancer by 20 percent in people who are at high-risk.  What are the risks?  There are some risks and limitations of LDCT lung cancer screening. We want to make sure you understand the risks  and benefits, so please let us know if you have any questions. Your doctor may want to talk with you more about these risks.    Radiation exposure: As with any exam that uses radiation, there is a very small increased risk of cancer. The amount of radiation in LDCT is small--about the same amount a person would get from a mammogram. Your doctor orders the exam when he or she feels the potential benefits outweigh the risks.    False negatives: No test is perfect, including LDCT. It is possible that you may have a medical condition, including lung cancer, that is not found during your exam. This is called a false negative result.    False positives and more testing: LDCT very often finds something in the lung that could be cancer, but in fact is not. This is called a false positive result. False positive tests often cause anxiety. To make sure these findings are not cancer, you may need to have more tests. These tests will be done only if you give us permission. Sometimes patients need a treatment that can have side effects, such as a biopsy. For more information on false positives, see  What can I expect from the results?     Findings not related to lung cancer: Your LDCT exam also takes pictures of areas of your body next to your lungs. In a very small number of cases, the CT scan will show an abnormal finding in one of these areas, such as your kidneys, adrenal glands, liver or thyroid. This finding may not be serious, but you may need more tests. Your doctor can help you decide what other tests you may need, if any.  What can I expect from the results?  About 1 out of 4 LDCT exams will find something that may need more tests. Most of the time, these findings are lung nodules. Lung nodules are very small collections of tissue in the lung. These nodules are very common, and the vast majority--more than 97 percent--are not cancer (benign). Most are normal lymph nodes or small areas of scarring from past  infections.  But, if a small lung nodule is found to be cancer, the cancer can be cured more than 90 percent of the time. To know if the nodule is cancer, we may need to get more images before your next yearly screening exam. If the nodule has suspicious features (for example, it is large, has an odd shape or grows over time), we will refer you to a specialist for further testing.  Will my doctor also get the results?  Yes. Your doctor will get a copy of your results.  Is it okay to keep smoking now that there s a cancer screening exam?  No. Tobacco is one of the strongest cancer-causing agents. It causes not only lung cancer, but other cancers and cardiovascular (heart) diseases as well. The damage caused by smoking builds over time. This means that the longer you smoke, the higher your risk of disease. While it is never too late to quit, the sooner you quit, the better.  Where can I find help to quit smoking?  The best way to prevent lung cancer is to stop smoking. If you have already quit smoking, congratulations and keep it up! For help on quitting smoking, please call [employer] at 3-889-QUITNOW (1-487.447.2675) or the American [employer] Society at 1-291.777.5614 to find local resources near you.  One-on-one health coaching:  If you d prefer to work individually with a health care provider on tobacco cessation, we offer:      Medication Therapy Management:  Our specially trained pharmacists work closely with you and your doctor to help you quit smoking.  Call [phone] or [phone] (toll free).

## 2024-06-11 NOTE — PROGRESS NOTES
Prior to immunization administration, verified patients identity using patient s name and date of birth. Please see Immunization Activity for additional information.     Screening Questionnaire for Adult Immunization    Are you sick today?   No   Do you have allergies to medications, food, a vaccine component or latex?   No   Have you ever had a serious reaction after receiving a vaccination?   No   Do you have a long-term health problem with heart, lung, kidney, or metabolic disease (e.g., diabetes), asthma, a blood disorder, no spleen, complement component deficiency, a cochlear implant, or a spinal fluid leak?  Are you on long-term aspirin therapy?   No   Do you have cancer, leukemia, HIV/AIDS, or any other immune system problem?   No   Do you have a parent, brother, or sister with an immune system problem?   No   In the past 3 months, have you taken medications that affect  your immune system, such as prednisone, other steroids, or anticancer drugs; drugs for the treatment of rheumatoid arthritis, Crohn s disease, or psoriasis; or have you had radiation treatments?   No   Have you had a seizure, or a brain or other nervous system problem?   No   During the past year, have you received a transfusion of blood or blood    products, or been given immune (gamma) globulin or antiviral drug?   No   For women: Are you pregnant or is there a chance you could become       pregnant during the next month?   No   Have you received any vaccinations in the past 4 weeks?   No     Immunization questionnaire answers were all negative.      Patient instructed to remain in clinic for 15 minutes afterwards, and to report any adverse reactions.     Screening performed by Karena Duque MA on 6/11/2024 at 9:22 AM.

## 2024-07-17 ENCOUNTER — ANCILLARY PROCEDURE (OUTPATIENT)
Dept: CT IMAGING | Facility: CLINIC | Age: 64
End: 2024-07-17
Attending: FAMILY MEDICINE
Payer: COMMERCIAL

## 2024-07-17 ENCOUNTER — ANCILLARY PROCEDURE (OUTPATIENT)
Dept: MRI IMAGING | Facility: CLINIC | Age: 64
End: 2024-07-17
Attending: FAMILY MEDICINE
Payer: COMMERCIAL

## 2024-07-17 DIAGNOSIS — Z87.891 PERSONAL HISTORY OF TOBACCO USE: ICD-10-CM

## 2024-07-17 DIAGNOSIS — M51.369 DDD (DEGENERATIVE DISC DISEASE), LUMBAR: ICD-10-CM

## 2024-07-17 PROCEDURE — 71271 CT THORAX LUNG CANCER SCR C-: CPT | Mod: TC | Performed by: RADIOLOGY

## 2024-07-17 PROCEDURE — 72148 MRI LUMBAR SPINE W/O DYE: CPT | Mod: TC | Performed by: RADIOLOGY

## 2024-09-19 ENCOUNTER — TRANSFERRED RECORDS (OUTPATIENT)
Dept: HEALTH INFORMATION MANAGEMENT | Facility: CLINIC | Age: 64
End: 2024-09-19
Payer: COMMERCIAL

## 2024-11-04 ENCOUNTER — TRANSFERRED RECORDS (OUTPATIENT)
Dept: HEALTH INFORMATION MANAGEMENT | Facility: CLINIC | Age: 64
End: 2024-11-04
Payer: COMMERCIAL

## 2024-12-16 ENCOUNTER — OFFICE VISIT (OUTPATIENT)
Dept: FAMILY MEDICINE | Facility: CLINIC | Age: 64
End: 2024-12-16
Payer: COMMERCIAL

## 2024-12-16 VITALS
OXYGEN SATURATION: 98 % | BODY MASS INDEX: 26.68 KG/M2 | DIASTOLIC BLOOD PRESSURE: 89 MMHG | HEART RATE: 73 BPM | WEIGHT: 197 LBS | SYSTOLIC BLOOD PRESSURE: 137 MMHG | RESPIRATION RATE: 18 BRPM | HEIGHT: 72 IN | TEMPERATURE: 98.4 F

## 2024-12-16 DIAGNOSIS — E78.5 HYPERLIPIDEMIA LDL GOAL <100: ICD-10-CM

## 2024-12-16 DIAGNOSIS — I10 HYPERTENSION GOAL BP (BLOOD PRESSURE) < 140/90: ICD-10-CM

## 2024-12-16 DIAGNOSIS — Z01.818 PREOP GENERAL PHYSICAL EXAM: Primary | ICD-10-CM

## 2024-12-16 PROCEDURE — 99214 OFFICE O/P EST MOD 30 MIN: CPT | Performed by: NURSE PRACTITIONER

## 2024-12-16 RX ORDER — ATORVASTATIN CALCIUM 20 MG/1
20 TABLET, FILM COATED ORAL DAILY
COMMUNITY
Start: 2024-12-16

## 2024-12-16 ASSESSMENT — PAIN SCALES - GENERAL: PAINLEVEL_OUTOF10: NO PAIN (0)

## 2024-12-16 NOTE — PATIENT INSTRUCTIONS
- Lisinopril: DO NOT TAKE on day of surgery (minimum 11 hours for general anesthesia).   - Statins: Continue taking on the day of surgery.    - Herbal medications and vitamins: DO NOT TAKE 14 days prior to surgery.

## 2024-12-16 NOTE — PROGRESS NOTES
Pre[facility] Evaluation  [facility]  [address] [facility]  [place] [address]  Phone: [phone]  Primary Provider: Kristen Uribe MD  Pre-op Performing Provider: NANDINI Jean CNP  Dec 16, 2024             12/16/2024   Surgical Information   What procedure is being done? back surgery    Facility or Hospital where procedure/surgery will be performed: [facility]    Who is doing the procedure / surgery? dr dodson    Date of surgery / procedure: 12 30 24    Time of surgery / procedure: 700    Where do you plan to recover after surgery? at home with family        Patient-reported     Fax number for surgical facility: [facility]    Assessment & Plan     The proposed surgical procedure is considered INTERMEDIATE risk.    (Z01.818) Preop general physical exam  (primary encounter diagnosis)  Comment: Presently Clinically Stable for Scheduled Surgery.    PLAN:   -Avoidance of; Aspirin, Ibuprofen, Naproxen, and other NSAIDS 5-7 days prior to surgery.   -To call with any changes in present status     Risks and Recommendations  The patient has the following additional risks and recommendations for perioperative complications:   - No identified additional risk factors other than previously addressed    Antiplatelet or Anticoagulation Medication Instructions   - Patient is on no antiplatelet or anticoagulation medications.    Additional Medication Instructions   - ACE/ARB: DO NOT TAKE on day of surgery (minimum 11 hours for general anesthesia).   - Statins: Continue taking on the day of surgery.    - Herbal medications and vitamins: DO NOT TAKE 14 days prior to surgery.    Recommendation  Approval given to proceed with proposed procedure, without further diagnostic evaluation.    (I10) Hypertension goal BP (blood pressure) < 140/90  Comment: Well-controlled with the current medication.  Plan:  -The current medical regimen is effective;  continue present plan and medications.      (E78.5) Hyperlipidemia LDL goal <100  Comment: Patient has a history of hyperlipidemia.  He reported he is not on a statin but lose weight and trying lifestyle modification.  Plan:   -Encouraged him to restart atorvastatin (LIPITOR) 20 MG tablet      Fabi Coon is a 64 year old, presenting for the following:  Pre Op Exam          12/16/2024    10:16 AM   Additional Questions   Roomed by Pratima         12/16/2024   Forms   Any forms needing to be completed Yes        HPI related to upcoming procedure:     Kody Salas is a 64-year-old adult scheduled to undergo lower back surgery. The patient reports no complications from previous anesthesia experiences. They describe their functional status as good, stating that they are able to walk four blocks and climb two flights of stairs without difficulty. The patient denies experiencing any chest pain, shortness of breath, or palpitations. Additionally, the patient had a complete metabolic panel and complete blood count performed six months ago, which returned unremarkable results.              12/16/2024   Pre-Op Questionnaire   Have you ever had a heart attack or stroke? No    Have you ever had surgery on your heart or blood vessels, such as a stent placement, a coronary artery bypass, or surgery on an artery in your head, neck, heart, or legs? No    Do you have chest pain with activity? No    Do you have a history of heart failure? No    Do you currently have a cold, bronchitis or symptoms of other infection? No    Do you have a cough, shortness of breath, or wheezing? No    Do you or anyone in your family have previous history of blood clots? No    Do you or does anyone in your family have a serious bleeding problem such as prolonged bleeding following surgeries or cuts? No    Have you ever had problems with anemia or been told to take iron pills? No    Have you had any abnormal blood loss such as black, tarry or bloody stools? No    Have you had any  abnormal blood loss such as black, tarry or bloody stools, or abnormal vaginal bleeding? No    Have you ever had a blood transfusion? No    Are you willing to have a blood transfusion if it is medically needed before, during, or after your surgery? Yes    Have you or any of your relatives ever had problems with anesthesia? No    Do you have sleep apnea, excessive snoring or daytime drowsiness? No    Do you have any artifical heart valves or other implanted medical devices like a pacemaker, defibrillator, or continuous glucose monitor? No    Do you have artificial joints? No    Are you allergic to latex? No        Patient-reported     Health Care Directive  Patient does not have a Health Care Directive: Discussed advance care planning with patient; however, patient declined at this time.    Preoperative Review of    reviewed - no record of controlled substances prescribed.      Status of Chronic Conditions:  HYPERTENSION - Patient has longstanding history of HTN , currently denies any symptoms referable to elevated blood pressure. Specifically denies chest pain, palpitations, dyspnea, orthopnea, PND or peripheral edema. Blood pressure readings have been in normal range. Current medication regimen is as listed below. Patient denies any side effects of medication.     Patient Active Problem List    Diagnosis Date Noted    Paroxysmal ventricular tachycardia (H) 03/20/2023     Priority: Medium    PVC's (premature ventricular contractions) 02/03/2022     Priority: Medium    Alcohol abuse, in remission 01/13/2020     Priority: Medium    Malignant melanoma of left upper extremity including shoulder (H) 07/13/2018     Priority: Medium     Diagnosed Feburary 2018-sees Dr Ebstein at [facility]      Anxiety 08/02/2016     Priority: Medium    Impaired glucose tolerance 01/06/2016     Priority: Medium    Chronic back pain 01/08/2015     Priority: Medium    History of ETOH abuse 01/07/2014     Priority: Medium     Sober    Since December 5th 2017      Obesity 11/27/2013     Priority: Medium    GERD (gastroesophageal reflux disease) 11/13/2012     Priority: Medium    Eczema 11/13/2012     Priority: Medium    Hypertension goal BP (blood pressure) < 140/90 07/17/2012     Priority: Medium    GABE (generalized anxiety disorder) 07/17/2012     Priority: Medium    Hyperlipidemia LDL goal <100 04/23/2010     Priority: Medium    Seasonal allergies 04/23/2010     Priority: Medium      Past Medical History:   Diagnosis Date    Arthritis     DDD (degenerative disc disease), lumbar     Depressive disorder 2012    Not since 3/1/19    Hyperlipidemia LDL goal <100     Hypertension     Malignant melanoma of left upper extremity including shoulder (H) 07/13/2018    Paroxysmal ventricular tachycardia (H) 3/20/2023     Past Surgical History:   Procedure Laterality Date    BIOPSY  2017    Skin Cancer    COLONOSCOPY  2016    Clear    COLONOSCOPY WITH CO2 INSUFFLATION N/A 02/19/2018    Procedure: COLONOSCOPY WITH CO2 INSUFFLATION;  COLON-SCREENING / ASHER ;  Surgeon: Brandon Ruby MD;  Location: [facility] OR    EP ABLATION PVC N/A 02/03/2022    Procedure: EP ABLATION PVC;  Surgeon: Eli Morel MD;  Location:  [facility]    HC KNEE SCOPE,MED/LAT MENISCUS REPAIR  2003    right    PROPHYLAXIS RETINA DETACH,CRYO/DIATH  12/2009     Current Outpatient Medications   Medication Sig Dispense Refill    lisinopril (ZESTRIL) 5 MG tablet Take 1 tablet (5 mg) by mouth daily 90 tablet 3    mometasone (ELOCON) 0.1 % external cream APPLY TO AFFECTED AREA DAILY AS DIRECTED. 45 g 3    omeprazole (PRILOSEC) 20 MG DR capsule Take 1 Capsule BY MOUTH EVERY DAY  0    atorvastatin (LIPITOR) 20 MG tablet Take 1 tablet (20 mg) by mouth daily (Patient not taking: Reported on 12/16/2024) 90 tablet 3       No Known Allergies     Social History     Tobacco Use    Smoking status: Former     Current packs/day: 0.00     Average packs/day: 1 pack/day for 2.0 years (2.0  ttl pk-yrs)     Types: Cigarettes, Other     Start date: 3/31/2019     Quit date: 3/11/2021     Years since quitting: 3.7     Passive exposure: Past    Smokeless tobacco: Never    Tobacco comments:     Off and on for many years. 1.5 years since last clean lung scan   Substance Use Topics    Alcohol use: Not Currently     Comment: Quit entirely on March 1, 2019       History   Drug Use No             Review of Systems  CONSTITUTIONAL: NEGATIVE for fever, chills, change in weight  INTEGUMENTARY/SKIN: NEGATIVE for worrisome rashes, moles or lesions  EYES: NEGATIVE for vision changes or irritation  ENT/MOUTH: NEGATIVE for ear, mouth and throat problems  RESP: NEGATIVE for significant cough or SOB  BREAST: NEGATIVE for masses, tenderness or discharge  CV: NEGATIVE for chest pain, palpitations or peripheral edema  GI: NEGATIVE for nausea, abdominal pain, heartburn, or change in bowel habits  : NEGATIVE for frequency, dysuria, or hematuria  MUSCULOSKELETAL:back pain  NEURO: NEGATIVE for weakness, dizziness or paresthesias  ENDOCRINE: NEGATIVE for temperature intolerance, skin/hair changes  HEME: NEGATIVE for bleeding problems  PSYCHIATRIC: NEGATIVE for changes in mood or affect    Objective    /89 (BP Location: Left arm, Patient Position: Sitting, Cuff Size: Adult Large)   Pulse 73   Temp 98.4  F (36.9  C) (Temporal)   Resp 18   Ht 1.829 m (6')   Wt 89.4 kg (197 lb)   SpO2 98%   BMI 26.72 kg/m     Estimated body mass index is 26.72 kg/m  as calculated from the following:    Height as of this encounter: 1.829 m (6').    Weight as of this encounter: 89.4 kg (197 lb).  Physical Exam  GENERAL: alert and no distress  EYES: Eyes grossly normal to inspection, PERRL and conjunctivae and sclerae normal  HENT: ear canals and TM's normal, nose and mouth without ulcers or lesions  NECK: no adenopathy, no asymmetry, masses, or scars  RESP: lungs clear to auscultation - no rales, rhonchi or wheezes  CV: regular rate and  rhythm, normal S1 S2, no S3 or S4, no murmur, click or rub, no peripheral edema  ABDOMEN: soft, nontender, no hepatosplenomegaly, no masses and bowel sounds normal  MS: back: limited range of motion and tenderness  SKIN: no suspicious lesions or rashes  NEURO: Normal strength and tone, mentation intact and speech normal  PSYCH: mentation appears normal, affect normal/bright    Recent Labs   Lab Test 06/11/24  0934   HGB 14.6         POTASSIUM 4.8   CR 0.82        Diagnostics  No labs were ordered during this visit.   No EKG required, no history of coronary heart disease, peripheral arterial disease or other structural heart disease.    Revised Cardiac Risk Index (RCRI)  The patient has the following serious cardiovascular risks for perioperative complications:   - No serious cardiac risks = 0 points     RCRI Interpretation: 1 point: Class II (low risk - 0.9% complication rate)         Signed Electronically by: NANDINI Jean CNP  A copy of this evaluation report is provided to the requesting physician.

## 2025-02-23 SDOH — HEALTH STABILITY: PHYSICAL HEALTH: ON AVERAGE, HOW MANY DAYS PER WEEK DO YOU ENGAGE IN MODERATE TO STRENUOUS EXERCISE (LIKE A BRISK WALK)?: 5 DAYS

## 2025-02-23 SDOH — HEALTH STABILITY: PHYSICAL HEALTH: ON AVERAGE, HOW MANY MINUTES DO YOU ENGAGE IN EXERCISE AT THIS LEVEL?: 20 MIN

## 2025-02-23 ASSESSMENT — SOCIAL DETERMINANTS OF HEALTH (SDOH): HOW OFTEN DO YOU GET TOGETHER WITH FRIENDS OR RELATIVES?: ONCE A WEEK

## 2025-02-24 ENCOUNTER — OFFICE VISIT (OUTPATIENT)
Dept: FAMILY MEDICINE | Facility: CLINIC | Age: 65
End: 2025-02-24
Payer: COMMERCIAL

## 2025-02-24 VITALS
OXYGEN SATURATION: 97 % | WEIGHT: 194.1 LBS | HEART RATE: 81 BPM | DIASTOLIC BLOOD PRESSURE: 80 MMHG | BODY MASS INDEX: 24.91 KG/M2 | HEIGHT: 74 IN | TEMPERATURE: 98.1 F | RESPIRATION RATE: 18 BRPM | SYSTOLIC BLOOD PRESSURE: 124 MMHG

## 2025-02-24 DIAGNOSIS — E78.5 HYPERLIPIDEMIA LDL GOAL <100: ICD-10-CM

## 2025-02-24 DIAGNOSIS — Z76.89 ENCOUNTER TO ESTABLISH CARE: Primary | ICD-10-CM

## 2025-02-24 PROBLEM — C43.62 MALIGNANT MELANOMA OF LEFT UPPER EXTREMITY INCLUDING SHOULDER (H): Status: RESOLVED | Noted: 2018-07-13 | Resolved: 2025-02-24

## 2025-02-24 PROBLEM — I47.20 PAROXYSMAL VENTRICULAR TACHYCARDIA (H): Status: RESOLVED | Noted: 2023-03-20 | Resolved: 2025-02-24

## 2025-02-24 PROBLEM — I47.29 PAROXYSMAL VENTRICULAR TACHYCARDIA (H): Status: RESOLVED | Noted: 2023-03-20 | Resolved: 2025-02-24

## 2025-02-24 LAB
CHOLEST SERPL-MCNC: 233 MG/DL
FASTING STATUS PATIENT QL REPORTED: NO
HDLC SERPL-MCNC: 56 MG/DL
LDLC SERPL CALC-MCNC: 166 MG/DL
NONHDLC SERPL-MCNC: 177 MG/DL
TRIGL SERPL-MCNC: 56 MG/DL

## 2025-02-24 PROCEDURE — 99213 OFFICE O/P EST LOW 20 MIN: CPT

## 2025-02-24 PROCEDURE — 80061 LIPID PANEL: CPT

## 2025-02-24 PROCEDURE — 36415 COLL VENOUS BLD VENIPUNCTURE: CPT

## 2025-02-24 ASSESSMENT — PAIN SCALES - GENERAL: PAINLEVEL_OUTOF10: NO PAIN (0)

## 2025-02-24 NOTE — PROGRESS NOTES
"Preventive Care Visit  [facility]  NANDINI Montes CNP, Nurse Practitioner Primary Care  Feb 24, 2025      Assessment & Plan     Encounter to establish care  Patient is in overall general good health. Patient had no concerns during today's visit. Patient is in the care of Ophthalmology for multiple surgeries eye complications. No concerns during today's visit.     - REVIEW OF HEALTH MAINTENANCE PROTOCOL ORDERS    Hyperlipidemia LDL goal <100  Patient reported he stopped taking Lipitor for multiple reasons he read about. Rechecking Lipids as patient reports he has lost 60 lbs. In the last 5 years.  Patient is considering lifestyle to manage lipids. Checking Lipids during today's visit.     - Lipid panel reflex to direct LDL Fasting; Future    Patient has been advised of split billing requirements and indicates understanding: Yes    BMI  Estimated body mass index is 25.18 kg/m  as calculated from the following:    Height as of this encounter: 1.87 m (6' 1.62\").    Weight as of this encounter: 88 kg (194 lb 1.6 oz).     Counseling  Appropriate preventive services were addressed with this patient via screening, questionnaire, or discussion as appropriate for fall prevention, nutrition, physical activity, Tobacco-use cessation, social engagement, weight loss and cognition.  Checklist reviewing preventive services available has been given to the patient.  Reviewed patient's diet, addressing concerns and/or questions.   The patient was instructed to see the dentist every 6 months.       Fabi Coon is a 64 year old, presenting for the following:  Physical (Fasting for labs, would like lipids checked as he has stopped using statin), Health Maintenance (Declines all vaccinations today), and Establish Care        2/24/2025     8:42 AM   Additional Questions   Roomed by Sherry GUTIERREZ LPN   Accompanied by self         2/24/2025     8:42 AM   Patient Reported Additional Medications "   Patient reports taking the following new medications no new meds        HPI  Patient is in overall general good health. Patient had no concerns during today's visit. Patient is in the care of Ophthalmology for multiple surgeries eye complications. No concerns during today's visit. Patient reported he stopped taking Lipitor for multiple reasons he read about. Rechecking Lipids as patient reports he has lost 60 lbs. In the last 5 years.  Patient is considering lifestyle to manage lipids. Checking Lipids during todays visit.     Annual Wellness Visit     Patient has been advised of split billing requirements and indicates understanding: Yes      Health Care Directive  Patient does not have a Health Care Directive: Discussed advance care planning with patient; however, patient declined at this time.      2/23/2025   General Health   How would you rate your overall physical health? Good   Feel stress (tense, anxious, or unable to sleep) Not at all          2/23/2025   Nutrition   Diet: Regular (no restrictions)         2/23/2025   Exercise   Days per week of moderate/strenous exercise 5 days   Average minutes spent exercising at this level 20 min           2/23/2025   Social Factors   Frequency of gathering with friends or relatives Once a week   Worry food won't last until get money to buy more No   Food not last or not have enough money for food? No   Do you have housing? (Housing is defined as stable permanent housing and does not include staying ouside in a car, in a tent, in an abandoned building, in an overnight shelter, or couch-surfing.) Yes   Are you worried about losing your housing? No   Lack of transportation? No   Unable to get utilities (heat,electricity)? No           2/23/2025   Fall Risk   Fallen 2 or more times in the past year? No   Trouble with walking or balance? No           No data to display                  2/23/2025   Dental   Dentist two times every year? (!) NO          No data to display                      No data to display                  6/10/2024   TB Screening   Were you born outside of the [place]? No       Social History     Tobacco Use    Smoking status: Former     Current packs/day: 0.00     Average packs/day: 1 pack/day for 2.0 years (2.0 ttl pk-yrs)     Types: Cigarettes, Other     Start date: 3/31/2019     Quit date: 3/11/2021     Years since quitting: 3.9     Passive exposure: Past    Smokeless tobacco: Never    Tobacco comments:     Off and on for many years. 1.5 years since last clean lung scan   Vaping Use    Vaping status: Never Used   Substance Use Topics    Alcohol use: Not Currently     Comment: Quit entirely on 2019    Drug use: No         Today's PHQ-2 Score:       2025     7:56 PM   PHQ-2 (  [employer])   Q1: Little interest or pleasure in doing things 0   Q2: Feeling down, depressed or hopeless 0   PHQ-2 Score 0    Q1: Little interest or pleasure in doing things Not at all   Q2: Feeling down, depressed or hopeless Not at all   PHQ-2 Score 0       Patient-reported             2025   One time HIV Screening   Previous HIV test? Yes         2025   STI Screening   New sexual partner(s) since last STI/HIV test? No   Last PSA:   PSA   Date Value Ref Range Status   2021 0.50 0 - 4 ug/L Final     Comment:     Assay Method:  Chemiluminescence using Siemens Vista analyzer     ASCVD Risk   The 10-year ASCVD risk score (Kush MONTEMAYOR, et al., 2019) is: 9.6%    Values used to calculate the score:      Age: 64 years      Sex: Male      Is Non- : No      Diabetic: No      Tobacco smoker: No      Systolic Blood Pressure: 124 mmHg      Is BP treated: Yes      HDL Cholesterol: 47 mg/dL      Total Cholesterol: 141 mg/dL    Fracture Risk Assessment Tool  Link to Frax Calculator  Use the information below to complete the Frax calculator  : 1960  Sex: adult  Weight (kg): 88 kg (actual weight)  Height (cm): 187 cm  Previous Fragility  Fracture:  No  History of parent with fractured hip:  No  Current Smoking:  No  Patient has been on glucocorticoids for more than 3 months (5mg/day or more): No  Rheumatoid Arthritis on Problem List:  No  Secondary Osteoporosis on Problem List:  No  Consumes 3 or more units of alcohol per day: No  Femoral Neck BMD (g/cm2)             Reviewed and updated as needed this visit by Provider     Meds                Lab work is in process    Current providers sharing in care for this patient include:  Patient Care Team:  Shahana Gamboa APRN CNP as PCP - General (Nurse Practitioner Primary Care)  Opal Cornejo RN as Specialty Care Coordinator (Cardiology)  Eli Morel MD (Cardiovascular Disease)  Francine Pastor MD as Assigned PCP    The following health maintenance items are reviewed in Epic and correct as of today:  Health Maintenance   Topic Date Due    HIV SCREENING  Never done    Pneumococcal Vaccine: 50+ Years (1 of 2 - PCV) Never done    ZOSTER IMMUNIZATION (1 of 2) Never done    RSV VACCINE (1 - Risk 60-74 years 1-dose series) Never done    INFLUENZA VACCINE (1) 09/01/2024    COVID-19 Vaccine (1 - 2024-25 season) Never done    BMP  06/11/2025    LIPID  06/11/2025    LUNG CANCER SCREENING  07/17/2025    YEARLY PREVENTIVE VISIT  02/24/2026    ANNUAL REVIEW OF HM ORDERS  02/24/2026    GLUCOSE  06/11/2027    COLORECTAL CANCER SCREENING  02/19/2028    ADVANCE CARE PLANNING  02/24/2030    DTAP/TDAP/TD IMMUNIZATION (3 - Td or Tdap) 06/11/2034    HEPATITIS C SCREENING  Completed    PHQ-2 (once per calendar year)  Completed    HPV IMMUNIZATION  Aged Out    MENINGITIS IMMUNIZATION  Aged Out       Appropriate preventive services were discussed with this patient, including applicable screening as appropriate for fall prevention, nutrition, physical activity, Tobacco-use cessation, weight loss and cognition.  Checklist reviewing preventive services available has been given to the patient.        Health Care  Directive  Patient does not have a Health Care Directive: Discussed advance care planning with patient; however, patient declined at this time.      2/23/2025   General Health   How would you rate your overall physical health? Good   Feel stress (tense, anxious, or unable to sleep) Not at all         2/23/2025   Nutrition   Three or more servings of calcium each day? Yes   Diet: Regular (no restrictions)   How many servings of fruit and vegetables per day? (!) 2-3   How many sweetened beverages each day? 0-1         2/23/2025   Exercise   Days per week of moderate/strenous exercise 5 days   Average minutes spent exercising at this level 20 min         2/23/2025   Social Factors   Frequency of gathering with friends or relatives Once a week   Worry food won't last until get money to buy more No   Food not last or not have enough money for food? No   Do you have housing? (Housing is defined as stable permanent housing and does not include staying ouside in a car, in a tent, in an abandoned building, in an overnight shelter, or couch-surfing.) Yes   Are you worried about losing your housing? No   Lack of transportation? No   Unable to get utilities (heat,electricity)? No         2/23/2025   Fall Risk   Fallen 2 or more times in the past year? No   Trouble with walking or balance? No          2/23/2025   Dental   Dentist two times every year? (!) NO         6/10/2024   TB Screening   Were you born outside of the [place]? No     Today's PHQ-2 Score:       2/23/2025     7:56 PM   PHQ-2 ( 1999 [employer])   Q1: Little interest or pleasure in doing things 0   Q2: Feeling down, depressed or hopeless 0   PHQ-2 Score 0    Q1: Little interest or pleasure in doing things Not at all   Q2: Feeling down, depressed or hopeless Not at all   PHQ-2 Score 0       Patient-reported           2/23/2025   Substance Use   Alcohol more than 3/day or more than 7/wk Not Applicable   Do you use any other substances recreationally? No     Social History  "    Tobacco Use    Smoking status: Former     Current packs/day: 0.00     Average packs/day: 1 pack/day for 2.0 years (2.0 ttl pk-yrs)     Types: Cigarettes, Other     Start date: 3/31/2019     Quit date: 3/11/2021     Years since quitting: 3.9     Passive exposure: Past    Smokeless tobacco: Never    Tobacco comments:     Off and on for many years. 1.5 years since last clean lung scan   Vaping Use    Vaping status: Never Used   Substance Use Topics    Alcohol use: Not Currently     Comment: Quit entirely on March 1, 2019    Drug use: No             2/23/2025   One time HIV Screening   Previous HIV test? Yes         2/23/2025   STI Screening   New sexual partner(s) since last STI/HIV test? No   Last PSA:   PSA   Date Value Ref Range Status   02/01/2021 0.50 0 - 4 ug/L Final     Comment:     Assay Method:  Chemiluminescence using Siemens Vista analyzer     ASCVD Risk   The 10-year ASCVD risk score (Kush MONTEMAYOR, et al., 2019) is: 9.6%    Values used to calculate the score:      Age: 64 years      Sex: Male      Is Non- : No      Diabetic: No      Tobacco smoker: No      Systolic Blood Pressure: 124 mmHg      Is BP treated: Yes      HDL Cholesterol: 47 mg/dL      Total Cholesterol: 141 mg/dL       Reviewed and updated as needed this visit by Provider                  Review of Systems  Constitutional, HEENT, cardiovascular, pulmonary, GI, , musculoskeletal, neuro, skin, endocrine and psych systems are negative, except as otherwise noted.     Objective    Exam  /80 (BP Location: Right arm, Patient Position: Sitting, Cuff Size: Adult Regular)   Pulse 81   Temp 98.1  F (36.7  C) (Tympanic)   Resp 18   Ht 1.87 m (6' 1.62\")   Wt 88 kg (194 lb 1.6 oz)   SpO2 97%   BMI 25.18 kg/m     Estimated body mass index is 25.18 kg/m  as calculated from the following:    Height as of this encounter: 1.87 m (6' 1.62\").    Weight as of this encounter: 88 kg (194 lb 1.6 oz).    Physical " Exam  GENERAL: alert and no distress  EYES: Eyes grossly normal to inspection, PERRL and conjunctivae and sclerae normal  HENT: ear canals and TM's normal, nose and mouth without ulcers or lesions  NECK: no adenopathy, no asymmetry, masses, or scars  RESP: lungs clear to auscultation - no rales, rhonchi or wheezes  CV: regular rate and rhythm, normal S1 S2, no S3 or S4, no murmur, click or rub, no peripheral edema  ABDOMEN: soft, nontender, no hepatosplenomegaly, no masses and bowel sounds normal  MS: no gross musculoskeletal defects noted, no edema  SKIN: no suspicious lesions or rashes  NEURO: Normal strength and tone, mentation intact and speech normal  PSYCH: mentation appears normal, affect normal/bright    Signed Electronically by: NANDINI Montes CNP

## 2025-02-24 NOTE — PATIENT INSTRUCTIONS
Patient Education   Preventive Care Advice   This is general advice given by our system to help you stay healthy. However, your care team may have specific advice just for you. Please talk to your care team about your preventive care needs.  Nutrition  Eat 5 or more servings of fruits and vegetables each day.  Try wheat bread, brown rice and whole grain pasta (instead of white bread, rice, and pasta).  Get enough calcium and vitamin D. Check the label on foods and aim for 100% of the RDA (recommended daily allowance).  Lifestyle  Exercise at least 150 minutes each week  (30 minutes a day, 5 days a week).  Do muscle strengthening activities 2 days a week. These help control your weight and prevent disease.  No smoking.  Wear sunscreen to prevent skin cancer.  Have a dental exam and cleaning every 6 months.  Yearly exams  See your health care team every year to talk about:  Any changes in your health.  Any medicines your care team has prescribed.  Preventive care, family planning, and ways to prevent chronic diseases.  Shots (vaccines)   HPV shots (up to age 26), if you've never had them before.  Hepatitis B shots (up to age 59), if you've never had them before.  COVID-19 shot: Get this shot when it's due.  Flu shot: Get a flu shot every year.  Tetanus shot: Get a tetanus shot every 10 years.  Pneumococcal, hepatitis A, and RSV shots: Ask your care team if you need these based on your risk.  Shingles shot (for age 50 and up)  General health tests  Diabetes screening:  Starting at age 35, Get screened for diabetes at least every 3 years.  If you are younger than age 35, ask your care team if you should be screened for diabetes.  Cholesterol test: At age 39, start having a cholesterol test every 5 years, or more often if advised.  Bone density scan (DEXA): At age 50, ask your care team if you should have this scan for osteoporosis (brittle bones).  Hepatitis C: Get tested at least once in your life.  STIs (sexually  transmitted infections)  Before age 24: Ask your care team if you should be screened for STIs.  After age 24: Get screened for STIs if you're at risk. You are at risk for STIs (including HIV) if:  You are sexually active with more than one person.  You don't use condoms every time.  You or a partner was diagnosed with a sexually transmitted infection.  If you are at risk for HIV, ask about PrEP medicine to prevent HIV.  Get tested for HIV at least once in your life, whether you are at risk for HIV or not.  Cancer screening tests  Cervical cancer screening: If you have a cervix, begin getting regular cervical cancer screening tests starting at age 21.  Breast cancer scan (mammogram): If you've ever had breasts, begin having regular mammograms starting at age 40. This is a scan to check for breast cancer.  Colon cancer screening: It is important to start screening for colon cancer at age 45.  Have a colonoscopy test every 10 years (or more often if you're at risk) Or, ask your provider about stool tests like a FIT test every year or Cologuard test every 3 years.  To learn more about your testing options, visit:   .  For help making a decision, visit:   https://bit.ly/hz66285.  Prostate cancer screening test: If you have a prostate, ask your care team if a prostate cancer screening test (PSA) at age 55 is right for you.  Lung cancer screening: If you are a current or former smoker ages 50 to 80, ask your care team if ongoing lung cancer screenings are right for you.  For informational purposes only. Not to replace the advice of your health care provider. Copyright   2023 [employer]. All rights reserved. Clinically reviewed by the [facility]. [employer] 370939 - REV 01/24.

## 2025-04-03 ENCOUNTER — MYC MEDICAL ADVICE (OUTPATIENT)
Dept: FAMILY MEDICINE | Facility: CLINIC | Age: 65
End: 2025-04-03
Payer: COMMERCIAL

## 2025-04-03 DIAGNOSIS — F41.8 SITUATIONAL ANXIETY: Primary | ICD-10-CM

## 2025-04-03 RX ORDER — HYDROXYZINE HYDROCHLORIDE 10 MG/1
10 TABLET, FILM COATED ORAL 3 TIMES DAILY PRN
Qty: 30 TABLET | Refills: 0 | Status: SHIPPED | OUTPATIENT
Start: 2025-04-03

## 2025-04-03 NOTE — TELEPHONE ENCOUNTER
Shahana,    Please see MyChart message below and advise. Last OV 2/24 to establish care.    Thank you  Braulio SUAREZ RN  [facility] [[email or web address]]

## 2025-04-03 NOTE — TELEPHONE ENCOUNTER
Ordered 30 tablets of Hydroxyzine 10 mg as needed for anxiety up to three times daily. Please discontinue if you feel restless or have adverse reaction to medication.     Thank you for choosing us to be part of your health care team.     With Kind Regards,    NANDINI Montes CNP

## 2025-04-17 ENCOUNTER — E-VISIT (OUTPATIENT)
Dept: FAMILY MEDICINE | Facility: CLINIC | Age: 65
End: 2025-04-17
Payer: COMMERCIAL

## 2025-04-17 DIAGNOSIS — F41.9 ANXIETY: Primary | ICD-10-CM

## 2025-04-17 ASSESSMENT — ANXIETY QUESTIONNAIRES
1. FEELING NERVOUS, ANXIOUS, OR ON EDGE: SEVERAL DAYS
GAD7 TOTAL SCORE: 6
GAD7 TOTAL SCORE: 6
IF YOU CHECKED OFF ANY PROBLEMS ON THIS QUESTIONNAIRE, HOW DIFFICULT HAVE THESE PROBLEMS MADE IT FOR YOU TO DO YOUR WORK, TAKE CARE OF THINGS AT HOME, OR GET ALONG WITH OTHER PEOPLE: SOMEWHAT DIFFICULT
7. FEELING AFRAID AS IF SOMETHING AWFUL MIGHT HAPPEN: SEVERAL DAYS
2. NOT BEING ABLE TO STOP OR CONTROL WORRYING: SEVERAL DAYS
3. WORRYING TOO MUCH ABOUT DIFFERENT THINGS: SEVERAL DAYS
4. TROUBLE RELAXING: SEVERAL DAYS
5. BEING SO RESTLESS THAT IT IS HARD TO SIT STILL: SEVERAL DAYS
8. IF YOU CHECKED OFF ANY PROBLEMS, HOW DIFFICULT HAVE THESE MADE IT FOR YOU TO DO YOUR WORK, TAKE CARE OF THINGS AT HOME, OR GET ALONG WITH OTHER PEOPLE?: SOMEWHAT DIFFICULT
7. FEELING AFRAID AS IF SOMETHING AWFUL MIGHT HAPPEN: SEVERAL DAYS
6. BECOMING EASILY ANNOYED OR IRRITABLE: NOT AT ALL

## 2025-04-17 NOTE — PATIENT INSTRUCTIONS
Thank you for choosing us for your care. I think an in-clinic or virtual visit would be the best next step based on your symptoms so a discussion can be had about medication options and therapy options. Please schedule a clinic appointment; you won t be charged for this eVisit.      You can schedule an appointment by clicking here in [employer], or call [phone].

## 2025-04-23 ENCOUNTER — OFFICE VISIT (OUTPATIENT)
Dept: FAMILY MEDICINE | Facility: CLINIC | Age: 65
End: 2025-04-23
Payer: COMMERCIAL

## 2025-04-23 VITALS
HEART RATE: 97 BPM | TEMPERATURE: 98.8 F | OXYGEN SATURATION: 96 % | RESPIRATION RATE: 14 BRPM | WEIGHT: 197 LBS | DIASTOLIC BLOOD PRESSURE: 88 MMHG | BODY MASS INDEX: 26.11 KG/M2 | HEIGHT: 73 IN | SYSTOLIC BLOOD PRESSURE: 126 MMHG

## 2025-04-23 DIAGNOSIS — F40.243 FEAR OF FLYING: ICD-10-CM

## 2025-04-23 DIAGNOSIS — F41.1 GAD (GENERALIZED ANXIETY DISORDER): ICD-10-CM

## 2025-04-23 DIAGNOSIS — F33.1 MODERATE EPISODE OF RECURRENT MAJOR DEPRESSIVE DISORDER (H): Primary | ICD-10-CM

## 2025-04-23 PROCEDURE — 1126F AMNT PAIN NOTED NONE PRSNT: CPT | Performed by: STUDENT IN AN ORGANIZED HEALTH CARE EDUCATION/TRAINING PROGRAM

## 2025-04-23 PROCEDURE — G2211 COMPLEX E/M VISIT ADD ON: HCPCS | Performed by: STUDENT IN AN ORGANIZED HEALTH CARE EDUCATION/TRAINING PROGRAM

## 2025-04-23 PROCEDURE — 99214 OFFICE O/P EST MOD 30 MIN: CPT | Performed by: STUDENT IN AN ORGANIZED HEALTH CARE EDUCATION/TRAINING PROGRAM

## 2025-04-23 PROCEDURE — 3074F SYST BP LT 130 MM HG: CPT | Performed by: STUDENT IN AN ORGANIZED HEALTH CARE EDUCATION/TRAINING PROGRAM

## 2025-04-23 PROCEDURE — 3079F DIAST BP 80-89 MM HG: CPT | Performed by: STUDENT IN AN ORGANIZED HEALTH CARE EDUCATION/TRAINING PROGRAM

## 2025-04-23 PROCEDURE — 96127 BRIEF EMOTIONAL/BEHAV ASSMT: CPT | Performed by: STUDENT IN AN ORGANIZED HEALTH CARE EDUCATION/TRAINING PROGRAM

## 2025-04-23 RX ORDER — ESCITALOPRAM OXALATE 10 MG/1
10 TABLET ORAL DAILY
Qty: 90 TABLET | Refills: 3 | Status: SHIPPED | OUTPATIENT
Start: 2025-04-23

## 2025-04-23 RX ORDER — LORAZEPAM 0.5 MG/1
TABLET ORAL
Qty: 4 TABLET | Refills: 0 | Status: SHIPPED | OUTPATIENT
Start: 2025-04-23

## 2025-04-23 ASSESSMENT — PATIENT HEALTH QUESTIONNAIRE - PHQ9: 5. POOR APPETITE OR OVEREATING: SEVERAL DAYS

## 2025-04-23 ASSESSMENT — ANXIETY QUESTIONNAIRES
7. FEELING AFRAID AS IF SOMETHING AWFUL MIGHT HAPPEN: SEVERAL DAYS
IF YOU CHECKED OFF ANY PROBLEMS ON THIS QUESTIONNAIRE, HOW DIFFICULT HAVE THESE PROBLEMS MADE IT FOR YOU TO DO YOUR WORK, TAKE CARE OF THINGS AT HOME, OR GET ALONG WITH OTHER PEOPLE: NOT DIFFICULT AT ALL
6. BECOMING EASILY ANNOYED OR IRRITABLE: NOT AT ALL
GAD7 TOTAL SCORE: 10
3. WORRYING TOO MUCH ABOUT DIFFERENT THINGS: NEARLY EVERY DAY
1. FEELING NERVOUS, ANXIOUS, OR ON EDGE: SEVERAL DAYS
2. NOT BEING ABLE TO STOP OR CONTROL WORRYING: SEVERAL DAYS
5. BEING SO RESTLESS THAT IT IS HARD TO SIT STILL: NEARLY EVERY DAY
GAD7 TOTAL SCORE: 10

## 2025-04-23 ASSESSMENT — PAIN SCALES - GENERAL: PAINLEVEL_OUTOF10: NO PAIN (0)

## 2025-04-23 NOTE — PROGRESS NOTES
Assessment & Plan     Moderate episode of recurrent major depressive disorder (H)  GABE (generalized anxiety disorder)  > Patient admits hydroxyzine did not adequately address his symptoms, his wife is on Lexapro and he is interested in trialing SSRIs  - Prescription sent for escitalopram (LEXAPRO) 10 MG tablet; Take 1 tablet (10 mg) by mouth daily.  - patient made aware of possible side effects from Lexapro, he is also aware that this medication may not work right away and can sometimes take 4 to 8 weeks for full effects to be noted     Fear of flying  > Patient is traveling to [place] next month, he has a phobia of flying  - LORazepam (ATIVAN) 0.5 MG tablet; Take 1 tablet 15 min prior to your flight    Follow-up plan:   - return to clinic in 4-6 weeks to follow-up with PCP on response to Lexapro, if needed can consider increasing the dose further and discussing starting therapy/[facility] services with patient     The longitudinal plan of care for the diagnosis(es)/condition(s) as documented were addressed during this visit. Due to the added complexity in care, I will continue to support Shaggy in the subsequent management and with ongoing continuity of care.      Subjective   Shaggy is a 64 year old, presenting for the following health issues:  Er F/u        4/23/2025     9:45 AM   Additional Questions   Roomed by maddie Walker   Accompanied by self         4/23/2025     9:45 AM   Patient Reported Additional Medications   Patient reports taking the following new medications Nicotine Mints- 2mg- 5 per day.     HPI        ED/UC Followup:    Facility:  [facility]  Date of visit: 4/16/2025  Reason for visit: Chest Pressure and Headache  Current Status: States that he is doing well. Headache went away and the Chest pressure he states he thinks was from Anxiety from the occurrence.     Anxiety   How are you doing with your anxiety since your last visit? Is going ok but states that its harder to manage by  himself.  Are you having other symptoms that might be associated with anxiety? Yes:  Panic attacks and chest pains- Back on the .   Have you had a significant life event? OTHER: Anxiety about upcoming flight  Visiting his son in [place], [place] hasn't flown for a couple of years and is an anxious flier   Are you feeling depressed? No  Do you have any concerns with your use of alcohol or other drugs? 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 1.5 years since last clean lung scan     Does use Nicotine Mints   Vaping Use    Vaping status: Never Used   Substance Use Topics    Alcohol use: Not Currently     Comment: Quit entirely on 2019    Drug use: No         6/10/2024     9:31 PM 2025    12:19 PM 2025     9:56 AM   GABE-7 SCORE   Total Score 0 (minimal anxiety) 6 (mild anxiety)    Total Score 0 6  10       Patient-reported         3/20/2023     8:46 AM 6/10/2024     9:30 PM 2025    12:09 PM   PHQ   PHQ-9 Total Score 0 0 1    Q9: Thoughts of better off dead/self-harm past 2 weeks Not at all  Not at all Not at all       Patient-reported    Proxy-reported         2025     9:56 AM   GABE-7    1. Feeling nervous, anxious, or on edge 1   2. Not being able to stop or control worrying 1   3. Worrying too much about different things 3   4. Trouble relaxing 1   5. Being so restless that it is hard to sit still 3   6. Becoming easily annoyed or irritable 0   7.  "Feeling afraid, as if something awful might happen 1   GABE-7 Total Score 10   If you checked any problems, how difficult have they made it for you to do your work, take care of things at home, or get along with other people? Not difficult at all     ROS:   As above         Objective    /88 (BP Location: Right arm, Patient Position: Sitting, Cuff Size: Adult Regular)   Pulse 97   Temp 98.8  F (37.1  C) (Tympanic)   Resp 14   Ht 1.861 m (6' 1.25\")   Wt 89.4 kg (197 lb)   SpO2 96%   BMI 25.81 kg/m    Body mass index is 25.81 kg/m .  Physical Exam  Constitutional:       General: Shaggy is not in acute distress.  HENT:      Head: Normocephalic and atraumatic.      Right Ear: External ear normal.      Left Ear: External ear normal.      Mouth/Throat:      Mouth: Mucous membranes are moist.      Pharynx: Oropharynx is clear. No oropharyngeal exudate or posterior oropharyngeal erythema.   Eyes:      Extraocular Movements: Extraocular movements intact.   Cardiovascular:      Rate and Rhythm: Normal rate and regular rhythm.      Heart sounds: Normal heart sounds.   Pulmonary:      Effort: Pulmonary effort is normal. No respiratory distress.      Breath sounds: Normal breath sounds. No wheezing or rhonchi.   Abdominal:      Palpations: Abdomen is soft. There is no mass.      Tenderness: There is no abdominal tenderness.   Musculoskeletal:         General: No deformity. Normal range of motion.      Cervical back: Normal range of motion and neck supple.   Skin:     General: Skin is warm.      Findings: No rash.   Neurological:      General: No focal deficit present.      Mental Status: Shaggy is alert and oriented to person, place, and time.   Psychiatric:         Mood and Affect: Mood normal.                  Signed Electronically by: NELSON COOK MD    "

## 2025-06-16 ENCOUNTER — OFFICE VISIT (OUTPATIENT)
Dept: FAMILY MEDICINE | Facility: CLINIC | Age: 65
End: 2025-06-16
Attending: FAMILY MEDICINE
Payer: COMMERCIAL

## 2025-06-16 ENCOUNTER — RESULTS FOLLOW-UP (OUTPATIENT)
Dept: FAMILY MEDICINE | Facility: CLINIC | Age: 65
End: 2025-06-16

## 2025-06-16 VITALS
WEIGHT: 198 LBS | HEIGHT: 74 IN | HEART RATE: 94 BPM | SYSTOLIC BLOOD PRESSURE: 124 MMHG | TEMPERATURE: 98 F | RESPIRATION RATE: 16 BRPM | DIASTOLIC BLOOD PRESSURE: 76 MMHG | OXYGEN SATURATION: 97 % | BODY MASS INDEX: 25.41 KG/M2

## 2025-06-16 DIAGNOSIS — Z87.891 HISTORY OF TOBACCO USE, PRESENTING HAZARDS TO HEALTH: ICD-10-CM

## 2025-06-16 DIAGNOSIS — I10 HYPERTENSION GOAL BP (BLOOD PRESSURE) < 140/90: ICD-10-CM

## 2025-06-16 DIAGNOSIS — F40.243 FEAR OF FLYING: ICD-10-CM

## 2025-06-16 DIAGNOSIS — Z00.00 ROUTINE GENERAL MEDICAL EXAMINATION AT A HEALTH CARE FACILITY: Primary | ICD-10-CM

## 2025-06-16 DIAGNOSIS — F41.1 GAD (GENERALIZED ANXIETY DISORDER): ICD-10-CM

## 2025-06-16 LAB
ANION GAP SERPL CALCULATED.3IONS-SCNC: 9 MMOL/L (ref 7–15)
BUN SERPL-MCNC: 14.4 MG/DL (ref 8–23)
CALCIUM SERPL-MCNC: 9.7 MG/DL (ref 8.8–10.4)
CHLORIDE SERPL-SCNC: 102 MMOL/L (ref 98–107)
CREAT SERPL-MCNC: 0.73 MG/DL (ref 0.51–1.17)
EGFRCR SERPLBLD CKD-EPI 2021: >90 ML/MIN/1.73M2
GLUCOSE SERPL-MCNC: 97 MG/DL (ref 70–99)
HCO3 SERPL-SCNC: 27 MMOL/L (ref 22–29)
POTASSIUM SERPL-SCNC: 4.4 MMOL/L (ref 3.4–5.3)
SODIUM SERPL-SCNC: 138 MMOL/L (ref 135–145)

## 2025-06-16 PROCEDURE — 80048 BASIC METABOLIC PNL TOTAL CA: CPT

## 2025-06-16 PROCEDURE — 36415 COLL VENOUS BLD VENIPUNCTURE: CPT

## 2025-06-16 PROCEDURE — 3078F DIAST BP <80 MM HG: CPT

## 2025-06-16 PROCEDURE — 1126F AMNT PAIN NOTED NONE PRSNT: CPT

## 2025-06-16 PROCEDURE — 3074F SYST BP LT 130 MM HG: CPT

## 2025-06-16 PROCEDURE — 99214 OFFICE O/P EST MOD 30 MIN: CPT | Mod: 25

## 2025-06-16 PROCEDURE — G0296 VISIT TO DETERM LDCT ELIG: HCPCS

## 2025-06-16 PROCEDURE — 96127 BRIEF EMOTIONAL/BEHAV ASSMT: CPT

## 2025-06-16 PROCEDURE — 99396 PREV VISIT EST AGE 40-64: CPT

## 2025-06-16 RX ORDER — LISINOPRIL 5 MG/1
5 TABLET ORAL DAILY
Qty: 90 TABLET | Refills: 3 | Status: SHIPPED | OUTPATIENT
Start: 2025-06-16

## 2025-06-16 RX ORDER — LORAZEPAM 0.5 MG/1
TABLET ORAL
Qty: 4 TABLET | Refills: 0 | Status: SHIPPED | OUTPATIENT
Start: 2025-06-16

## 2025-06-16 SDOH — HEALTH STABILITY: PHYSICAL HEALTH: ON AVERAGE, HOW MANY DAYS PER WEEK DO YOU ENGAGE IN MODERATE TO STRENUOUS EXERCISE (LIKE A BRISK WALK)?: 5 DAYS

## 2025-06-16 SDOH — HEALTH STABILITY: PHYSICAL HEALTH: ON AVERAGE, HOW MANY MINUTES DO YOU ENGAGE IN EXERCISE AT THIS LEVEL?: 20 MIN

## 2025-06-16 ASSESSMENT — ANXIETY QUESTIONNAIRES
1. FEELING NERVOUS, ANXIOUS, OR ON EDGE: NOT AT ALL
8. IF YOU CHECKED OFF ANY PROBLEMS, HOW DIFFICULT HAVE THESE MADE IT FOR YOU TO DO YOUR WORK, TAKE CARE OF THINGS AT HOME, OR GET ALONG WITH OTHER PEOPLE?: NOT DIFFICULT AT ALL
2. NOT BEING ABLE TO STOP OR CONTROL WORRYING: NOT AT ALL
GAD7 TOTAL SCORE: 0
4. TROUBLE RELAXING: NOT AT ALL
5. BEING SO RESTLESS THAT IT IS HARD TO SIT STILL: NOT AT ALL
GAD7 TOTAL SCORE: 0
7. FEELING AFRAID AS IF SOMETHING AWFUL MIGHT HAPPEN: NOT AT ALL
7. FEELING AFRAID AS IF SOMETHING AWFUL MIGHT HAPPEN: NOT AT ALL
3. WORRYING TOO MUCH ABOUT DIFFERENT THINGS: NOT AT ALL
6. BECOMING EASILY ANNOYED OR IRRITABLE: NOT AT ALL
GAD7 TOTAL SCORE: 0
IF YOU CHECKED OFF ANY PROBLEMS ON THIS QUESTIONNAIRE, HOW DIFFICULT HAVE THESE PROBLEMS MADE IT FOR YOU TO DO YOUR WORK, TAKE CARE OF THINGS AT HOME, OR GET ALONG WITH OTHER PEOPLE: NOT DIFFICULT AT ALL

## 2025-06-16 ASSESSMENT — PAIN SCALES - GENERAL: PAINLEVEL_OUTOF10: NO PAIN (0)

## 2025-06-16 ASSESSMENT — SOCIAL DETERMINANTS OF HEALTH (SDOH): HOW OFTEN DO YOU GET TOGETHER WITH FRIENDS OR RELATIVES?: ONCE A WEEK

## 2025-06-16 NOTE — PROGRESS NOTES
Preventive Care Visit  [facility]  NANDINI Montes CNP, Nurse Practitioner Primary Care  Jun 16, 2025      Assessment & Plan     Routine general medical examination at a health care facility  Patient is in overall general good health. Patient had no concerns during today's visit.      - PRIMARY CARE FOLLOW-UP SCHEDULING    GABE (generalized anxiety disorder)  Patient recently started Lexapro 10 mg daily and feels medication and current dosage are managing anxiety symptoms well. Continuing medication.     Hypertension goal BP (blood pressure) < 140/90  Blood pressure well managed on 5 mg Lisinopril.     REFILLED    - BASIC METABOLIC PANEL; Future  - lisinopril (ZESTRIL) 5 MG tablet; Take 1 tablet (5 mg) by mouth daily.  - BASIC METABOLIC PANEL    History of tobacco use, presenting hazards to health  Patient quit smoking 4 years ago and Lung CT history advised to follow up with Lung CT due to nodules found.     - CT Chest Lung Cancer Screen Low Dose Without; Future    Fear of flying  Patient has a fear of flying. Provided 4 tablets for travel purposes.     REFILLED 4 tablets for travel .     - LORazepam (ATIVAN) 0.5 MG tablet; Take 1 tablet 15 min prior to your flight    Patient has been advised of split billing requirements and indicates understanding: Yes    Counseling  Appropriate preventive services were addressed with this patient via screening, questionnaire, or discussion as appropriate for fall prevention, nutrition, physical activity, Tobacco-use cessation, social engagement, weight loss and cognition.  Checklist reviewing preventive services available has been given to the patient.  Reviewed patient's diet, addressing concerns and/or questions.       Fabi Coon is a 64 year old, presenting for the following:  Physical, Recheck Medication, Anxiety, Hypertension, and Health Maintenance (Declines vaccinations today)        6/16/2025    10:07 AM   Additional Questions  "  Roomed by Sherry GUTIERREZ LPN   Accompanied by self         2025    10:07 AM   Patient Reported Additional Medications   Patient reports taking the following new medications no new meds          HPI  64 year old male presents for annual preventative visit. Vital signs are stable. Patient was started on Lexapro 10 mg daily and reports anxiety symptoms are well managed on medication. Patient denies any thoughts of hurting self or others and identifies with reasons for living. Patient denies headache, pain, GI concerns, and nausea.      Hypertension Follow-up    Do you check your blood pressure regularly outside of the clinic? No   Are you following a low salt diet? No  Are your blood pressures ever more than 140 on the top number (systolic) OR more   than 90 on the bottom number (diastolic), for example 140/90? No    BP Readings from Last 2 Encounters:   25 124/76   25 126/88     Medication Followup of Lisinopril 5 mg daily  Taking Medication as prescribed: yes  Side Effects:  None  Medication Helping Symptoms:  yes     Anxiety   How are you doing with your anxiety since your last visit? Improved \"MUCH BETTER!\" Patient states   Are you having other symptoms that might be associated with anxiety? No  Have you had a significant life event? No   Are you feeling depressed? No  Do you have any concerns with your use of alcohol or other drugs? No    Social History     Tobacco Use    Smoking status: Former     Current packs/day: 0.00     Average packs/day: 1 pack/day for 2.0 years (2.0 ttl pk-yrs)     Types: Cigarettes, Other     Start date: 3/31/2019     Quit date: 3/11/2021     Years since quittin.2     Passive exposure: Past    Smokeless tobacco: Never    Tobacco comments:     Off and on for many years. 1.5 years since last clean lung scan   Vaping Use    Vaping status: Never Used   Substance Use Topics    Alcohol use: Not Currently     Comment: Quit entirely on 2019    Drug use: No         " 4/17/2025    12:19 PM 4/23/2025     9:56 AM 6/16/2025     8:39 AM   GABE-7 SCORE   Total Score 6 (mild anxiety)  0 (minimal anxiety)   Total Score 6  10 0        Patient-reported         6/10/2024     9:30 PM 4/17/2025    12:09 PM 6/16/2025     8:38 AM   PHQ   PHQ-9 Total Score 0 1  0    Q9: Thoughts of better off dead/self-harm past 2 weeks Not at all Not at all Not at all       Patient-reported         6/16/2025     8:38 AM   Last PHQ-9   1.  Little interest or pleasure in doing things 0   2.  Feeling down, depressed, or hopeless 0   3.  Trouble falling or staying asleep, or sleeping too much 0   4.  Feeling tired or having little energy 0   5.  Poor appetite or overeating 0   6.  Feeling bad about yourself 0   7.  Trouble concentrating 0   8.  Moving slowly or restless 0   Q9: Thoughts of better off dead/self-harm past 2 weeks 0   PHQ-9 Total Score 0        Patient-reported         6/16/2025     8:39 AM   GABE-7    1. Feeling nervous, anxious, or on edge 0   2. Not being able to stop or control worrying 0   3. Worrying too much about different things 0   4. Trouble relaxing 0   5. Being so restless that it is hard to sit still 0   6. Becoming easily annoyed or irritable 0   7. Feeling afraid, as if something awful might happen 0   GABE-7 Total Score 0    If you checked any problems, how difficult have they made it for you to do your work, take care of things at home, or get along with other people? Not difficult at all       Patient-reported     Annual Wellness Visit     Patient has been advised of split billing requirements and indicates understanding: Yes      Health Care Directive  Patient does not have a Health Care Directive: Discussed advance care planning with patient; information given to patient to review.      6/16/2025   General Health   How would you rate your overall physical health? Good   Feel stress (tense, anxious, or unable to sleep) Not at all          6/16/2025   Nutrition   Diet: Regular (no  restrictions)         2025   Exercise   Days per week of moderate/strenous exercise 5 days   Average minutes spent exercising at this level 20 min           2025   Social Factors   Frequency of gathering with friends or relatives Once a week   Worry food won't last until get money to buy more No   Food not last or not have enough money for food? No   Do you have housing? (Housing is defined as stable permanent housing and does not include staying outside in a car, in a tent, in an abandoned building, in an overnight shelter, or couch-surfing.) Yes   Are you worried about losing your housing? No   Lack of transportation? No   Unable to get utilities (heat,electricity)? No           2025   Fall Risk   Fallen 2 or more times in the past year? No   Trouble with walking or balance? No           No data to display                  2025   Dental   Dentist two times every year? Yes          No data to display                     No data to display                  6/10/2024   TB Screening   Were you born outside of the [place]? No         2025   Substance Use   Alcohol more than 3/day or more than 7/wk Not Applicable   Do you use any other substances recreationally? No     Social History     Tobacco Use    Smoking status: Former     Current packs/day: 0.00     Average packs/day: 1 pack/day for 2.0 years (2.0 ttl pk-yrs)     Types: Cigarettes, Other     Start date: 3/31/2019     Quit date: 3/11/2021     Years since quittin.2     Passive exposure: Past    Smokeless tobacco: Never    Tobacco comments:     Off and on for many years. 1.5 years since last clean lung scan   Vaping Use    Vaping status: Never Used   Substance Use Topics    Alcohol use: Not Currently     Comment: Quit entirely on 2019    Drug use: No       Today's PHQ-9 Score:       2025     8:38 AM   PHQ-9 SCORE   PHQ-9 Total Score MyChart 0   PHQ-9 Total Score 0        Patient-reported           2025   One time HIV  Screening   Previous HIV test? Yes         2025   STI Screening   New sexual partner(s) since last STI/HIV test? No   Last PSA:   PSA   Date Value Ref Range Status   2021 0.50 0 - 4 ug/L Final     Comment:     Assay Method:  Chemiluminescence using Siemens Vista analyzer     ASCVD Risk   The 10-year ASCVD risk score (Kush MONTEMAYOR, et al., 2019) is: 12.6%    Values used to calculate the score:      Age: 64 years      Sex: Male      Is Non- : No      Diabetic: No      Tobacco smoker: No      Systolic Blood Pressure: 124 mmHg      Is BP treated: Yes      HDL Cholesterol: 56 mg/dL      Total Cholesterol: 233 mg/dL    Fracture Risk Assessment Tool  Link to Frax Calculator  Use the information below to complete the Frax calculator  : 1960  Sex: adult  Weight (kg): 89.8 kg (actual weight)  Height (cm): 187 cm  Previous Fragility Fracture:  No  History of parent with fractured hip:  No  Current Smoking:  No  Patient has been on glucocorticoids for more than 3 months (5mg/day or more): No  Rheumatoid Arthritis on Problem List:  No  Secondary Osteoporosis on Problem List:  No  Consumes 3 or more units of alcohol per day: No  Femoral Neck BMD (g/cm2)             Reviewed and updated as needed this visit by Provider     Meds  Problems    Fam Hx            Lab work is in process    Current providers sharing in care for this patient include:  Patient Care Team:  Shahana Gamboa APRN CNP as PCP - General (Nurse Practitioner Primary Care)  Opal Cornejo, RN as Specialty Care Coordinator (Cardiology)  Eli Morel MD (Cardiovascular Disease)  Francine Pastor MD as Assigned PCP    The following health maintenance items are reviewed in Epic and correct as of today:  Health Maintenance   Topic Date Due    HIV SCREENING  Never done    PNEUMOCOCCAL VACCINE 50+ YEARS (1 of 1 - PCV) Never done    ZOSTER VACCINE (1 of 2) Never done    RSV VACCINE (1 - Risk 60-74 years 1-dose  series) Never done    COVID-19 VACCINE (1 - 2024-25 season) Never done    YEARLY PREVENTIVE VISIT  06/11/2025    LUNG CANCER SCREENING  07/17/2025    INFLUENZA VACCINE (Season Ended) 09/01/2025    PHQ-9  12/16/2025    LIPID  02/24/2026    ANNUAL REVIEW OF HM ORDERS  02/24/2026    BMP  06/16/2026    COLORECTAL CANCER SCREENING  02/19/2028    DIABETES SCREENING  06/16/2028    ADVANCE CARE PLANNING  02/24/2030    DTAP/TDAP/TD VACCINE (3 - Td or Tdap) 06/11/2034    HEPATITIS C SCREENING  Completed    DEPRESSION ACTION PLAN  Completed    HPV VACCINE  Aged Out    MENINGITIS VACCINE  Aged Out       Appropriate preventive services were discussed with this patient, including applicable screening as appropriate for fall prevention, nutrition, physical activity, Tobacco-use cessation, weight loss and cognition.  Checklist reviewing preventive services available has been given to the patient.        Advance Care Planning    Discussed advance care planning with patient; however, patient declined at this time.        6/16/2025   General Health   How would you rate your overall physical health? Good   Feel stress (tense, anxious, or unable to sleep) Not at all         6/16/2025   Nutrition   Three or more servings of calcium each day? Yes   Diet: Regular (no restrictions)   How many servings of fruit and vegetables per day? (!) 2-3   How many sweetened beverages each day? 0-1         6/16/2025   Exercise   Days per week of moderate/strenous exercise 5 days   Average minutes spent exercising at this level 20 min         6/16/2025   Social Factors   Frequency of gathering with friends or relatives Once a week   Worry food won't last until get money to buy more No   Food not last or not have enough money for food? No   Do you have housing? (Housing is defined as stable permanent housing and does not include staying outside in a car, in a tent, in an abandoned building, in an overnight shelter, or couch-surfing.) Yes   Are you worried  about losing your housing? No   Lack of transportation? No   Unable to get utilities (heat,electricity)? No         2025   Fall Risk   Fallen 2 or more times in the past year? No   Trouble with walking or balance? No          2025   Dental   Dentist two times every year? Yes       Today's PHQ-9 Score:       2025     8:38 AM   PHQ-9 SCORE   PHQ-9 Total Score MyChart 0   PHQ-9 Total Score 0        Patient-reported         2025   Substance Use   Alcohol more than 3/day or more than 7/wk Not Applicable   Do you use any other substances recreationally? No     Social History     Tobacco Use    Smoking status: Former     Current packs/day: 0.00     Average packs/day: 1 pack/day for 2.0 years (2.0 ttl pk-yrs)     Types: Cigarettes, Other     Start date: 3/31/2019     Quit date: 3/11/2021     Years since quittin.2     Passive exposure: Past    Smokeless tobacco: Never    Tobacco comments:     Off and on for many years. 1.5 years since last clean lung scan   Vaping Use    Vaping status: Never Used   Substance Use Topics    Alcohol use: Not Currently     Comment: Quit entirely on 2019    Drug use: No             2025   One time HIV Screening   Previous HIV test? Yes         2025   STI Screening   New sexual partner(s) since last STI/HIV test? No   Last PSA:   PSA   Date Value Ref Range Status   2021 0.50 0 - 4 ug/L Final     Comment:     Assay Method:  Chemiluminescence using Siemens Vista analyzer     ASCVD Risk   The 10-year ASCVD risk score (Kush MONTEMAYOR, et al., 2019) is: 12.6%    Values used to calculate the score:      Age: 64 years      Sex: Male      Is Non- : No      Diabetic: No      Tobacco smoker: No      Systolic Blood Pressure: 124 mmHg      Is BP treated: Yes      HDL Cholesterol: 56 mg/dL      Total Cholesterol: 233 mg/dL       Reviewed and updated as needed this visit by Provider                    Lab work is in process      Review  "of Systems  Constitutional, HEENT, cardiovascular, pulmonary, GI, , musculoskeletal, neuro, skin, endocrine and psych systems are negative, except as otherwise noted.     Objective    Exam  /76 (BP Location: Right arm, Patient Position: Sitting, Cuff Size: Adult Regular)   Pulse 94   Temp 98  F (36.7  C) (Tympanic)   Resp 16   Ht 1.87 m (6' 1.62\")   Wt 89.8 kg (198 lb)   SpO2 97%   BMI 25.68 kg/m     Estimated body mass index is 25.68 kg/m  as calculated from the following:    Height as of this encounter: 1.87 m (6' 1.62\").    Weight as of this encounter: 89.8 kg (198 lb).    Physical Exam  GENERAL: alert and no distress  EYES: Eyes grossly normal to inspection, PERRL and conjunctivae and sclerae normal  HENT: ear canals and TM's normal, nose and mouth without ulcers or lesions  NECK: no adenopathy, no asymmetry, masses, or scars  RESP: lungs clear to auscultation - no rales, rhonchi or wheezes  CV: regular rate and rhythm, normal S1 S2, no S3 or S4, no murmur, click or rub, no peripheral edema  ABDOMEN: soft, nontender, no hepatosplenomegaly, no masses and bowel sounds normal  MS: no gross musculoskeletal defects noted, no edema  SKIN: no suspicious lesions or rashes  NEURO: Normal strength and tone, mentation intact and speech normal  PSYCH: mentation appears normal, affect normal/bright        Signed Electronically by: NANDINI Montes CNP    "

## 2025-07-07 DIAGNOSIS — L30.9 ECZEMA, UNSPECIFIED TYPE: ICD-10-CM

## 2025-07-08 RX ORDER — MOMETASONE FUROATE 1 MG/G
CREAM TOPICAL
Qty: 45 G | Refills: 3 | Status: SHIPPED | OUTPATIENT
Start: 2025-07-08

## 2025-07-27 DIAGNOSIS — E78.5 HYPERLIPIDEMIA LDL GOAL <100: ICD-10-CM

## 2025-07-27 DIAGNOSIS — I10 HYPERTENSION GOAL BP (BLOOD PRESSURE) < 140/90: ICD-10-CM

## 2025-07-28 RX ORDER — ATORVASTATIN CALCIUM 20 MG/1
20 TABLET, FILM COATED ORAL DAILY
Qty: 90 TABLET | Refills: 3 | OUTPATIENT
Start: 2025-07-28

## 2025-07-28 RX ORDER — LISINOPRIL 5 MG/1
5 TABLET ORAL DAILY
Qty: 90 TABLET | Refills: 3 | OUTPATIENT
Start: 2025-07-28

## (undated) DEVICE — INTRO SHEATH MICRO PLATINUM TIP 4FRX40CM 7274

## (undated) DEVICE — CATH ANGIO SUPERTORQUE PLUS JL4 6FRX100CM 533620

## (undated) DEVICE — TUBE SET SMARKABLATE IRRIGATION

## (undated) DEVICE — CATH ANGIO SUPERTORQUE PLUS JR4 6FRX100CM 533621

## (undated) DEVICE — INTRODUCER SHEATH FAST-CATH CATH-LOCK 6FRX12CM 406701

## (undated) DEVICE — SOL WATER IRRIG 1000ML BOTTLE 07139-09

## (undated) DEVICE — PACK HEART LEFT CUSTOM

## (undated) DEVICE — INTRODUCER SHEATH FAST-CATH CATH-LOCK 7FRX12CM 406702

## (undated) DEVICE — INTRO CATH 12CM 8.5FR FST-CATH

## (undated) DEVICE — CATH EP REPRO DAIG SPRM FX CRV DX EP C

## (undated) DEVICE — PATCH CARTO 3 EXTERNAL REFERENCE 3D MAPPING CREFP6

## (undated) DEVICE — INTRO SHEATH 4FRX10CM PINNACLE RSS402

## (undated) DEVICE — CATHETER EPT POLARIS X 6FR STEERABLE M0047003D0

## (undated) DEVICE — PAD DEFIBRILLATOR ELECTRODE 4.25INX6IN ADULT 2001M-C

## (undated) DEVICE — CATH THERMOCOOL SMARTTOUCH DF CURVE

## (undated) DEVICE — KIT VENOUS FLUSH 60210177

## (undated) DEVICE — INTRODUCER SHEATH FAST-CATH 8FRX12CM 406112

## (undated) RX ORDER — CALCIUM CARBONATE 500 MG/1
TABLET, CHEWABLE ORAL
Status: DISPENSED
Start: 2022-02-03

## (undated) RX ORDER — PROTAMINE SULFATE 10 MG/ML
INJECTION, SOLUTION INTRAVENOUS
Status: DISPENSED
Start: 2022-02-03

## (undated) RX ORDER — HEPARIN SODIUM 1000 [USP'U]/ML
INJECTION, SOLUTION INTRAVENOUS; SUBCUTANEOUS
Status: DISPENSED
Start: 2022-02-03

## (undated) RX ORDER — FENTANYL CITRATE 50 UG/ML
INJECTION, SOLUTION INTRAMUSCULAR; INTRAVENOUS
Status: DISPENSED
Start: 2022-02-03

## (undated) RX ORDER — OXYCODONE AND ACETAMINOPHEN 5; 325 MG/1; MG/1
TABLET ORAL
Status: DISPENSED
Start: 2022-02-03

## (undated) RX ORDER — SODIUM CHLORIDE 9 MG/ML
INJECTION, SOLUTION INTRAVENOUS
Status: DISPENSED
Start: 2022-02-03

## (undated) RX ORDER — FENTANYL CITRATE 50 UG/ML
INJECTION, SOLUTION INTRAMUSCULAR; INTRAVENOUS
Status: DISPENSED
Start: 2018-02-19